# Patient Record
Sex: MALE | Race: OTHER | Employment: OTHER | URBAN - METROPOLITAN AREA
[De-identification: names, ages, dates, MRNs, and addresses within clinical notes are randomized per-mention and may not be internally consistent; named-entity substitution may affect disease eponyms.]

---

## 2017-02-06 RX ORDER — SITAGLIPTIN 100 MG/1
TABLET, FILM COATED ORAL
Qty: 90 TABLET | Refills: 1 | Status: SHIPPED | OUTPATIENT
Start: 2017-02-06 | End: 2017-03-02 | Stop reason: SDUPTHER

## 2017-03-02 ENCOUNTER — OFFICE VISIT (OUTPATIENT)
Dept: INTERNAL MEDICINE | Age: 77
End: 2017-03-02

## 2017-03-02 VITALS
BODY MASS INDEX: 23.3 KG/M2 | HEIGHT: 66 IN | SYSTOLIC BLOOD PRESSURE: 110 MMHG | WEIGHT: 145 LBS | HEART RATE: 109 BPM | DIASTOLIC BLOOD PRESSURE: 72 MMHG | TEMPERATURE: 96.5 F

## 2017-03-02 DIAGNOSIS — Z79.4 UNCONTROLLED TYPE 2 DIABETES MELLITUS WITH MILD NONPROLIFERATIVE RETINOPATHY, WITH LONG-TERM CURRENT USE OF INSULIN, MACULAR EDEMA PRESENCE UNSPECIFIED: Primary | Chronic | ICD-10-CM

## 2017-03-02 DIAGNOSIS — Z23 NEED FOR VACCINATION WITH 13-POLYVALENT PNEUMOCOCCAL CONJUGATE VACCINE: ICD-10-CM

## 2017-03-02 DIAGNOSIS — E11.3299 UNCONTROLLED TYPE 2 DIABETES MELLITUS WITH MILD NONPROLIFERATIVE RETINOPATHY, WITH LONG-TERM CURRENT USE OF INSULIN, MACULAR EDEMA PRESENCE UNSPECIFIED: Primary | Chronic | ICD-10-CM

## 2017-03-02 DIAGNOSIS — E11.65 UNCONTROLLED TYPE 2 DIABETES MELLITUS WITH MILD NONPROLIFERATIVE RETINOPATHY, WITH LONG-TERM CURRENT USE OF INSULIN, MACULAR EDEMA PRESENCE UNSPECIFIED: Primary | Chronic | ICD-10-CM

## 2017-03-02 LAB — HBA1C MFR BLD: 8.9 %

## 2017-03-02 PROCEDURE — 90670 PCV13 VACCINE IM: CPT | Performed by: INTERNAL MEDICINE

## 2017-03-02 PROCEDURE — 99213 OFFICE O/P EST LOW 20 MIN: CPT | Performed by: INTERNAL MEDICINE

## 2017-03-02 PROCEDURE — G0009 ADMIN PNEUMOCOCCAL VACCINE: HCPCS | Performed by: INTERNAL MEDICINE

## 2017-03-02 PROCEDURE — 83036 HEMOGLOBIN GLYCOSYLATED A1C: CPT | Performed by: INTERNAL MEDICINE

## 2017-03-02 ASSESSMENT — PATIENT HEALTH QUESTIONNAIRE - PHQ9
1. LITTLE INTEREST OR PLEASURE IN DOING THINGS: 0
2. FEELING DOWN, DEPRESSED OR HOPELESS: 0
SUM OF ALL RESPONSES TO PHQ QUESTIONS 1-9: 0
SUM OF ALL RESPONSES TO PHQ9 QUESTIONS 1 & 2: 0

## 2017-03-02 ASSESSMENT — ENCOUNTER SYMPTOMS
BLURRED VISION: 1
ABDOMINAL PAIN: 0
CHEST TIGHTNESS: 0
SHORTNESS OF BREATH: 0
TROUBLE SWALLOWING: 0
BLOOD IN STOOL: 0
EYE PAIN: 0

## 2017-05-05 RX ORDER — MEMANTINE HYDROCHLORIDE 10 MG/1
TABLET ORAL
Qty: 180 TABLET | Refills: 1 | Status: SHIPPED | OUTPATIENT
Start: 2017-05-05 | End: 2017-12-02 | Stop reason: SDUPTHER

## 2017-07-06 ENCOUNTER — OFFICE VISIT (OUTPATIENT)
Dept: INTERNAL MEDICINE | Age: 77
End: 2017-07-06

## 2017-07-06 VITALS
TEMPERATURE: 97.5 F | HEART RATE: 100 BPM | OXYGEN SATURATION: 97 % | DIASTOLIC BLOOD PRESSURE: 78 MMHG | HEIGHT: 66 IN | SYSTOLIC BLOOD PRESSURE: 130 MMHG | BODY MASS INDEX: 23.3 KG/M2 | WEIGHT: 145 LBS

## 2017-07-06 DIAGNOSIS — Z91.81 AT HIGH RISK FOR FALLS: ICD-10-CM

## 2017-07-06 DIAGNOSIS — E11.3299 UNCONTROLLED TYPE 2 DIABETES MELLITUS WITH MILD NONPROLIFERATIVE RETINOPATHY, WITH LONG-TERM CURRENT USE OF INSULIN, MACULAR EDEMA PRESENCE UNSPECIFIED: Primary | ICD-10-CM

## 2017-07-06 DIAGNOSIS — E11.42 DIABETIC POLYNEUROPATHY ASSOCIATED WITH TYPE 2 DIABETES MELLITUS (HCC): ICD-10-CM

## 2017-07-06 DIAGNOSIS — E11.65 UNCONTROLLED TYPE 2 DIABETES MELLITUS WITH MILD NONPROLIFERATIVE RETINOPATHY, WITH LONG-TERM CURRENT USE OF INSULIN, MACULAR EDEMA PRESENCE UNSPECIFIED: Primary | ICD-10-CM

## 2017-07-06 DIAGNOSIS — Z79.4 UNCONTROLLED TYPE 2 DIABETES MELLITUS WITH MILD NONPROLIFERATIVE RETINOPATHY, WITH LONG-TERM CURRENT USE OF INSULIN, MACULAR EDEMA PRESENCE UNSPECIFIED: Primary | ICD-10-CM

## 2017-07-06 PROBLEM — H91.90 HEARING LOSS: Status: ACTIVE | Noted: 2017-07-06

## 2017-07-06 PROCEDURE — 99213 OFFICE O/P EST LOW 20 MIN: CPT | Performed by: INTERNAL MEDICINE

## 2017-07-06 RX ORDER — OMEPRAZOLE 20 MG/1
CAPSULE, DELAYED RELEASE ORAL
Qty: 90 CAPSULE | Refills: 0 | Status: SHIPPED | OUTPATIENT
Start: 2017-07-06 | End: 2017-12-02 | Stop reason: SDUPTHER

## 2017-07-06 RX ORDER — UMECLIDINIUM 62.5 UG/1
AEROSOL, POWDER ORAL
Refills: 3 | Status: ON HOLD | COMMUNITY
Start: 2017-06-22 | End: 2017-08-28 | Stop reason: HOSPADM

## 2017-07-06 RX ORDER — FLUTICASONE PROPIONATE 50 MCG
1 SPRAY, SUSPENSION (ML) NASAL DAILY
Qty: 1 BOTTLE | Refills: 3 | Status: SHIPPED | OUTPATIENT
Start: 2017-07-06 | End: 2017-10-12 | Stop reason: SDUPTHER

## 2017-07-06 RX ORDER — BUPROPION HYDROCHLORIDE 75 MG/1
TABLET ORAL
Qty: 180 TABLET | Refills: 1 | Status: SHIPPED | OUTPATIENT
Start: 2017-07-06 | End: 2018-03-01 | Stop reason: SDUPTHER

## 2017-07-06 RX ORDER — ALBUTEROL SULFATE 90 UG/1
2 AEROSOL, METERED RESPIRATORY (INHALATION) EVERY 6 HOURS PRN
Qty: 3 INHALER | Refills: 1 | Status: SHIPPED | OUTPATIENT
Start: 2017-07-06 | End: 2017-09-06 | Stop reason: SDUPTHER

## 2017-07-06 RX ORDER — GLIMEPIRIDE 4 MG/1
TABLET ORAL
Qty: 180 TABLET | Refills: 1 | Status: SHIPPED | OUTPATIENT
Start: 2017-07-06 | End: 2017-12-07

## 2017-07-06 ASSESSMENT — ENCOUNTER SYMPTOMS
RESPIRATORY NEGATIVE: 1
BLURRED VISION: 1
CHOKING: 0
CHEST TIGHTNESS: 0
BLOOD IN STOOL: 0
COUGH: 0
TROUBLE SWALLOWING: 0
GASTROINTESTINAL NEGATIVE: 1
SHORTNESS OF BREATH: 0
ABDOMINAL PAIN: 0
EYE PAIN: 0

## 2017-08-26 ENCOUNTER — HOSPITAL ENCOUNTER (INPATIENT)
Age: 77
LOS: 4 days | Discharge: HOME | DRG: 603 | End: 2017-08-30
Attending: EMERGENCY MEDICINE | Admitting: INTERNAL MEDICINE
Payer: COMMERCIAL

## 2017-08-26 ENCOUNTER — APPOINTMENT (OUTPATIENT)
Dept: ULTRASOUND IMAGING | Age: 77
DRG: 603 | End: 2017-08-26
Payer: COMMERCIAL

## 2017-08-26 DIAGNOSIS — E11.65 TYPE 2 DIABETES MELLITUS WITH HYPERGLYCEMIA, UNSPECIFIED LONG TERM INSULIN USE STATUS: ICD-10-CM

## 2017-08-26 DIAGNOSIS — L03.115 CELLULITIS OF RIGHT LOWER EXTREMITY: Primary | ICD-10-CM

## 2017-08-26 LAB
ALBUMIN SERPL-MCNC: 4.1 G/DL (ref 3.9–4.9)
ALP BLD-CCNC: 54 U/L (ref 35–104)
ALT SERPL-CCNC: 21 U/L (ref 0–41)
ANION GAP SERPL CALCULATED.3IONS-SCNC: 12 MEQ/L (ref 7–13)
AST SERPL-CCNC: 23 U/L (ref 0–40)
BASOPHILS ABSOLUTE: 0.1 K/UL (ref 0–0.2)
BASOPHILS RELATIVE PERCENT: 0.9 %
BILIRUB SERPL-MCNC: 0.2 MG/DL (ref 0–1.2)
BUN BLDV-MCNC: 11 MG/DL (ref 8–23)
CALCIUM SERPL-MCNC: 8.6 MG/DL (ref 8.6–10.2)
CHLORIDE BLD-SCNC: 96 MEQ/L (ref 98–107)
CO2: 28 MEQ/L (ref 22–29)
CREAT SERPL-MCNC: 0.85 MG/DL (ref 0.7–1.2)
EOSINOPHILS ABSOLUTE: 0.1 K/UL (ref 0–0.7)
EOSINOPHILS RELATIVE PERCENT: 1 %
GFR AFRICAN AMERICAN: >60
GFR NON-AFRICAN AMERICAN: >60
GLOBULIN: 2.6 G/DL (ref 2.3–3.5)
GLUCOSE BLD-MCNC: 221 MG/DL (ref 60–115)
GLUCOSE BLD-MCNC: 273 MG/DL (ref 74–109)
HBA1C MFR BLD: 8.8 % (ref 4.8–5.9)
HCT VFR BLD CALC: 36.7 % (ref 42–52)
HEMOGLOBIN: 12.1 G/DL (ref 14–18)
LACTIC ACID: 1.9 MMOL/L (ref 0.5–2.2)
LYMPHOCYTES ABSOLUTE: 1.1 K/UL (ref 1–4.8)
LYMPHOCYTES RELATIVE PERCENT: 15.9 %
MCH RBC QN AUTO: 30 PG (ref 27–31.3)
MCHC RBC AUTO-ENTMCNC: 32.8 % (ref 33–37)
MCV RBC AUTO: 91.4 FL (ref 80–100)
MONOCYTES ABSOLUTE: 0.6 K/UL (ref 0.2–0.8)
MONOCYTES RELATIVE PERCENT: 9.1 %
NEUTROPHILS ABSOLUTE: 5.1 K/UL (ref 1.4–6.5)
NEUTROPHILS RELATIVE PERCENT: 73.1 %
PDW BLD-RTO: 13.6 % (ref 11.5–14.5)
PERFORMED ON: ABNORMAL
PLATELET # BLD: 122 K/UL (ref 130–400)
POTASSIUM SERPL-SCNC: 4.5 MEQ/L (ref 3.5–5.1)
RBC # BLD: 4.02 M/UL (ref 4.7–6.1)
SODIUM BLD-SCNC: 136 MEQ/L (ref 132–144)
TOTAL PROTEIN: 6.7 G/DL (ref 6.4–8.1)
WBC # BLD: 7 K/UL (ref 4.8–10.8)

## 2017-08-26 PROCEDURE — 6370000000 HC RX 637 (ALT 250 FOR IP): Performed by: PHYSICIAN ASSISTANT

## 2017-08-26 PROCEDURE — 36415 COLL VENOUS BLD VENIPUNCTURE: CPT

## 2017-08-26 PROCEDURE — 83036 HEMOGLOBIN GLYCOSYLATED A1C: CPT

## 2017-08-26 PROCEDURE — 87040 BLOOD CULTURE FOR BACTERIA: CPT

## 2017-08-26 PROCEDURE — 96365 THER/PROPH/DIAG IV INF INIT: CPT

## 2017-08-26 PROCEDURE — 99284 EMERGENCY DEPT VISIT MOD MDM: CPT

## 2017-08-26 PROCEDURE — 80053 COMPREHEN METABOLIC PANEL: CPT

## 2017-08-26 PROCEDURE — 1210000000 HC MED SURG R&B

## 2017-08-26 PROCEDURE — 83605 ASSAY OF LACTIC ACID: CPT

## 2017-08-26 PROCEDURE — 6360000002 HC RX W HCPCS: Performed by: PHYSICIAN ASSISTANT

## 2017-08-26 PROCEDURE — 85025 COMPLETE CBC W/AUTO DIFF WBC: CPT

## 2017-08-26 PROCEDURE — 96375 TX/PRO/DX INJ NEW DRUG ADDON: CPT

## 2017-08-26 PROCEDURE — 93971 EXTREMITY STUDY: CPT

## 2017-08-26 PROCEDURE — 2580000003 HC RX 258: Performed by: PHYSICIAN ASSISTANT

## 2017-08-26 PROCEDURE — 96366 THER/PROPH/DIAG IV INF ADDON: CPT

## 2017-08-26 RX ORDER — LINEZOLID 2 MG/ML
600 INJECTION, SOLUTION INTRAVENOUS EVERY 12 HOURS
Status: DISCONTINUED | OUTPATIENT
Start: 2017-08-27 | End: 2017-08-27

## 2017-08-26 RX ORDER — OXYCODONE HYDROCHLORIDE AND ACETAMINOPHEN 5; 325 MG/1; MG/1
1 TABLET ORAL EVERY 4 HOURS PRN
Status: DISCONTINUED | OUTPATIENT
Start: 2017-08-26 | End: 2017-08-30 | Stop reason: HOSPADM

## 2017-08-26 RX ORDER — KETOROLAC TROMETHAMINE 30 MG/ML
15 INJECTION, SOLUTION INTRAMUSCULAR; INTRAVENOUS ONCE
Status: COMPLETED | OUTPATIENT
Start: 2017-08-26 | End: 2017-08-26

## 2017-08-26 RX ORDER — BUPROPION HYDROCHLORIDE 75 MG/1
75 TABLET ORAL 2 TIMES DAILY
Status: DISCONTINUED | OUTPATIENT
Start: 2017-08-26 | End: 2017-08-30 | Stop reason: HOSPADM

## 2017-08-26 RX ORDER — ASPIRIN 81 MG/1
81 TABLET ORAL DAILY
Status: DISCONTINUED | OUTPATIENT
Start: 2017-08-27 | End: 2017-08-27

## 2017-08-26 RX ORDER — CLINDAMYCIN PHOSPHATE 600 MG/50ML
600 INJECTION INTRAVENOUS EVERY 8 HOURS
Status: DISCONTINUED | OUTPATIENT
Start: 2017-08-26 | End: 2017-08-26

## 2017-08-26 RX ORDER — SODIUM CHLORIDE 0.9 % (FLUSH) 0.9 %
10 SYRINGE (ML) INJECTION EVERY 12 HOURS SCHEDULED
Status: DISCONTINUED | OUTPATIENT
Start: 2017-08-26 | End: 2017-08-30 | Stop reason: HOSPADM

## 2017-08-26 RX ORDER — NICOTINE POLACRILEX 4 MG
15 LOZENGE BUCCAL PRN
Status: DISCONTINUED | OUTPATIENT
Start: 2017-08-26 | End: 2017-08-30 | Stop reason: HOSPADM

## 2017-08-26 RX ORDER — KETOROLAC TROMETHAMINE 30 MG/ML
15 INJECTION, SOLUTION INTRAMUSCULAR; INTRAVENOUS EVERY 6 HOURS
Status: DISPENSED | OUTPATIENT
Start: 2017-08-26 | End: 2017-08-28

## 2017-08-26 RX ORDER — ONDANSETRON 2 MG/ML
4 INJECTION INTRAMUSCULAR; INTRAVENOUS EVERY 6 HOURS PRN
Status: DISCONTINUED | OUTPATIENT
Start: 2017-08-26 | End: 2017-08-30 | Stop reason: HOSPADM

## 2017-08-26 RX ORDER — DIPHENHYDRAMINE HCL 25 MG
25 TABLET ORAL ONCE
Status: COMPLETED | OUTPATIENT
Start: 2017-08-26 | End: 2017-08-26

## 2017-08-26 RX ORDER — MORPHINE SULFATE 2 MG/ML
2 INJECTION, SOLUTION INTRAMUSCULAR; INTRAVENOUS EVERY 4 HOURS PRN
Status: DISCONTINUED | OUTPATIENT
Start: 2017-08-26 | End: 2017-08-30 | Stop reason: HOSPADM

## 2017-08-26 RX ORDER — DEXTROSE MONOHYDRATE 50 MG/ML
100 INJECTION, SOLUTION INTRAVENOUS PRN
Status: DISCONTINUED | OUTPATIENT
Start: 2017-08-26 | End: 2017-08-30 | Stop reason: HOSPADM

## 2017-08-26 RX ORDER — CHOLECALCIFEROL (VITAMIN D3) 125 MCG
1000 CAPSULE ORAL DAILY
Status: DISCONTINUED | OUTPATIENT
Start: 2017-08-27 | End: 2017-08-30 | Stop reason: HOSPADM

## 2017-08-26 RX ORDER — ACETAMINOPHEN 325 MG/1
650 TABLET ORAL EVERY 4 HOURS PRN
Status: DISCONTINUED | OUTPATIENT
Start: 2017-08-26 | End: 2017-08-30 | Stop reason: HOSPADM

## 2017-08-26 RX ORDER — SODIUM CHLORIDE 0.9 % (FLUSH) 0.9 %
10 SYRINGE (ML) INJECTION PRN
Status: DISCONTINUED | OUTPATIENT
Start: 2017-08-26 | End: 2017-08-30 | Stop reason: HOSPADM

## 2017-08-26 RX ORDER — FLUTICASONE PROPIONATE 50 MCG
1 SPRAY, SUSPENSION (ML) NASAL DAILY
Status: DISCONTINUED | OUTPATIENT
Start: 2017-08-27 | End: 2017-08-30 | Stop reason: HOSPADM

## 2017-08-26 RX ORDER — INSULIN GLARGINE 100 [IU]/ML
12 INJECTION, SOLUTION SUBCUTANEOUS NIGHTLY
Status: DISCONTINUED | OUTPATIENT
Start: 2017-08-26 | End: 2017-08-30 | Stop reason: HOSPADM

## 2017-08-26 RX ORDER — MEMANTINE HYDROCHLORIDE 10 MG/1
10 TABLET ORAL 2 TIMES DAILY
Status: DISCONTINUED | OUTPATIENT
Start: 2017-08-26 | End: 2017-08-30 | Stop reason: HOSPADM

## 2017-08-26 RX ORDER — DEXTROSE MONOHYDRATE 25 G/50ML
12.5 INJECTION, SOLUTION INTRAVENOUS PRN
Status: DISCONTINUED | OUTPATIENT
Start: 2017-08-26 | End: 2017-08-30 | Stop reason: HOSPADM

## 2017-08-26 RX ADMIN — DIPHENHYDRAMINE HCL 25 MG: 25 TABLET ORAL at 22:19

## 2017-08-26 RX ADMIN — VANCOMYCIN HYDROCHLORIDE 1000 MG: 1 INJECTION, POWDER, LYOPHILIZED, FOR SOLUTION INTRAVENOUS at 20:48

## 2017-08-26 RX ADMIN — KETOROLAC TROMETHAMINE 15 MG: 30 INJECTION, SOLUTION INTRAMUSCULAR at 20:15

## 2017-08-26 ASSESSMENT — ENCOUNTER SYMPTOMS
APNEA: 0
COLOR CHANGE: 1
VOMITING: 0
BACK PAIN: 0
PHOTOPHOBIA: 0
NAUSEA: 0
ABDOMINAL DISTENTION: 0
VOICE CHANGE: 0
EYE DISCHARGE: 0
ANAL BLEEDING: 0

## 2017-08-26 ASSESSMENT — PAIN DESCRIPTION - PAIN TYPE: TYPE: ACUTE PAIN

## 2017-08-26 ASSESSMENT — PAIN SCALES - GENERAL
PAINLEVEL_OUTOF10: 8
PAINLEVEL_OUTOF10: 8
PAINLEVEL_OUTOF10: 4

## 2017-08-26 ASSESSMENT — PAIN DESCRIPTION - DESCRIPTORS: DESCRIPTORS: BURNING

## 2017-08-26 ASSESSMENT — PAIN DESCRIPTION - ORIENTATION: ORIENTATION: RIGHT

## 2017-08-26 ASSESSMENT — PAIN DESCRIPTION - LOCATION: LOCATION: FOOT;TOE (COMMENT WHICH ONE)

## 2017-08-27 LAB
GLUCOSE BLD-MCNC: 103 MG/DL (ref 60–115)
GLUCOSE BLD-MCNC: 213 MG/DL (ref 60–115)
GLUCOSE BLD-MCNC: 268 MG/DL (ref 60–115)
GLUCOSE BLD-MCNC: 96 MG/DL (ref 60–115)
PERFORMED ON: ABNORMAL
PERFORMED ON: ABNORMAL
PERFORMED ON: NORMAL
PERFORMED ON: NORMAL

## 2017-08-27 PROCEDURE — 2580000003 HC RX 258: Performed by: INTERNAL MEDICINE

## 2017-08-27 PROCEDURE — 6370000000 HC RX 637 (ALT 250 FOR IP): Performed by: PHYSICIAN ASSISTANT

## 2017-08-27 PROCEDURE — S0028 INJECTION, FAMOTIDINE, 20 MG: HCPCS | Performed by: PHYSICIAN ASSISTANT

## 2017-08-27 PROCEDURE — 6360000002 HC RX W HCPCS: Performed by: INTERNAL MEDICINE

## 2017-08-27 PROCEDURE — 6370000000 HC RX 637 (ALT 250 FOR IP): Performed by: INTERNAL MEDICINE

## 2017-08-27 PROCEDURE — 2580000003 HC RX 258: Performed by: PHYSICIAN ASSISTANT

## 2017-08-27 PROCEDURE — 94640 AIRWAY INHALATION TREATMENT: CPT

## 2017-08-27 PROCEDURE — 2500000003 HC RX 250 WO HCPCS: Performed by: PHYSICIAN ASSISTANT

## 2017-08-27 PROCEDURE — 1210000000 HC MED SURG R&B

## 2017-08-27 PROCEDURE — 6360000002 HC RX W HCPCS: Performed by: PHYSICIAN ASSISTANT

## 2017-08-27 RX ORDER — GLIMEPIRIDE 4 MG/1
4 TABLET ORAL DAILY
Status: DISCONTINUED | OUTPATIENT
Start: 2017-08-27 | End: 2017-08-30 | Stop reason: HOSPADM

## 2017-08-27 RX ORDER — DIPHENHYDRAMINE HYDROCHLORIDE 50 MG/ML
25 INJECTION INTRAMUSCULAR; INTRAVENOUS EVERY 6 HOURS PRN
Status: DISCONTINUED | OUTPATIENT
Start: 2017-08-27 | End: 2017-08-30 | Stop reason: HOSPADM

## 2017-08-27 RX ADMIN — FAMOTIDINE 20 MG: 10 INJECTION, SOLUTION INTRAVENOUS at 08:50

## 2017-08-27 RX ADMIN — INSULIN GLARGINE 12 UNITS: 100 INJECTION, SOLUTION SUBCUTANEOUS at 21:54

## 2017-08-27 RX ADMIN — INSULIN GLARGINE 12 UNITS: 100 INJECTION, SOLUTION SUBCUTANEOUS at 00:47

## 2017-08-27 RX ADMIN — KETOROLAC TROMETHAMINE 15 MG: 30 INJECTION, SOLUTION INTRAMUSCULAR at 04:35

## 2017-08-27 RX ADMIN — BUPROPION HYDROCHLORIDE 75 MG: 75 TABLET, FILM COATED ORAL at 08:50

## 2017-08-27 RX ADMIN — METFORMIN HYDROCHLORIDE 500 MG: 500 TABLET ORAL at 17:02

## 2017-08-27 RX ADMIN — GLIMEPIRIDE 4 MG: 4 TABLET ORAL at 17:02

## 2017-08-27 RX ADMIN — KETOROLAC TROMETHAMINE 15 MG: 30 INJECTION, SOLUTION INTRAMUSCULAR at 22:04

## 2017-08-27 RX ADMIN — ENOXAPARIN SODIUM 40 MG: 40 INJECTION SUBCUTANEOUS at 08:51

## 2017-08-27 RX ADMIN — MEMANTINE 10 MG: 10 TABLET ORAL at 08:50

## 2017-08-27 RX ADMIN — ASPIRIN 325 MG: 325 TABLET, DELAYED RELEASE ORAL at 13:29

## 2017-08-27 RX ADMIN — PIPERACILLIN SODIUM,TAZOBACTAM SODIUM 3.38 G: 3; .375 INJECTION, POWDER, FOR SOLUTION INTRAVENOUS at 17:02

## 2017-08-27 RX ADMIN — VANCOMYCIN HYDROCHLORIDE 1000 MG: 1 INJECTION, POWDER, LYOPHILIZED, FOR SOLUTION INTRAVENOUS at 21:59

## 2017-08-27 RX ADMIN — TIOTROPIUM BROMIDE 18 MCG: 18 CAPSULE ORAL; RESPIRATORY (INHALATION) at 07:42

## 2017-08-27 RX ADMIN — Medication 10 ML: at 22:08

## 2017-08-27 RX ADMIN — INSULIN LISPRO 6 UNITS: 100 INJECTION, SOLUTION INTRAVENOUS; SUBCUTANEOUS at 11:55

## 2017-08-27 RX ADMIN — CYANOCOBALAMIN TAB 500 MCG 1000 MCG: 500 TAB at 08:50

## 2017-08-27 RX ADMIN — VANCOMYCIN HYDROCHLORIDE 1000 MG: 1 INJECTION, POWDER, LYOPHILIZED, FOR SOLUTION INTRAVENOUS at 13:39

## 2017-08-27 RX ADMIN — PIPERACILLIN SODIUM,TAZOBACTAM SODIUM 3.38 G: 3; .375 INJECTION, POWDER, FOR SOLUTION INTRAVENOUS at 08:50

## 2017-08-27 RX ADMIN — ASPIRIN 325 MG: 325 TABLET, DELAYED RELEASE ORAL at 21:54

## 2017-08-27 RX ADMIN — FLUTICASONE PROPIONATE 1 SPRAY: 50 SPRAY, METERED NASAL at 08:58

## 2017-08-27 RX ADMIN — Medication 10 ML: at 08:50

## 2017-08-27 RX ADMIN — KETOROLAC TROMETHAMINE 15 MG: 30 INJECTION, SOLUTION INTRAMUSCULAR at 17:02

## 2017-08-27 RX ADMIN — PIPERACILLIN SODIUM,TAZOBACTAM SODIUM 3.38 G: 3; .375 INJECTION, POWDER, FOR SOLUTION INTRAVENOUS at 00:48

## 2017-08-27 RX ADMIN — MEMANTINE 10 MG: 10 TABLET ORAL at 00:47

## 2017-08-27 RX ADMIN — LINAGLIPTIN 5 MG: 5 TABLET, FILM COATED ORAL at 08:49

## 2017-08-27 RX ADMIN — BUPROPION HYDROCHLORIDE 75 MG: 75 TABLET, FILM COATED ORAL at 21:54

## 2017-08-27 RX ADMIN — METFORMIN HYDROCHLORIDE 500 MG: 500 TABLET ORAL at 08:50

## 2017-08-27 RX ADMIN — VITAMIN D, TAB 1000IU (100/BT) 1000 UNITS: 25 TAB at 08:50

## 2017-08-27 RX ADMIN — Medication 10 ML: at 00:52

## 2017-08-27 RX ADMIN — MEMANTINE 10 MG: 10 TABLET ORAL at 21:54

## 2017-08-27 RX ADMIN — KETOROLAC TROMETHAMINE 15 MG: 30 INJECTION, SOLUTION INTRAMUSCULAR at 10:18

## 2017-08-27 ASSESSMENT — PAIN SCALES - GENERAL
PAINLEVEL_OUTOF10: 0
PAINLEVEL_OUTOF10: 3
PAINLEVEL_OUTOF10: 0
PAINLEVEL_OUTOF10: 6
PAINLEVEL_OUTOF10: 4

## 2017-08-28 PROBLEM — E11.65 HYPERGLYCEMIA DUE TO TYPE 2 DIABETES MELLITUS (HCC): Chronic | Status: ACTIVE | Noted: 2017-08-28

## 2017-08-28 PROBLEM — L03.115 CELLULITIS OF RIGHT FOOT: Status: ACTIVE | Noted: 2017-08-28

## 2017-08-28 LAB
ALBUMIN SERPL-MCNC: 3.8 G/DL (ref 3.9–4.9)
ALP BLD-CCNC: 45 U/L (ref 35–104)
ALT SERPL-CCNC: 19 U/L (ref 0–41)
ANION GAP SERPL CALCULATED.3IONS-SCNC: 11 MEQ/L (ref 7–13)
AST SERPL-CCNC: 22 U/L (ref 0–40)
BASOPHILS ABSOLUTE: 0 K/UL (ref 0–0.2)
BASOPHILS RELATIVE PERCENT: 0.3 %
BILIRUB SERPL-MCNC: 0.4 MG/DL (ref 0–1.2)
BUN BLDV-MCNC: 8 MG/DL (ref 8–23)
CALCIUM SERPL-MCNC: 8.5 MG/DL (ref 8.6–10.2)
CHLORIDE BLD-SCNC: 102 MEQ/L (ref 98–107)
CO2: 27 MEQ/L (ref 22–29)
CREAT SERPL-MCNC: 0.97 MG/DL (ref 0.7–1.2)
EOSINOPHILS ABSOLUTE: 0.1 K/UL (ref 0–0.7)
EOSINOPHILS RELATIVE PERCENT: 2 %
GFR AFRICAN AMERICAN: >60
GFR NON-AFRICAN AMERICAN: >60
GLOBULIN: 2.4 G/DL (ref 2.3–3.5)
GLUCOSE BLD-MCNC: 194 MG/DL (ref 60–115)
GLUCOSE BLD-MCNC: 224 MG/DL (ref 60–115)
GLUCOSE BLD-MCNC: 233 MG/DL (ref 60–115)
GLUCOSE BLD-MCNC: 86 MG/DL (ref 60–115)
GLUCOSE BLD-MCNC: 87 MG/DL (ref 74–109)
HCT VFR BLD CALC: 37.7 % (ref 42–52)
HEMOGLOBIN: 12.3 G/DL (ref 14–18)
LACTIC ACID: 1.1 MMOL/L (ref 0.5–2.2)
LYMPHOCYTES ABSOLUTE: 0.7 K/UL (ref 1–4.8)
LYMPHOCYTES RELATIVE PERCENT: 11.1 %
MAGNESIUM: 2.1 MG/DL (ref 1.7–2.3)
MCH RBC QN AUTO: 30.3 PG (ref 27–31.3)
MCHC RBC AUTO-ENTMCNC: 32.6 % (ref 33–37)
MCV RBC AUTO: 93 FL (ref 80–100)
MONOCYTES ABSOLUTE: 0.4 K/UL (ref 0.2–0.8)
MONOCYTES RELATIVE PERCENT: 6.8 %
NEUTROPHILS ABSOLUTE: 4.8 K/UL (ref 1.4–6.5)
NEUTROPHILS RELATIVE PERCENT: 79.8 %
PDW BLD-RTO: 13.6 % (ref 11.5–14.5)
PERFORMED ON: ABNORMAL
PERFORMED ON: NORMAL
PLATELET # BLD: 128 K/UL (ref 130–400)
POTASSIUM SERPL-SCNC: 4.9 MEQ/L (ref 3.5–5.1)
RBC # BLD: 4.05 M/UL (ref 4.7–6.1)
SODIUM BLD-SCNC: 140 MEQ/L (ref 132–144)
TOTAL PROTEIN: 6.2 G/DL (ref 6.4–8.1)
WBC # BLD: 6 K/UL (ref 4.8–10.8)

## 2017-08-28 PROCEDURE — 2500000003 HC RX 250 WO HCPCS: Performed by: PHYSICIAN ASSISTANT

## 2017-08-28 PROCEDURE — S0028 INJECTION, FAMOTIDINE, 20 MG: HCPCS | Performed by: PHYSICIAN ASSISTANT

## 2017-08-28 PROCEDURE — 2580000003 HC RX 258: Performed by: PHYSICIAN ASSISTANT

## 2017-08-28 PROCEDURE — 80053 COMPREHEN METABOLIC PANEL: CPT

## 2017-08-28 PROCEDURE — 6360000002 HC RX W HCPCS: Performed by: INTERNAL MEDICINE

## 2017-08-28 PROCEDURE — 2580000003 HC RX 258: Performed by: INTERNAL MEDICINE

## 2017-08-28 PROCEDURE — 6370000000 HC RX 637 (ALT 250 FOR IP): Performed by: INTERNAL MEDICINE

## 2017-08-28 PROCEDURE — 6370000000 HC RX 637 (ALT 250 FOR IP): Performed by: PHYSICIAN ASSISTANT

## 2017-08-28 PROCEDURE — 94640 AIRWAY INHALATION TREATMENT: CPT

## 2017-08-28 PROCEDURE — 83605 ASSAY OF LACTIC ACID: CPT

## 2017-08-28 PROCEDURE — 1210000000 HC MED SURG R&B

## 2017-08-28 PROCEDURE — 99222 1ST HOSP IP/OBS MODERATE 55: CPT | Performed by: INTERNAL MEDICINE

## 2017-08-28 PROCEDURE — 36415 COLL VENOUS BLD VENIPUNCTURE: CPT

## 2017-08-28 PROCEDURE — 83735 ASSAY OF MAGNESIUM: CPT

## 2017-08-28 PROCEDURE — 85025 COMPLETE CBC W/AUTO DIFF WBC: CPT

## 2017-08-28 PROCEDURE — 6360000002 HC RX W HCPCS: Performed by: PHYSICIAN ASSISTANT

## 2017-08-28 RX ORDER — FAMOTIDINE 20 MG/1
20 TABLET, FILM COATED ORAL 2 TIMES DAILY
Status: DISCONTINUED | OUTPATIENT
Start: 2017-08-28 | End: 2017-08-30 | Stop reason: HOSPADM

## 2017-08-28 RX ORDER — ASPIRIN 81 MG/1
81 TABLET ORAL DAILY
Status: DISCONTINUED | OUTPATIENT
Start: 2017-08-29 | End: 2017-08-30 | Stop reason: HOSPADM

## 2017-08-28 RX ORDER — LOSARTAN POTASSIUM 25 MG/1
25 TABLET ORAL DAILY
Status: DISCONTINUED | OUTPATIENT
Start: 2017-08-28 | End: 2017-08-30 | Stop reason: HOSPADM

## 2017-08-28 RX ORDER — PRAVASTATIN SODIUM 10 MG
10 TABLET ORAL NIGHTLY
Qty: 30 TABLET | Refills: 3 | Status: SHIPPED | OUTPATIENT
Start: 2017-08-28 | End: 2018-01-11 | Stop reason: SDUPTHER

## 2017-08-28 RX ORDER — PRAVASTATIN SODIUM 10 MG
10 TABLET ORAL NIGHTLY
Status: DISCONTINUED | OUTPATIENT
Start: 2017-08-28 | End: 2017-08-30 | Stop reason: HOSPADM

## 2017-08-28 RX ADMIN — GLIMEPIRIDE 4 MG: 4 TABLET ORAL at 08:50

## 2017-08-28 RX ADMIN — MEMANTINE 10 MG: 10 TABLET ORAL at 20:55

## 2017-08-28 RX ADMIN — PIPERACILLIN SODIUM,TAZOBACTAM SODIUM 3.38 G: 3; .375 INJECTION, POWDER, FOR SOLUTION INTRAVENOUS at 01:48

## 2017-08-28 RX ADMIN — BUPROPION HYDROCHLORIDE 75 MG: 75 TABLET, FILM COATED ORAL at 20:55

## 2017-08-28 RX ADMIN — PIPERACILLIN SODIUM,TAZOBACTAM SODIUM 3.38 G: 3; .375 INJECTION, POWDER, FOR SOLUTION INTRAVENOUS at 10:07

## 2017-08-28 RX ADMIN — PRAVASTATIN SODIUM 10 MG: 10 TABLET ORAL at 20:55

## 2017-08-28 RX ADMIN — Medication 10 ML: at 20:59

## 2017-08-28 RX ADMIN — FLUTICASONE PROPIONATE 1 SPRAY: 50 SPRAY, METERED NASAL at 08:50

## 2017-08-28 RX ADMIN — FAMOTIDINE 20 MG: 20 TABLET ORAL at 20:55

## 2017-08-28 RX ADMIN — LINAGLIPTIN 5 MG: 5 TABLET, FILM COATED ORAL at 08:49

## 2017-08-28 RX ADMIN — ASPIRIN 325 MG: 325 TABLET, DELAYED RELEASE ORAL at 08:50

## 2017-08-28 RX ADMIN — FAMOTIDINE 20 MG: 20 TABLET ORAL at 15:49

## 2017-08-28 RX ADMIN — Medication 10 ML: at 08:54

## 2017-08-28 RX ADMIN — BUPROPION HYDROCHLORIDE 75 MG: 75 TABLET, FILM COATED ORAL at 08:50

## 2017-08-28 RX ADMIN — TIOTROPIUM BROMIDE 18 MCG: 18 CAPSULE ORAL; RESPIRATORY (INHALATION) at 11:37

## 2017-08-28 RX ADMIN — INSULIN LISPRO 2 UNITS: 100 INJECTION, SOLUTION INTRAVENOUS; SUBCUTANEOUS at 18:08

## 2017-08-28 RX ADMIN — INSULIN GLARGINE 12 UNITS: 100 INJECTION, SOLUTION SUBCUTANEOUS at 20:57

## 2017-08-28 RX ADMIN — KETOROLAC TROMETHAMINE 15 MG: 30 INJECTION, SOLUTION INTRAMUSCULAR at 15:49

## 2017-08-28 RX ADMIN — LOSARTAN POTASSIUM 25 MG: 25 TABLET, FILM COATED ORAL at 15:49

## 2017-08-28 RX ADMIN — VANCOMYCIN HYDROCHLORIDE 1000 MG: 1 INJECTION, POWDER, LYOPHILIZED, FOR SOLUTION INTRAVENOUS at 08:51

## 2017-08-28 RX ADMIN — MEMANTINE 10 MG: 10 TABLET ORAL at 08:50

## 2017-08-28 RX ADMIN — VITAMIN D, TAB 1000IU (100/BT) 1000 UNITS: 25 TAB at 08:50

## 2017-08-28 RX ADMIN — ENOXAPARIN SODIUM 40 MG: 40 INJECTION SUBCUTANEOUS at 08:49

## 2017-08-28 RX ADMIN — FAMOTIDINE 20 MG: 10 INJECTION, SOLUTION INTRAVENOUS at 08:49

## 2017-08-28 RX ADMIN — KETOROLAC TROMETHAMINE 15 MG: 30 INJECTION, SOLUTION INTRAMUSCULAR at 05:33

## 2017-08-28 RX ADMIN — CYANOCOBALAMIN TAB 500 MCG 1000 MCG: 500 TAB at 08:50

## 2017-08-28 RX ADMIN — KETOROLAC TROMETHAMINE 15 MG: 30 INJECTION, SOLUTION INTRAMUSCULAR at 11:53

## 2017-08-28 RX ADMIN — METFORMIN HYDROCHLORIDE 500 MG: 500 TABLET ORAL at 08:50

## 2017-08-28 ASSESSMENT — ENCOUNTER SYMPTOMS
RESPIRATORY NEGATIVE: 1
GASTROINTESTINAL NEGATIVE: 1

## 2017-08-28 ASSESSMENT — PAIN SCALES - GENERAL
PAINLEVEL_OUTOF10: 5
PAINLEVEL_OUTOF10: 4
PAINLEVEL_OUTOF10: 6

## 2017-08-29 LAB
GLUCOSE BLD-MCNC: 160 MG/DL (ref 60–115)
GLUCOSE BLD-MCNC: 244 MG/DL (ref 60–115)
GLUCOSE BLD-MCNC: 308 MG/DL (ref 60–115)
PERFORMED ON: ABNORMAL

## 2017-08-29 PROCEDURE — 1210000000 HC MED SURG R&B

## 2017-08-29 PROCEDURE — 99232 SBSQ HOSP IP/OBS MODERATE 35: CPT | Performed by: INTERNAL MEDICINE

## 2017-08-29 PROCEDURE — 6370000000 HC RX 637 (ALT 250 FOR IP): Performed by: PHYSICIAN ASSISTANT

## 2017-08-29 PROCEDURE — 2580000003 HC RX 258: Performed by: INTERNAL MEDICINE

## 2017-08-29 PROCEDURE — 94640 AIRWAY INHALATION TREATMENT: CPT

## 2017-08-29 PROCEDURE — 6360000002 HC RX W HCPCS: Performed by: INTERNAL MEDICINE

## 2017-08-29 PROCEDURE — 6360000002 HC RX W HCPCS: Performed by: PHYSICIAN ASSISTANT

## 2017-08-29 PROCEDURE — 6370000000 HC RX 637 (ALT 250 FOR IP): Performed by: INTERNAL MEDICINE

## 2017-08-29 PROCEDURE — 2580000003 HC RX 258: Performed by: PHYSICIAN ASSISTANT

## 2017-08-29 RX ORDER — CEFUROXIME AXETIL 500 MG/1
500 TABLET ORAL 2 TIMES DAILY
Qty: 20 TABLET | Refills: 0 | Status: SHIPPED | OUTPATIENT
Start: 2017-08-29 | End: 2017-08-30 | Stop reason: HOSPADM

## 2017-08-29 RX ADMIN — MEMANTINE 10 MG: 10 TABLET ORAL at 09:30

## 2017-08-29 RX ADMIN — GLIMEPIRIDE 4 MG: 4 TABLET ORAL at 09:30

## 2017-08-29 RX ADMIN — FAMOTIDINE 20 MG: 20 TABLET ORAL at 21:08

## 2017-08-29 RX ADMIN — INSULIN LISPRO 2 UNITS: 100 INJECTION, SOLUTION INTRAVENOUS; SUBCUTANEOUS at 08:54

## 2017-08-29 RX ADMIN — DEXTROSE MONOHYDRATE 1 G: 5 INJECTION INTRAVENOUS at 09:31

## 2017-08-29 RX ADMIN — DEXTROSE MONOHYDRATE 1 G: 5 INJECTION INTRAVENOUS at 23:24

## 2017-08-29 RX ADMIN — PRAVASTATIN SODIUM 10 MG: 10 TABLET ORAL at 21:08

## 2017-08-29 RX ADMIN — Medication 10 ML: at 09:38

## 2017-08-29 RX ADMIN — FAMOTIDINE 20 MG: 20 TABLET ORAL at 09:30

## 2017-08-29 RX ADMIN — FLUTICASONE PROPIONATE 1 SPRAY: 50 SPRAY, METERED NASAL at 09:31

## 2017-08-29 RX ADMIN — MEMANTINE 10 MG: 10 TABLET ORAL at 21:08

## 2017-08-29 RX ADMIN — ENOXAPARIN SODIUM 40 MG: 40 INJECTION SUBCUTANEOUS at 09:30

## 2017-08-29 RX ADMIN — LINAGLIPTIN 5 MG: 5 TABLET, FILM COATED ORAL at 09:30

## 2017-08-29 RX ADMIN — CYANOCOBALAMIN TAB 500 MCG 1000 MCG: 500 TAB at 09:30

## 2017-08-29 RX ADMIN — TIOTROPIUM BROMIDE 18 MCG: 18 CAPSULE ORAL; RESPIRATORY (INHALATION) at 08:48

## 2017-08-29 RX ADMIN — INSULIN GLARGINE 12 UNITS: 100 INJECTION, SOLUTION SUBCUTANEOUS at 23:24

## 2017-08-29 RX ADMIN — VITAMIN D, TAB 1000IU (100/BT) 1000 UNITS: 25 TAB at 09:30

## 2017-08-29 RX ADMIN — BUPROPION HYDROCHLORIDE 75 MG: 75 TABLET, FILM COATED ORAL at 09:30

## 2017-08-29 RX ADMIN — INSULIN LISPRO 8 UNITS: 100 INJECTION, SOLUTION INTRAVENOUS; SUBCUTANEOUS at 13:44

## 2017-08-29 RX ADMIN — Medication 10 ML: at 21:09

## 2017-08-29 RX ADMIN — DEXTROSE MONOHYDRATE 1 G: 5 INJECTION INTRAVENOUS at 16:26

## 2017-08-29 RX ADMIN — LOSARTAN POTASSIUM 25 MG: 25 TABLET, FILM COATED ORAL at 09:30

## 2017-08-29 RX ADMIN — BUPROPION HYDROCHLORIDE 75 MG: 75 TABLET, FILM COATED ORAL at 21:08

## 2017-08-29 RX ADMIN — ASPIRIN 81 MG: 81 TABLET, COATED ORAL at 09:30

## 2017-08-30 VITALS
RESPIRATION RATE: 16 BRPM | OXYGEN SATURATION: 100 % | DIASTOLIC BLOOD PRESSURE: 70 MMHG | BODY MASS INDEX: 22.32 KG/M2 | SYSTOLIC BLOOD PRESSURE: 143 MMHG | HEIGHT: 66 IN | TEMPERATURE: 97.9 F | WEIGHT: 138.89 LBS | HEART RATE: 72 BPM

## 2017-08-30 LAB
GLUCOSE BLD-MCNC: 168 MG/DL (ref 60–115)
GLUCOSE BLD-MCNC: 282 MG/DL (ref 60–115)
PERFORMED ON: ABNORMAL
PERFORMED ON: ABNORMAL

## 2017-08-30 PROCEDURE — 6360000002 HC RX W HCPCS: Performed by: INTERNAL MEDICINE

## 2017-08-30 PROCEDURE — 2580000003 HC RX 258: Performed by: INTERNAL MEDICINE

## 2017-08-30 PROCEDURE — 6360000002 HC RX W HCPCS: Performed by: PHYSICIAN ASSISTANT

## 2017-08-30 PROCEDURE — 6370000000 HC RX 637 (ALT 250 FOR IP): Performed by: PHYSICIAN ASSISTANT

## 2017-08-30 PROCEDURE — 94640 AIRWAY INHALATION TREATMENT: CPT

## 2017-08-30 PROCEDURE — 99232 SBSQ HOSP IP/OBS MODERATE 35: CPT | Performed by: INTERNAL MEDICINE

## 2017-08-30 PROCEDURE — 6370000000 HC RX 637 (ALT 250 FOR IP): Performed by: INTERNAL MEDICINE

## 2017-08-30 PROCEDURE — 2580000003 HC RX 258: Performed by: PHYSICIAN ASSISTANT

## 2017-08-30 RX ORDER — CEPHALEXIN 500 MG/1
500 CAPSULE ORAL 3 TIMES DAILY
Qty: 42 CAPSULE | Refills: 0 | Status: SHIPPED | OUTPATIENT
Start: 2017-08-30 | End: 2017-09-13

## 2017-08-30 RX ADMIN — LOSARTAN POTASSIUM 25 MG: 25 TABLET, FILM COATED ORAL at 10:19

## 2017-08-30 RX ADMIN — LINAGLIPTIN 5 MG: 5 TABLET, FILM COATED ORAL at 10:19

## 2017-08-30 RX ADMIN — Medication 10 ML: at 13:16

## 2017-08-30 RX ADMIN — GLIMEPIRIDE 4 MG: 4 TABLET ORAL at 10:19

## 2017-08-30 RX ADMIN — FAMOTIDINE 20 MG: 20 TABLET ORAL at 10:20

## 2017-08-30 RX ADMIN — INSULIN LISPRO 6 UNITS: 100 INJECTION, SOLUTION INTRAVENOUS; SUBCUTANEOUS at 13:13

## 2017-08-30 RX ADMIN — BUPROPION HYDROCHLORIDE 75 MG: 75 TABLET, FILM COATED ORAL at 10:19

## 2017-08-30 RX ADMIN — MEMANTINE 10 MG: 10 TABLET ORAL at 10:20

## 2017-08-30 RX ADMIN — ENOXAPARIN SODIUM 40 MG: 40 INJECTION SUBCUTANEOUS at 10:18

## 2017-08-30 RX ADMIN — INSULIN LISPRO 2 UNITS: 100 INJECTION, SOLUTION INTRAVENOUS; SUBCUTANEOUS at 10:17

## 2017-08-30 RX ADMIN — TIOTROPIUM BROMIDE 18 MCG: 18 CAPSULE ORAL; RESPIRATORY (INHALATION) at 07:35

## 2017-08-30 RX ADMIN — ASPIRIN 81 MG: 81 TABLET, COATED ORAL at 10:19

## 2017-08-30 RX ADMIN — FLUTICASONE PROPIONATE 1 SPRAY: 50 SPRAY, METERED NASAL at 10:22

## 2017-08-30 RX ADMIN — DEXTROSE MONOHYDRATE 1 G: 5 INJECTION INTRAVENOUS at 07:41

## 2017-08-30 RX ADMIN — CYANOCOBALAMIN TAB 500 MCG 1000 MCG: 500 TAB at 10:19

## 2017-08-30 RX ADMIN — VITAMIN D, TAB 1000IU (100/BT) 1000 UNITS: 25 TAB at 10:20

## 2017-08-31 LAB
BLOOD CULTURE, ROUTINE: NORMAL
CULTURE, BLOOD 2: NORMAL

## 2017-09-01 ENCOUNTER — HOSPITAL ENCOUNTER (OUTPATIENT)
Dept: PULMONOLOGY | Age: 77
Discharge: HOME | End: 2017-09-01
Payer: COMMERCIAL

## 2017-09-01 PROCEDURE — 94729 DIFFUSING CAPACITY: CPT

## 2017-09-01 PROCEDURE — 94060 EVALUATION OF WHEEZING: CPT

## 2017-09-01 PROCEDURE — 94726 PLETHYSMOGRAPHY LUNG VOLUMES: CPT

## 2017-09-01 PROCEDURE — 94729 DIFFUSING CAPACITY: CPT | Performed by: INTERNAL MEDICINE

## 2017-09-01 PROCEDURE — 94060 EVALUATION OF WHEEZING: CPT | Performed by: INTERNAL MEDICINE

## 2017-09-01 PROCEDURE — 6360000002 HC RX W HCPCS: Performed by: INTERNAL MEDICINE

## 2017-09-01 RX ORDER — ALBUTEROL SULFATE 2.5 MG/3ML
2.5 SOLUTION RESPIRATORY (INHALATION) ONCE
Status: COMPLETED | OUTPATIENT
Start: 2017-09-01 | End: 2017-09-01

## 2017-09-01 RX ADMIN — ALBUTEROL SULFATE 2.5 MG: 2.5 SOLUTION RESPIRATORY (INHALATION) at 14:56

## 2017-09-06 ENCOUNTER — OFFICE VISIT (OUTPATIENT)
Dept: PULMONOLOGY | Age: 77
End: 2017-09-06

## 2017-09-06 VITALS
SYSTOLIC BLOOD PRESSURE: 110 MMHG | DIASTOLIC BLOOD PRESSURE: 60 MMHG | HEART RATE: 86 BPM | OXYGEN SATURATION: 97 % | WEIGHT: 149 LBS | HEIGHT: 66 IN | TEMPERATURE: 96.9 F | BODY MASS INDEX: 23.95 KG/M2

## 2017-09-06 DIAGNOSIS — Z87.891 SMOKING HISTORY: ICD-10-CM

## 2017-09-06 DIAGNOSIS — J44.9 CHRONIC OBSTRUCTIVE PULMONARY DISEASE, UNSPECIFIED COPD TYPE (HCC): ICD-10-CM

## 2017-09-06 PROCEDURE — 99214 OFFICE O/P EST MOD 30 MIN: CPT | Performed by: INTERNAL MEDICINE

## 2017-09-06 RX ORDER — ALBUTEROL SULFATE 90 UG/1
2 AEROSOL, METERED RESPIRATORY (INHALATION) EVERY 6 HOURS PRN
Qty: 3 INHALER | Refills: 1 | Status: SHIPPED | OUTPATIENT
Start: 2017-09-06 | End: 2018-03-01 | Stop reason: SDUPTHER

## 2017-09-06 ASSESSMENT — ENCOUNTER SYMPTOMS
VOICE CHANGE: 0
VOMITING: 0
EYE ITCHING: 0
SORE THROAT: 0
WHEEZING: 0
CHEST TIGHTNESS: 0
COUGH: 0
ABDOMINAL PAIN: 0
SHORTNESS OF BREATH: 0
NAUSEA: 0
RHINORRHEA: 1
DIARRHEA: 0

## 2017-09-09 LAB
ALBUMIN SERPL-MCNC: 4.2 G/DL (ref 3.9–4.9)
ALP BLD-CCNC: 60 U/L (ref 35–104)
ALT SERPL-CCNC: 19 U/L (ref 0–41)
ANION GAP SERPL CALCULATED.3IONS-SCNC: 16 MEQ/L (ref 7–13)
AST SERPL-CCNC: 23 U/L (ref 0–40)
BILIRUB SERPL-MCNC: 0.3 MG/DL (ref 0–1.2)
BILIRUBIN URINE: NEGATIVE
BLOOD, URINE: NEGATIVE
BUN BLDV-MCNC: 13 MG/DL (ref 8–23)
CALCIUM SERPL-MCNC: 9.6 MG/DL (ref 8.6–10.2)
CHLORIDE BLD-SCNC: 95 MEQ/L (ref 98–107)
CHOLESTEROL, TOTAL: 138 MG/DL (ref 0–199)
CLARITY: CLEAR
CO2: 27 MEQ/L (ref 22–29)
COLOR: YELLOW
CREAT SERPL-MCNC: 0.85 MG/DL (ref 0.7–1.2)
FOLATE: 12.5 NG/ML (ref 7.3–26.1)
GFR AFRICAN AMERICAN: >60
GFR NON-AFRICAN AMERICAN: >60
GLOBULIN: 3.1 G/DL (ref 2.3–3.5)
GLUCOSE BLD-MCNC: 178 MG/DL (ref 74–109)
GLUCOSE URINE: 250 MG/DL
HBA1C MFR BLD: 8.9 % (ref 4.8–5.9)
HDLC SERPL-MCNC: 47 MG/DL (ref 40–59)
KETONES, URINE: NEGATIVE MG/DL
LDL CHOLESTEROL CALCULATED: 75 MG/DL (ref 0–129)
LEUKOCYTE ESTERASE, URINE: NEGATIVE
NITRITE, URINE: NEGATIVE
PH UA: 6.5 (ref 5–9)
POTASSIUM SERPL-SCNC: 5.7 MEQ/L (ref 3.5–5.1)
PROTEIN UA: NEGATIVE MG/DL
SODIUM BLD-SCNC: 138 MEQ/L (ref 132–144)
SPECIFIC GRAVITY UA: 1.02 (ref 1–1.03)
TOTAL PROTEIN: 7.3 G/DL (ref 6.4–8.1)
TRIGL SERPL-MCNC: 80 MG/DL (ref 0–200)
UROBILINOGEN, URINE: 0.2 E.U./DL
VITAMIN B-12: 1299 PG/ML (ref 211–946)

## 2017-10-12 ENCOUNTER — OFFICE VISIT (OUTPATIENT)
Dept: INTERNAL MEDICINE | Age: 77
End: 2017-10-12

## 2017-10-12 VITALS
OXYGEN SATURATION: 99 % | HEIGHT: 66 IN | TEMPERATURE: 97 F | WEIGHT: 145 LBS | SYSTOLIC BLOOD PRESSURE: 130 MMHG | HEART RATE: 97 BPM | BODY MASS INDEX: 23.3 KG/M2 | DIASTOLIC BLOOD PRESSURE: 70 MMHG

## 2017-10-12 DIAGNOSIS — E11.3299 UNCONTROLLED TYPE 2 DIABETES MELLITUS WITH MILD NONPROLIFERATIVE RETINOPATHY, WITH LONG-TERM CURRENT USE OF INSULIN, MACULAR EDEMA PRESENCE UNSPECIFIED, UNSPECIFIED LATERALITY: Primary | Chronic | ICD-10-CM

## 2017-10-12 DIAGNOSIS — E11.65 UNCONTROLLED TYPE 2 DIABETES MELLITUS WITH MILD NONPROLIFERATIVE RETINOPATHY, WITH LONG-TERM CURRENT USE OF INSULIN, MACULAR EDEMA PRESENCE UNSPECIFIED, UNSPECIFIED LATERALITY: Primary | Chronic | ICD-10-CM

## 2017-10-12 DIAGNOSIS — Z79.4 UNCONTROLLED TYPE 2 DIABETES MELLITUS WITH MILD NONPROLIFERATIVE RETINOPATHY, WITH LONG-TERM CURRENT USE OF INSULIN, MACULAR EDEMA PRESENCE UNSPECIFIED, UNSPECIFIED LATERALITY: Primary | Chronic | ICD-10-CM

## 2017-10-12 PROCEDURE — 99213 OFFICE O/P EST LOW 20 MIN: CPT | Performed by: INTERNAL MEDICINE

## 2017-10-12 RX ORDER — FLUTICASONE PROPIONATE 50 MCG
1 SPRAY, SUSPENSION (ML) NASAL DAILY
Qty: 1 BOTTLE | Refills: 3 | Status: SHIPPED | OUTPATIENT
Start: 2017-10-12 | End: 2018-03-01 | Stop reason: SDUPTHER

## 2017-10-12 ASSESSMENT — ENCOUNTER SYMPTOMS
ABDOMINAL PAIN: 0
EYE PAIN: 0
SHORTNESS OF BREATH: 0
CHEST TIGHTNESS: 0
RESPIRATORY NEGATIVE: 1
BLOOD IN STOOL: 0
GASTROINTESTINAL NEGATIVE: 1
BLURRED VISION: 1
TROUBLE SWALLOWING: 0
CHOKING: 0
COUGH: 0

## 2017-10-12 NOTE — PROGRESS NOTES
Protein, UA 09/09/2017 Negative  Negative mg/dL Final    Urobilinogen, Urine 09/09/2017 0.2  <2.0 E.U./dL Final    Nitrite, Urine 09/09/2017 Negative  Negative Final    Leukocyte Esterase, Urine 09/09/2017 Negative  Negative Final    Sodium 09/09/2017 138  132 - 144 mEq/L Final    Potassium 09/09/2017 5.7* 3.5 - 5.1 mEq/L Final    Chloride 09/09/2017 95* 98 - 107 mEq/L Final    CO2 09/09/2017 27  22 - 29 mEq/L Final    Anion Gap 09/09/2017 16* 7 - 13 mEq/L Final    Glucose 09/09/2017 178* 74 - 109 mg/dL Final    BUN 09/09/2017 13  8 - 23 mg/dL Final    CREATININE 09/09/2017 0.85  0.70 - 1.20 mg/dL Final    GFR Non- 09/09/2017 >60.0  >60 Final    Comment: >60 mL/min/1.73m2 EGFR, calc. for ages 25 and older using the  MDRD formula (not corrected for weight), is valid for stable  renal function.  GFR  09/09/2017 >60.0  >60 Final    Comment: >60 mL/min/1.73m2 EGFR, calc. for ages 25 and older using the  MDRD formula (not corrected for weight), is valid for stable  renal function.  Calcium 09/09/2017 9.6  8.6 - 10.2 mg/dL Final    Total Protein 09/09/2017 7.3  6.4 - 8.1 g/dL Final    Alb 09/09/2017 4.2  3.9 - 4.9 g/dL Final    Total Bilirubin 09/09/2017 0.3  0.0 - 1.2 mg/dL Final    Alkaline Phosphatase 09/09/2017 60  35 - 104 U/L Final    ALT 09/09/2017 19  0 - 41 U/L Final    AST 09/09/2017 23  0 - 40 U/L Final    Globulin 09/09/2017 3.1  2.3 - 3.5 g/dL Final    Vitamin B-12 09/09/2017 1299* 211 - 946 pg/mL Final    Folate 09/09/2017 12.5  7.3 - 26.1 ng/mL Final    Comment: As of 8/10/16, the methodology has changed. Results from  this methodology should not be compared with results from  previous methodology.      Admission on 08/26/2017, Discharged on 08/30/2017   Component Date Value Ref Range Status    Sodium 08/26/2017 136  132 - 144 mEq/L Final    Potassium 08/26/2017 4.5  3.5 - 5.1 mEq/L Final    Chloride 08/26/2017 96* 98 - 107 mEq/L Final    1.0 - 4.8 K/uL Final    Monocytes # 08/26/2017 0.6  0.2 - 0.8 K/uL Final    Eosinophils # 08/26/2017 0.1  0.0 - 0.7 K/uL Final    Basophils # 08/26/2017 0.1  0.0 - 0.2 K/uL Final    Lactic Acid 08/26/2017 1.9  0.5 - 2.2 mmol/L Final    Sodium 08/28/2017 140  132 - 144 mEq/L Final    Potassium 08/28/2017 4.9  3.5 - 5.1 mEq/L Final    Chloride 08/28/2017 102  98 - 107 mEq/L Final    CO2 08/28/2017 27  22 - 29 mEq/L Final    Anion Gap 08/28/2017 11  7 - 13 mEq/L Final    Glucose 08/28/2017 87  74 - 109 mg/dL Final    BUN 08/28/2017 8  8 - 23 mg/dL Final    CREATININE 08/28/2017 0.97  0.70 - 1.20 mg/dL Final    GFR Non- 08/28/2017 >60.0  >60 Final    Comment: >60 mL/min/1.73m2 EGFR, calc. for ages 25 and older using the  MDRD formula (not corrected for weight), is valid for stable  renal function.  GFR  08/28/2017 >60.0  >60 Final    Comment: >60 mL/min/1.73m2 EGFR, calc. for ages 25 and older using the  MDRD formula (not corrected for weight), is valid for stable  renal function.       Calcium 08/28/2017 8.5* 8.6 - 10.2 mg/dL Final    Total Protein 08/28/2017 6.2* 6.4 - 8.1 g/dL Final    Alb 08/28/2017 3.8* 3.9 - 4.9 g/dL Final    Total Bilirubin 08/28/2017 0.4  0.0 - 1.2 mg/dL Final    Alkaline Phosphatase 08/28/2017 45  35 - 104 U/L Final    ALT 08/28/2017 19  0 - 41 U/L Final    AST 08/28/2017 22  0 - 40 U/L Final    Globulin 08/28/2017 2.4  2.3 - 3.5 g/dL Final    Lactic Acid 08/28/2017 1.1  0.5 - 2.2 mmol/L Final    Magnesium 08/28/2017 2.1  1.7 - 2.3 mg/dL Final    WBC 08/28/2017 6.0  4.8 - 10.8 K/uL Final    RBC 08/28/2017 4.05* 4.70 - 6.10 M/uL Final    Hemoglobin 08/28/2017 12.3* 14.0 - 18.0 g/dL Final    Hematocrit 08/28/2017 37.7* 42.0 - 52.0 % Final    MCV 08/28/2017 93.0  80.0 - 100.0 fL Final    MCH 08/28/2017 30.3  27.0 - 31.3 pg Final    MCHC 08/28/2017 32.6* 33.0 - 37.0 % Final    RDW 08/28/2017 13.6  11.5 - 14.5 % Final    Platelets Final       HPI:    Diabetes   He presents for his follow-up diabetic visit. He has type 2 diabetes mellitus. His disease course has been fluctuating. Hypoglycemia symptoms include sweats. Pertinent negatives for hypoglycemia include no confusion, dizziness, headaches, nervousness/anxiousness or speech difficulty. Associated symptoms include blurred vision. Pertinent negatives for diabetes include no chest pain, no fatigue, no foot paresthesias, no foot ulcerations, no polydipsia, no polyphagia, no polyuria, no weakness and no weight loss. There are no hypoglycemic complications. Pertinent negatives for hypoglycemia complications include no hospitalization, no nocturnal hypoglycemia and no required assistance. Symptoms are stable. Diabetic complications include retinopathy. Pertinent negatives for diabetic complications include no CVA, nephropathy, peripheral neuropathy or PVD. Risk factors for coronary artery disease include male sex, sedentary lifestyle, diabetes mellitus and tobacco exposure. Current diabetic treatment includes oral agent (triple therapy) and insulin injections. He is compliant with treatment most of the time. His weight is stable. He is following a generally healthy diet. When asked about meal planning, he reported none. He has not had a previous visit with a dietitian. He rarely participates in exercise. His home blood glucose trend is fluctuating minimally. His breakfast blood glucose range is generally 110-130 mg/dl. His overall blood glucose range is >200 mg/dl. An ACE inhibitor/angiotensin II receptor blocker is being taken. He does not see a podiatrist.Eye exam is current. More detail above in the chief complaint(s) and below in the review of systems.      Past Medical History:   Diagnosis Date    Adult celiac disease 2013    COPD (chronic obstructive pulmonary disease) (HCC)     Deformity of toe of left foot     Diverticulosis of colon     Hernia, abdominal     Memory loss or (PRILOSEC) 20 MG delayed release capsule Take 1 tab po daily if needed for heartburn 90 capsule 0    glimepiride (AMARYL) 4 MG tablet TAKE 1 TABLET BY MOUTH EVERY MORNING AND 1 TABLET AT NOON 180 tablet 1    buPROPion (WELLBUTRIN) 75 MG tablet TAKE 1 TABLET BY MOUTH TWICE DAILY 180 tablet 1    memantine (NAMENDA) 10 MG tablet TAKE 1 TABLET BY MOUTH TWICE DAILY 180 tablet 1    Cholecalciferol (VITAMIN D3) 2000 UNITS TABS Take one tablet po 3 times weekly, take with a meal with fat content 45 tablet 1    vitamin B-12 (CYANOCOBALAMIN) 1000 MCG tablet Take 1,000 mcg by mouth daily.  aspirin EC 81 MG EC tablet Take 81 mg by mouth daily. No current facility-administered medications on file prior to visit. Review of Systems   Constitutional: Negative for activity change, appetite change, fatigue, unexpected weight change and weight loss. HENT: Positive for hearing loss (both ears). Negative for ear pain, nosebleeds and trouble swallowing. Eyes: Positive for blurred vision. Negative for pain and visual disturbance. Respiratory: Negative. Negative for cough, choking, chest tightness and shortness of breath. Cardiovascular: Negative. Negative for chest pain, palpitations and leg swelling. Gastrointestinal: Negative. Negative for abdominal pain and blood in stool. Endocrine: Negative for cold intolerance, heat intolerance, polydipsia, polyphagia and polyuria. Genitourinary: Negative for difficulty urinating, flank pain and hematuria. Musculoskeletal: Negative for myalgias, neck pain and neck stiffness. Skin: Negative for rash. Neurological: Negative for dizziness, speech difficulty, weakness, light-headedness and headaches. No unilateral weakness and no falls with injuries. Psychiatric/Behavioral: Positive for decreased concentration (forgetfulness, occasional) and sleep disturbance (sleep loss). Negative for confusion and dysphoric mood.  The patient is not summary\". Orders Placed This Encounter   Medications    insulin glargine (LANTUS SOLOSTAR) 100 UNIT/ML injection pen     Sig: INJECT 12 UNITS INTO THE SKIN NIGHTLY     Dispense:  45 mL     Refill:  3    Insulin Pen Needle (B-D UF III MINI PEN NEEDLES) 31G X 5 MM MISC     Si each by Does not apply route 2 times daily DX : E11.9, IDDM     Dispense:  200 each     Refill:  3    fluticasone (FLONASE) 50 MCG/ACT nasal spray     Si spray by Nasal route daily     Dispense:  1 Bottle     Refill:  3    glucose blood VI test strips (ONE TOUCH ULTRA TEST) strip     Si each by In Vitro route 3 times daily Dx: 250.02,  insulin dependent     Dispense:  300 each     Refill:  3    SITagliptin (JANUVIA) 100 MG tablet     Sig: Take 1 tablet by mouth daily     Dispense:  90 tablet     Refill:  1     Further workup and plan will be determined based on clinical progression and follow up test/ treatment results. Close follow up needed to evaluate treatment results and for care coordination. Return in about 3 months (around 2018). I have reviewed the patient's medical and surgical, family and social history, health maintenance schedule, and updated the computerized patient record.        Electronically signed by Alfredo Perez MD

## 2018-01-11 ENCOUNTER — OFFICE VISIT (OUTPATIENT)
Dept: INTERNAL MEDICINE | Age: 78
End: 2018-01-11

## 2018-01-11 VITALS
BODY MASS INDEX: 22.34 KG/M2 | HEIGHT: 66 IN | HEART RATE: 87 BPM | SYSTOLIC BLOOD PRESSURE: 120 MMHG | DIASTOLIC BLOOD PRESSURE: 74 MMHG | WEIGHT: 139 LBS | OXYGEN SATURATION: 96 %

## 2018-01-11 DIAGNOSIS — E11.65 UNCONTROLLED TYPE 2 DIABETES MELLITUS WITH MILD NONPROLIFERATIVE RETINOPATHY, WITH LONG-TERM CURRENT USE OF INSULIN, MACULAR EDEMA PRESENCE UNSPECIFIED, UNSPECIFIED LATERALITY: Primary | ICD-10-CM

## 2018-01-11 DIAGNOSIS — Z79.4 UNCONTROLLED TYPE 2 DIABETES MELLITUS WITH MILD NONPROLIFERATIVE RETINOPATHY, WITH LONG-TERM CURRENT USE OF INSULIN, MACULAR EDEMA PRESENCE UNSPECIFIED, UNSPECIFIED LATERALITY: Primary | ICD-10-CM

## 2018-01-11 DIAGNOSIS — E11.3299 UNCONTROLLED TYPE 2 DIABETES MELLITUS WITH MILD NONPROLIFERATIVE RETINOPATHY, WITH LONG-TERM CURRENT USE OF INSULIN, MACULAR EDEMA PRESENCE UNSPECIFIED, UNSPECIFIED LATERALITY: Primary | ICD-10-CM

## 2018-01-11 LAB
GLUCOSE BLD-MCNC: 197 MG/DL
HBA1C MFR BLD: 8.9 %

## 2018-01-11 PROCEDURE — G8484 FLU IMMUNIZE NO ADMIN: HCPCS | Performed by: INTERNAL MEDICINE

## 2018-01-11 PROCEDURE — 82962 GLUCOSE BLOOD TEST: CPT | Performed by: INTERNAL MEDICINE

## 2018-01-11 PROCEDURE — G8420 CALC BMI NORM PARAMETERS: HCPCS | Performed by: INTERNAL MEDICINE

## 2018-01-11 PROCEDURE — 1036F TOBACCO NON-USER: CPT | Performed by: INTERNAL MEDICINE

## 2018-01-11 PROCEDURE — 99213 OFFICE O/P EST LOW 20 MIN: CPT | Performed by: INTERNAL MEDICINE

## 2018-01-11 PROCEDURE — 4040F PNEUMOC VAC/ADMIN/RCVD: CPT | Performed by: INTERNAL MEDICINE

## 2018-01-11 PROCEDURE — 1123F ACP DISCUSS/DSCN MKR DOCD: CPT | Performed by: INTERNAL MEDICINE

## 2018-01-11 PROCEDURE — 83036 HEMOGLOBIN GLYCOSYLATED A1C: CPT | Performed by: INTERNAL MEDICINE

## 2018-01-11 PROCEDURE — G8427 DOCREV CUR MEDS BY ELIG CLIN: HCPCS | Performed by: INTERNAL MEDICINE

## 2018-01-11 RX ORDER — PRAVASTATIN SODIUM 10 MG
10 TABLET ORAL NIGHTLY
Qty: 30 TABLET | Refills: 3 | Status: SHIPPED | OUTPATIENT
Start: 2018-01-11 | End: 2018-01-11 | Stop reason: SDUPTHER

## 2018-01-12 RX ORDER — PRAVASTATIN SODIUM 10 MG
TABLET ORAL
Qty: 90 TABLET | Refills: 3 | Status: SHIPPED | OUTPATIENT
Start: 2018-01-12 | End: 2018-06-07 | Stop reason: SDUPTHER

## 2018-02-18 ASSESSMENT — ENCOUNTER SYMPTOMS
CHEST TIGHTNESS: 0
SHORTNESS OF BREATH: 0
ABDOMINAL PAIN: 0
BLURRED VISION: 1
BLOOD IN STOOL: 0
CHOKING: 0
GASTROINTESTINAL NEGATIVE: 1
EYE PAIN: 0
TROUBLE SWALLOWING: 0
RESPIRATORY NEGATIVE: 1
COUGH: 0

## 2018-02-18 NOTE — PROGRESS NOTES
Kelsi Cr is a 68 y.o. male with COPD, remote testicular cancer, memory impairment, who presents with     Chief Complaint   Patient presents with    Diabetes     random office , A1C 8.9, the patient feels well overall but injects insulin eratically due to fear of hypoglycemia, frequently skipping doses    Medication Refill     Since the past office visit, the patient continued to live independently, no ER or hospital admissions, no further decline, keeps in touch with friends, comes to the visit today with friend who interprets from [facility] [place] The following laboratory reports since the past visit were reviewed (the ones pertinent to today's visit were discussed with the patient):    Office Visit on 01/11/2018   Component Date Value Ref Range Status    Glucose 01/11/2018 197  mg/dL Final    Hemoglobin A1C 01/11/2018 8.9  % Final       HPI:    Diabetes   He presents for his follow-up diabetic visit. He has type 2 diabetes mellitus. His disease course has been fluctuating. Hypoglycemia symptoms include sweats. Pertinent negatives for hypoglycemia include no confusion, dizziness, headaches, nervousness/anxiousness or speech difficulty. Associated symptoms include blurred vision. Pertinent negatives for diabetes include no chest pain, no fatigue, no foot paresthesias, no foot ulcerations, no polydipsia, no polyphagia, no polyuria, no weakness and no weight loss. There are no hypoglycemic complications. Pertinent negatives for hypoglycemia complications include no hospitalization, no nocturnal hypoglycemia and no required assistance. Symptoms are stable. Diabetic complications include retinopathy. Pertinent negatives for diabetic complications include no CVA, nephropathy, peripheral neuropathy or PVD. Risk factors for coronary artery disease include male sex, sedentary lifestyle, diabetes mellitus and tobacco exposure. Current diabetic treatment includes oral agent (triple therapy) and insulin injections. He is compliant with treatment most of the time. His weight is stable. He is following a generally healthy diet. When asked about meal planning, he reported none. He has not had a previous visit with a dietitian. He rarely participates in exercise. His home blood glucose trend is fluctuating minimally. His breakfast blood glucose range is generally 110-130 mg/dl. His overall blood glucose range is >200 mg/dl. An ACE inhibitor/angiotensin II receptor blocker is being taken. He does not see a podiatrist.Eye exam is current. More detail above in the chief complaint(s) and below in the review of systems.      Past Medical History:   Diagnosis Date    Adult celiac disease 2013    COPD (chronic obstructive pulmonary disease) (HCC)     Deformity of toe of left foot     Diverticulosis of colon     Hernia, abdominal     Memory loss or impairment     Personal history of testicular cancer     Tubular adenoma of colon 2013    Dr Jose Lazo    Uncontrolled type 2 diabetes mellitus with mild nonproliferative retinopathy ([facility] [address]     Vitamin D deficiency 2014       Past Surgical History:   Procedure Laterality Date    CARPAL TUNNEL RELEASE Right 2013    Dr Emiliano Bowers Left     Dr Sunita Rodríguez Marital status:      Spouse name: N/A    Number of children: 9    Years of education: N/A     Occupational History    construction, retired      Social History Main Topics    Smoking status: Former Smoker     Packs/day: 1.00     Types: Cigarettes     Start date: 9/6/1994     Quit date: 8/12/2017    Smokeless tobacco: Never Used      Comment: tried patches but got rash    Alcohol use Not on file    Drug use: Unknown    Sexual activity: Not on file     Other Topics Concern    Not on file     Social History Narrative    Born in [place], one of 8    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 No pertinent family history. Family and social history were reviewed and pertinent changes documented. ALLERGIES    Aricept [donepezil hydrochloride]; Exelon [rivastigmine tartrate]; and Vancomycin    Current Outpatient Prescriptions on File Prior to Visit   Medication Sig Dispense Refill    metFORMIN (GLUCOPHAGE) 500 MG tablet TAKE 2 TABLETS BY MOUTH TWICE DAILY 360 tablet 1    omeprazole (PRILOSEC) 20 MG delayed release capsule TAKE 1 CAPSULE BY MOUTH DAILY AS NEEDED FOR HEARTBURN 90 capsule 0    glimepiride (AMARYL) 4 MG tablet TAKE 1 TABLET BY MOUTH EVERY MORNING AND 1 TABLET AT NOON 180 tablet 1    memantine (NAMENDA) 10 MG tablet TAKE 1 TABLET BY MOUTH TWICE DAILY 180 tablet 1    FLUZONE HIGH-DOSE 0.5 ML EDUARDO injection ADM 0.5ML IM UTD  0    Insulin Pen Needle (B-D UF III MINI PEN NEEDLES) 31G X 5 MM MISC 2 each by Does not apply route 2 times daily DX : E11.9, IDDM 200 each 3    fluticasone (FLONASE) 50 MCG/ACT nasal spray 1 spray by Nasal route daily 1 Bottle 3    glucose blood VI test strips (ONE TOUCH ULTRA TEST) strip 1 each by In Vitro route 3 times daily Dx: 250.02,  insulin dependent 300 each 3    SITagliptin (JANUVIA) 100 MG tablet Take 1 tablet by mouth daily 90 tablet 1    albuterol sulfate  (90 Base) MCG/ACT inhaler Inhale 2 puffs into the lungs every 6 hours as needed for Wheezing 3 Inhaler 1    tiotropium (SPIRIVA) 18 MCG inhalation capsule Inhale 1 capsule into the lungs daily Dr Jaspal Myers 90 capsule 1    buPROPion (WELLBUTRIN) 75 MG tablet TAKE 1 TABLET BY MOUTH TWICE DAILY 180 tablet 1    Cholecalciferol (VITAMIN D3) 2000 UNITS TABS Take one tablet po 3 times weekly, take with a meal with fat content 45 tablet 1    vitamin B-12 (CYANOCOBALAMIN) 1000 MCG tablet Take 1,000 mcg by mouth daily.  aspirin EC 81 MG EC tablet Take 81 mg by mouth daily. No current facility-administered medications on file prior to visit. Review of Systems   Constitutional: Negative for activity change, appetite change, fatigue, unexpected weight change and weight loss. HENT: Positive for hearing loss (both ears). Negative for ear pain, nosebleeds and trouble swallowing. Eyes: Positive for blurred vision. Negative for pain and visual disturbance. Respiratory: Negative. Negative for cough, choking, chest tightness and shortness of breath. Cardiovascular: Negative. Negative for chest pain, palpitations and leg swelling. Gastrointestinal: Negative. Negative for abdominal pain and blood in stool. Endocrine: Negative for cold intolerance, heat intolerance, polydipsia, polyphagia and polyuria. Genitourinary: Negative for difficulty urinating, flank pain and hematuria. Musculoskeletal: Negative for myalgias, neck pain and neck stiffness. Skin: Negative for rash. Allergic/Immunologic: Positive for food allergies. Neurological: Negative for dizziness, speech difficulty, weakness, light-headedness and headaches. No unilateral weakness and no falls with injuries. Hematological: Does not bruise/bleed easily. Psychiatric/Behavioral: Positive for decreased concentration (forgetfulness, occasional) and sleep disturbance (sleep loss). Negative for confusion and dysphoric mood. The patient is not nervous/anxious. Vitals:    01/11/18 1734   BP: 120/74   Site: Right Arm   Position: Sitting   Cuff Size: Medium Adult   Pulse: 87   SpO2: 96%   Weight: 139 lb (63 kg)   Height: 5' 6\" (1.676 m)       Physical Exam   Constitutional: He is oriented to person, place, and time. He appears well-nourished. No distress. HENT:   Head: Atraumatic. Eyes: EOM are normal. No scleral icterus. Neck: Neck supple. No JVD present. No thyromegaly present. Cardiovascular: Regular rhythm, normal heart sounds and intact distal pulses.    No extrasystoles are

## 2018-03-01 ENCOUNTER — OFFICE VISIT (OUTPATIENT)
Dept: INTERNAL MEDICINE CLINIC | Age: 78
End: 2018-03-01
Payer: COMMERCIAL

## 2018-03-01 VITALS
DIASTOLIC BLOOD PRESSURE: 79 MMHG | HEIGHT: 66 IN | OXYGEN SATURATION: 97 % | WEIGHT: 142 LBS | SYSTOLIC BLOOD PRESSURE: 164 MMHG | BODY MASS INDEX: 22.82 KG/M2 | HEART RATE: 86 BPM | TEMPERATURE: 97.6 F

## 2018-03-01 DIAGNOSIS — E11.3299 UNCONTROLLED TYPE 2 DIABETES MELLITUS WITH MILD NONPROLIFERATIVE RETINOPATHY, WITH LONG-TERM CURRENT USE OF INSULIN, MACULAR EDEMA PRESENCE UNSPECIFIED, UNSPECIFIED LATERALITY: Primary | Chronic | ICD-10-CM

## 2018-03-01 DIAGNOSIS — E11.65 UNCONTROLLED TYPE 2 DIABETES MELLITUS WITH MILD NONPROLIFERATIVE RETINOPATHY, WITH LONG-TERM CURRENT USE OF INSULIN, MACULAR EDEMA PRESENCE UNSPECIFIED, UNSPECIFIED LATERALITY: Primary | Chronic | ICD-10-CM

## 2018-03-01 DIAGNOSIS — I10 ESSENTIAL HYPERTENSION: Chronic | ICD-10-CM

## 2018-03-01 DIAGNOSIS — Z79.4 UNCONTROLLED TYPE 2 DIABETES MELLITUS WITH MILD NONPROLIFERATIVE RETINOPATHY, WITH LONG-TERM CURRENT USE OF INSULIN, MACULAR EDEMA PRESENCE UNSPECIFIED, UNSPECIFIED LATERALITY: Primary | Chronic | ICD-10-CM

## 2018-03-01 PROCEDURE — G8482 FLU IMMUNIZE ORDER/ADMIN: HCPCS | Performed by: INTERNAL MEDICINE

## 2018-03-01 PROCEDURE — 1123F ACP DISCUSS/DSCN MKR DOCD: CPT | Performed by: INTERNAL MEDICINE

## 2018-03-01 PROCEDURE — 99213 OFFICE O/P EST LOW 20 MIN: CPT | Performed by: INTERNAL MEDICINE

## 2018-03-01 PROCEDURE — G8420 CALC BMI NORM PARAMETERS: HCPCS | Performed by: INTERNAL MEDICINE

## 2018-03-01 PROCEDURE — 4040F PNEUMOC VAC/ADMIN/RCVD: CPT | Performed by: INTERNAL MEDICINE

## 2018-03-01 PROCEDURE — 1036F TOBACCO NON-USER: CPT | Performed by: INTERNAL MEDICINE

## 2018-03-01 PROCEDURE — G8427 DOCREV CUR MEDS BY ELIG CLIN: HCPCS | Performed by: INTERNAL MEDICINE

## 2018-03-01 RX ORDER — BUPROPION HYDROCHLORIDE 75 MG/1
TABLET ORAL
Qty: 180 TABLET | Refills: 1 | Status: SHIPPED | OUTPATIENT
Start: 2018-03-01 | End: 2018-06-07 | Stop reason: SDUPTHER

## 2018-03-01 RX ORDER — ALBUTEROL SULFATE 90 UG/1
2 AEROSOL, METERED RESPIRATORY (INHALATION) EVERY 6 HOURS PRN
Qty: 3 INHALER | Refills: 1 | Status: SHIPPED | OUTPATIENT
Start: 2018-03-01 | End: 2018-06-07 | Stop reason: SDUPTHER

## 2018-03-01 RX ORDER — FLUTICASONE PROPIONATE 50 MCG
1 SPRAY, SUSPENSION (ML) NASAL DAILY
Qty: 1 BOTTLE | Refills: 3 | Status: SHIPPED | OUTPATIENT
Start: 2018-03-01 | End: 2018-06-07 | Stop reason: SDUPTHER

## 2018-03-01 RX ORDER — LISINOPRIL 2.5 MG/1
2.5 TABLET ORAL DAILY
Qty: 90 TABLET | Refills: 1 | Status: SHIPPED | OUTPATIENT
Start: 2018-03-01 | End: 2018-06-07 | Stop reason: SDUPTHER

## 2018-03-12 ENCOUNTER — TELEPHONE (OUTPATIENT)
Dept: ENDOCRINOLOGY | Age: 78
End: 2018-03-12

## 2018-03-18 PROBLEM — I10 ESSENTIAL HYPERTENSION: Chronic | Status: ACTIVE | Noted: 2018-03-18

## 2018-03-18 ASSESSMENT — ENCOUNTER SYMPTOMS
CHEST TIGHTNESS: 0
TROUBLE SWALLOWING: 0
CHOKING: 0
GASTROINTESTINAL NEGATIVE: 1
BLURRED VISION: 1
SHORTNESS OF BREATH: 0
RESPIRATORY NEGATIVE: 1

## 2018-03-18 NOTE — PROGRESS NOTES
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 No pertinent family history. Family and social history were reviewed and pertinent changes documented. ALLERGIES    Aricept [donepezil hydrochloride]; Exelon [rivastigmine tartrate]; and Vancomycin    Current Outpatient Prescriptions on File Prior to Visit   Medication Sig Dispense Refill    pravastatin (PRAVACHOL) 10 MG tablet TAKE 1 TABLET BY MOUTH EVERY NIGHT 90 tablet 3    insulin glargine (LANTUS SOLOSTAR) 100 UNIT/ML injection pen INJECT 10 UNITS subcut daily at 7 PM 45 mL 3    metFORMIN (GLUCOPHAGE) 500 MG tablet TAKE 2 TABLETS BY MOUTH TWICE DAILY 360 tablet 1    omeprazole (PRILOSEC) 20 MG delayed release capsule TAKE 1 CAPSULE BY MOUTH DAILY AS NEEDED FOR HEARTBURN 90 capsule 0    glimepiride (AMARYL) 4 MG tablet TAKE 1 TABLET BY MOUTH EVERY MORNING AND 1 TABLET AT NOON 180 tablet 1    memantine (NAMENDA) 10 MG tablet TAKE 1 TABLET BY MOUTH TWICE DAILY 180 tablet 1    Insulin Pen Needle (B-D UF III MINI PEN NEEDLES) 31G X 5 MM MISC 2 each by Does not apply route 2 times daily DX : E11.9, IDDM 200 each 3    Cholecalciferol (VITAMIN D3) 2000 UNITS TABS Take one tablet po 3 times weekly, take with a meal with fat content 45 tablet 1    vitamin B-12 (CYANOCOBALAMIN) 1000 MCG tablet Take 1,000 mcg by mouth daily.  aspirin EC 81 MG EC tablet Take 81 mg by mouth daily. No current facility-administered medications on file prior to visit. Review of Systems   Constitutional: Negative for fatigue, unexpected weight change and weight loss. HENT: Negative for nosebleeds and trouble swallowing. Eyes: Positive for blurred vision. Respiratory: Negative.   Negative for choking, chest tightness and shortness of breath. Cardiovascular: Negative. Negative for chest pain and palpitations. Gastrointestinal: Negative. Endocrine: Negative. Negative for cold intolerance, heat intolerance, polydipsia and polyuria. Genitourinary: Negative for difficulty urinating and hematuria. Musculoskeletal: Negative for arthralgias and myalgias. Neurological: Negative for dizziness, speech difficulty, weakness and headaches. Psychiatric/Behavioral: Positive for decreased concentration. Negative for confusion, dysphoric mood and sleep disturbance. The patient is nervous/anxious. Vitals:    03/01/18 1754 03/01/18 1801   BP: (!) 149/77 (!) 164/79   Site: Left Arm Left Arm   Position: Sitting Sitting   Cuff Size: Medium Adult Medium Adult   Pulse: 86    Temp: 97.6 °F (36.4 °C)    TempSrc: Tympanic    SpO2: 97%    Weight: 142 lb (64.4 kg)    Height: 5' 6\" (1.676 m)        Physical Exam   Constitutional: He appears well-nourished. No distress. HENT:   Head: Atraumatic. Eyes: EOM are normal. No scleral icterus. Neck: Neck supple. No JVD present. No thyromegaly present. Cardiovascular: Regular rhythm, normal heart sounds and intact distal pulses. No extrasystoles are present. Pulses:       Carotid pulses are 2+ on the right side, and 2+ on the left side. Radial pulses are 2+ on the right side, and 2+ on the left side. Pulmonary/Chest: Effort normal and breath sounds normal. He has no rales. Abdominal: Soft. He exhibits no distension. There is no tenderness. Musculoskeletal: Normal range of motion. Lymphadenopathy:     He has no cervical adenopathy. Neurological: He is alert. Coordination normal.   Stance, gait well balanced and stable, Romberg negative. No focal motor neurological deficits. Skin: Skin is warm and dry. Psychiatric: His speech is normal. Judgment normal. His mood appears anxious. His affect is labile. He is not slowed and not withdrawn.  Thought content is not results and for care coordination. Return in about 3 months (around 6/1/2018). I have reviewed the patient's medical and surgical, family and social history, health maintenance schedule, and updated the computerized patient record. Please note this report has been partially produced by using speech recognition hardware. It may contain errors related to the system, including grammar, punctuation and spelling as well as words and phrases that may seem inaccurate. For any questions or concerns, please feel free to contact me for clarification.         Electronically signed by Yulia oPtts MD

## 2018-03-25 ENCOUNTER — HOSPITAL ENCOUNTER (EMERGENCY)
Age: 78
Discharge: HOME | End: 2018-03-25
Attending: EMERGENCY MEDICINE
Payer: COMMERCIAL

## 2018-03-25 VITALS
SYSTOLIC BLOOD PRESSURE: 134 MMHG | RESPIRATION RATE: 16 BRPM | TEMPERATURE: 97.4 F | WEIGHT: 145 LBS | HEIGHT: 66 IN | OXYGEN SATURATION: 97 % | BODY MASS INDEX: 23.3 KG/M2 | DIASTOLIC BLOOD PRESSURE: 63 MMHG | HEART RATE: 79 BPM

## 2018-03-25 DIAGNOSIS — R11.2 NON-INTRACTABLE VOMITING WITH NAUSEA, UNSPECIFIED VOMITING TYPE: Primary | ICD-10-CM

## 2018-03-25 LAB
ALBUMIN SERPL-MCNC: 4.9 G/DL (ref 3.9–4.9)
ALP BLD-CCNC: 62 U/L (ref 35–104)
ALT SERPL-CCNC: 18 U/L (ref 0–41)
ANION GAP SERPL CALCULATED.3IONS-SCNC: 13 MEQ/L (ref 7–13)
AST SERPL-CCNC: 23 U/L (ref 0–40)
BASOPHILS ABSOLUTE: 0 K/UL (ref 0–0.2)
BASOPHILS RELATIVE PERCENT: 0.3 %
BILIRUB SERPL-MCNC: 0.4 MG/DL (ref 0–1.2)
BILIRUBIN URINE: NEGATIVE
BLOOD, URINE: NEGATIVE
BUN BLDV-MCNC: 15 MG/DL (ref 8–23)
CALCIUM SERPL-MCNC: 9.6 MG/DL (ref 8.6–10.2)
CHLORIDE BLD-SCNC: 97 MEQ/L (ref 98–107)
CLARITY: CLEAR
CO2: 29 MEQ/L (ref 22–29)
COLOR: YELLOW
CREAT SERPL-MCNC: 0.89 MG/DL (ref 0.7–1.2)
EKG ATRIAL RATE: 86 BPM
EKG P AXIS: 74 DEGREES
EKG P-R INTERVAL: 172 MS
EKG Q-T INTERVAL: 376 MS
EKG QRS DURATION: 84 MS
EKG QTC CALCULATION (BAZETT): 449 MS
EKG R AXIS: 64 DEGREES
EKG T AXIS: 63 DEGREES
EKG VENTRICULAR RATE: 86 BPM
EOSINOPHILS ABSOLUTE: 0.1 K/UL (ref 0–0.7)
EOSINOPHILS RELATIVE PERCENT: 0.6 %
GFR AFRICAN AMERICAN: >60
GFR NON-AFRICAN AMERICAN: >60
GLOBULIN: 2.6 G/DL (ref 2.3–3.5)
GLUCOSE BLD-MCNC: 215 MG/DL (ref 74–109)
GLUCOSE URINE: 250 MG/DL
HCT VFR BLD CALC: 40.1 % (ref 42–52)
HEMOGLOBIN: 13.6 G/DL (ref 14–18)
KETONES, URINE: 15 MG/DL
LEUKOCYTE ESTERASE, URINE: NEGATIVE
LIPASE: 22 U/L (ref 13–60)
LYMPHOCYTES ABSOLUTE: 0.9 K/UL (ref 1–4.8)
LYMPHOCYTES RELATIVE PERCENT: 10.7 %
MCH RBC QN AUTO: 31.3 PG (ref 27–31.3)
MCHC RBC AUTO-ENTMCNC: 34 % (ref 33–37)
MCV RBC AUTO: 92 FL (ref 80–100)
MONOCYTES ABSOLUTE: 0.4 K/UL (ref 0.2–0.8)
MONOCYTES RELATIVE PERCENT: 4.8 %
NEUTROPHILS ABSOLUTE: 6.7 K/UL (ref 1.4–6.5)
NEUTROPHILS RELATIVE PERCENT: 83.6 %
NITRITE, URINE: NEGATIVE
PDW BLD-RTO: 13.2 % (ref 11.5–14.5)
PH UA: 6 (ref 5–9)
PLATELET # BLD: 140 K/UL (ref 130–400)
POTASSIUM SERPL-SCNC: 4.5 MEQ/L (ref 3.5–5.1)
PROTEIN UA: NEGATIVE MG/DL
RBC # BLD: 4.35 M/UL (ref 4.7–6.1)
SODIUM BLD-SCNC: 139 MEQ/L (ref 132–144)
SPECIFIC GRAVITY UA: 1.02 (ref 1–1.03)
TOTAL PROTEIN: 7.5 G/DL (ref 6.4–8.1)
TROPONIN: <0.01 NG/ML (ref 0–0.01)
URINE REFLEX TO CULTURE: ABNORMAL
UROBILINOGEN, URINE: 0.2 E.U./DL
WBC # BLD: 8 K/UL (ref 4.8–10.8)

## 2018-03-25 PROCEDURE — 81003 URINALYSIS AUTO W/O SCOPE: CPT

## 2018-03-25 PROCEDURE — 96374 THER/PROPH/DIAG INJ IV PUSH: CPT

## 2018-03-25 PROCEDURE — 99284 EMERGENCY DEPT VISIT MOD MDM: CPT

## 2018-03-25 PROCEDURE — 93005 ELECTROCARDIOGRAM TRACING: CPT

## 2018-03-25 PROCEDURE — 80053 COMPREHEN METABOLIC PANEL: CPT

## 2018-03-25 PROCEDURE — 85025 COMPLETE CBC W/AUTO DIFF WBC: CPT

## 2018-03-25 PROCEDURE — 6360000002 HC RX W HCPCS: Performed by: EMERGENCY MEDICINE

## 2018-03-25 PROCEDURE — 84484 ASSAY OF TROPONIN QUANT: CPT

## 2018-03-25 PROCEDURE — 2580000003 HC RX 258: Performed by: EMERGENCY MEDICINE

## 2018-03-25 PROCEDURE — 36415 COLL VENOUS BLD VENIPUNCTURE: CPT

## 2018-03-25 PROCEDURE — 83690 ASSAY OF LIPASE: CPT

## 2018-03-25 RX ORDER — SODIUM CHLORIDE 9 MG/ML
INJECTION, SOLUTION INTRAVENOUS CONTINUOUS
Status: DISCONTINUED | OUTPATIENT
Start: 2018-03-25 | End: 2018-03-25 | Stop reason: HOSPADM

## 2018-03-25 RX ORDER — ONDANSETRON 2 MG/ML
4 INJECTION INTRAMUSCULAR; INTRAVENOUS ONCE
Status: COMPLETED | OUTPATIENT
Start: 2018-03-25 | End: 2018-03-25

## 2018-03-25 RX ORDER — ONDANSETRON 4 MG/1
4 TABLET, ORALLY DISINTEGRATING ORAL EVERY 8 HOURS PRN
Qty: 14 TABLET | Refills: 0 | Status: SHIPPED | OUTPATIENT
Start: 2018-03-25 | End: 2019-07-18

## 2018-03-25 RX ORDER — ASCORBIC ACID 500 MG
1000 TABLET ORAL DAILY
COMMUNITY

## 2018-03-25 RX ADMIN — ONDANSETRON 4 MG: 2 INJECTION INTRAMUSCULAR; INTRAVENOUS at 15:54

## 2018-03-25 RX ADMIN — SODIUM CHLORIDE: 9 INJECTION, SOLUTION INTRAVENOUS at 15:54

## 2018-03-25 ASSESSMENT — ENCOUNTER SYMPTOMS
NAUSEA: 1
SHORTNESS OF BREATH: 0
SORE THROAT: 0
VOMITING: 1
CHEST TIGHTNESS: 0
ABDOMINAL PAIN: 0
DIARRHEA: 0
EYE PAIN: 0

## 2018-03-25 NOTE — ED NOTES
Blood work sent to lab. Pt given urinal for urine sample needed.       Sharla Reina, RN  03/25/18 5713

## 2018-03-25 NOTE — ED NOTES
Bed: 05  Expected date: 3/25/18  Expected time:   Means of arrival:   Comments:  68 male vomiting.  [id number] [id number]  03/25/18 3498

## 2018-03-25 NOTE — ED PROVIDER NOTES
[address] [facility] ED  eMERGENCY dEPARTMENT eNCOUnter      Pt Name: Lazarus Bo  MRN: [id number]  Anigfmarbella [birth year]  Date of evaluation: 3/25/2018  Provider: Taylor Cabral DO    CHIEF COMPLAINT     No chief complaint on file. HISTORY OF PRESENT ILLNESS   (Location/Symptom, Timing/Onset, Context/Setting, Quality, Duration, Modifying Factors, Severity)  Note limiting factors. Lazarus Bo is a 68 y.o. male who presents to the emergency department . Patient presents with nausea and vomiting today. He vomited 4 times. He is denying any abdominal pain and does not have diarrhea. Generally he does not feel poorly. Last vomited approximately 15 minutes ago. The only thing he has eaten today is caking coffee. HPI    Nursing Notes were reviewed. REVIEW OF SYSTEMS    (2-9 systems for level 4, 10 or more for level 5)     Review of Systems   Constitutional: Negative for activity change, appetite change, fatigue and fever. HENT: Negative for congestion and sore throat. Eyes: Negative for pain and visual disturbance. Respiratory: Negative for chest tightness and shortness of breath. Cardiovascular: Negative for chest pain. Gastrointestinal: Positive for nausea and vomiting. Negative for abdominal pain and diarrhea. Endocrine: Negative for polydipsia. Genitourinary: Negative for flank pain and urgency. Musculoskeletal: Negative for gait problem and neck stiffness. Skin: Negative for rash. Neurological: Negative for weakness, light-headedness and headaches. Psychiatric/Behavioral: Negative for confusion and sleep disturbance. Except as noted above the remainder of the review of systems was reviewed and negative.        PAST MEDICAL HISTORY     Past Medical History:   Diagnosis Date    Adult celiac disease 2013    COPD (chronic obstructive pulmonary disease) (HCC)     Deformity of toe of left foot     Diverticulosis of colon     Hernia, abdominal     Memory loss or threatening deterioration in the patient's condition which required my urgent intervention. CONSULTS:  None    PROCEDURES:  Unless otherwise noted below, none     Procedures    FINAL IMPRESSION      1.  Non-intractable vomiting with nausea, unspecified vomiting type          DISPOSITION/PLAN   DISPOSITION  home      PATIENT REFERRED TO:  Noah Rizo MD  [address] [address] [address]  [phone]            DISCHARGE MEDICATIONS:  New Prescriptions    ONDANSETRON (ZOFRAN ODT) 4 MG DISINTEGRATING TABLET    Take 1 tablet by mouth every 8 hours as needed for Nausea          (Please note that portions of this note were completed with a voice recognition program.  Efforts were made to edit the dictations but occasionally words are mis-transcribed.)    Gabriela Liam, DO (electronically signed)  Attending Emergency Physician          Gabriela Liam, DO  03/25/18 [address] DO  04/26/18 1045

## 2018-03-26 PROCEDURE — 93010 ELECTROCARDIOGRAM REPORT: CPT | Performed by: INTERNAL MEDICINE

## 2018-03-26 RX ORDER — GLIMEPIRIDE 4 MG/1
TABLET ORAL
Qty: 180 TABLET | Refills: 0 | Status: SHIPPED | OUTPATIENT
Start: 2018-03-26 | End: 2018-06-07 | Stop reason: SDUPTHER

## 2018-04-20 RX ORDER — OMEPRAZOLE 20 MG/1
CAPSULE, DELAYED RELEASE ORAL
Qty: 90 CAPSULE | Refills: 0 | Status: SHIPPED | OUTPATIENT
Start: 2018-04-20 | End: 2018-06-02 | Stop reason: SDUPTHER

## 2018-06-04 RX ORDER — OMEPRAZOLE 20 MG/1
CAPSULE, DELAYED RELEASE ORAL
Qty: 90 CAPSULE | Refills: 1 | Status: SHIPPED | OUTPATIENT
Start: 2018-06-04 | End: 2019-07-18 | Stop reason: ALTCHOICE

## 2018-06-07 ENCOUNTER — OFFICE VISIT (OUTPATIENT)
Dept: INTERNAL MEDICINE CLINIC | Age: 78
End: 2018-06-07
Payer: COMMERCIAL

## 2018-06-07 VITALS
OXYGEN SATURATION: 98 % | DIASTOLIC BLOOD PRESSURE: 70 MMHG | HEIGHT: 66 IN | TEMPERATURE: 97.6 F | HEART RATE: 107 BPM | WEIGHT: 139 LBS | SYSTOLIC BLOOD PRESSURE: 110 MMHG | BODY MASS INDEX: 22.34 KG/M2

## 2018-06-07 DIAGNOSIS — E11.65 UNCONTROLLED TYPE 2 DIABETES MELLITUS WITH MILD NONPROLIFERATIVE RETINOPATHY, WITH LONG-TERM CURRENT USE OF INSULIN, MACULAR EDEMA PRESENCE UNSPECIFIED, UNSPECIFIED LATERALITY: Primary | Chronic | ICD-10-CM

## 2018-06-07 DIAGNOSIS — Z79.4 UNCONTROLLED TYPE 2 DIABETES MELLITUS WITH MILD NONPROLIFERATIVE RETINOPATHY, WITH LONG-TERM CURRENT USE OF INSULIN, MACULAR EDEMA PRESENCE UNSPECIFIED, UNSPECIFIED LATERALITY: Primary | Chronic | ICD-10-CM

## 2018-06-07 DIAGNOSIS — J44.9 CHRONIC OBSTRUCTIVE PULMONARY DISEASE, UNSPECIFIED COPD TYPE (HCC): ICD-10-CM

## 2018-06-07 DIAGNOSIS — E11.3299 UNCONTROLLED TYPE 2 DIABETES MELLITUS WITH MILD NONPROLIFERATIVE RETINOPATHY, WITH LONG-TERM CURRENT USE OF INSULIN, MACULAR EDEMA PRESENCE UNSPECIFIED, UNSPECIFIED LATERALITY: Primary | Chronic | ICD-10-CM

## 2018-06-07 LAB — HBA1C MFR BLD: 8.6 %

## 2018-06-07 PROCEDURE — 99213 OFFICE O/P EST LOW 20 MIN: CPT | Performed by: INTERNAL MEDICINE

## 2018-06-07 PROCEDURE — G8420 CALC BMI NORM PARAMETERS: HCPCS | Performed by: INTERNAL MEDICINE

## 2018-06-07 PROCEDURE — 83036 HEMOGLOBIN GLYCOSYLATED A1C: CPT | Performed by: INTERNAL MEDICINE

## 2018-06-07 PROCEDURE — G8926 SPIRO NO PERF OR DOC: HCPCS | Performed by: INTERNAL MEDICINE

## 2018-06-07 PROCEDURE — 3023F SPIROM DOC REV: CPT | Performed by: INTERNAL MEDICINE

## 2018-06-07 PROCEDURE — G8427 DOCREV CUR MEDS BY ELIG CLIN: HCPCS | Performed by: INTERNAL MEDICINE

## 2018-06-07 PROCEDURE — 1123F ACP DISCUSS/DSCN MKR DOCD: CPT | Performed by: INTERNAL MEDICINE

## 2018-06-07 PROCEDURE — 4040F PNEUMOC VAC/ADMIN/RCVD: CPT | Performed by: INTERNAL MEDICINE

## 2018-06-07 PROCEDURE — 4004F PT TOBACCO SCREEN RCVD TLK: CPT | Performed by: INTERNAL MEDICINE

## 2018-06-07 RX ORDER — ALBUTEROL SULFATE 90 UG/1
2 AEROSOL, METERED RESPIRATORY (INHALATION) EVERY 6 HOURS PRN
Qty: 3 INHALER | Refills: 1 | Status: SHIPPED | OUTPATIENT
Start: 2018-06-07 | End: 2019-07-18 | Stop reason: SDUPTHER

## 2018-06-07 RX ORDER — BUPROPION HYDROCHLORIDE 75 MG/1
TABLET ORAL
Qty: 180 TABLET | Refills: 1 | Status: SHIPPED | OUTPATIENT
Start: 2018-06-07 | End: 2019-07-18

## 2018-06-07 RX ORDER — MEMANTINE HYDROCHLORIDE 10 MG/1
TABLET ORAL
Qty: 180 TABLET | Refills: 1 | Status: SHIPPED | OUTPATIENT
Start: 2018-06-07 | End: 2019-07-18

## 2018-06-07 RX ORDER — FLUTICASONE PROPIONATE 50 MCG
1 SPRAY, SUSPENSION (ML) NASAL DAILY
Qty: 1 BOTTLE | Refills: 3 | Status: SHIPPED | OUTPATIENT
Start: 2018-06-07 | End: 2019-07-18 | Stop reason: SDUPTHER

## 2018-06-07 RX ORDER — PRAVASTATIN SODIUM 10 MG
TABLET ORAL
Qty: 90 TABLET | Refills: 3 | Status: SHIPPED | OUTPATIENT
Start: 2018-06-07 | End: 2019-07-18

## 2018-06-07 RX ORDER — LISINOPRIL 2.5 MG/1
2.5 TABLET ORAL DAILY
Qty: 90 TABLET | Refills: 1 | Status: SHIPPED | OUTPATIENT
Start: 2018-06-07 | End: 2019-07-18

## 2018-06-07 RX ORDER — GLIMEPIRIDE 4 MG/1
TABLET ORAL
Qty: 180 TABLET | Refills: 1 | Status: SHIPPED | OUTPATIENT
Start: 2018-06-07 | End: 2019-07-18

## 2018-07-01 ASSESSMENT — ENCOUNTER SYMPTOMS
HEARTBURN: 0
BLOOD IN STOOL: 0
SHORTNESS OF BREATH: 0
CHOKING: 0
BLURRED VISION: 1
ABDOMINAL PAIN: 0
TROUBLE SWALLOWING: 0
CHEST TIGHTNESS: 0

## 2018-07-01 ASSESSMENT — COPD QUESTIONNAIRES: COPD: 1

## 2018-07-01 NOTE — PROGRESS NOTES
Adrián Martin is a 66 y.o. male smoker, with celiac disease, memory loss, who presents with     Chief Complaint   Patient presents with    Diabetes     fasting home , A1C 8.6, slight decrease    COPD       Interim history: since the past visit, one ER visit 3 months ago for acute gastritis, fully resolved. No hospital admissions, feels well, remains independent in ADLs, could not stop smoking. Comes to the office with friend who interprets from [facility] [place]      The following laboratory reports since the past visit were reviewed (the ones pertinent to today's visit were discussed with the patient):    Office Visit on 06/07/2018   Component Date Value Ref Range Status    Hemoglobin A1C 06/07/2018 8.6  % Final   Admission on 03/25/2018, Discharged on 03/25/2018   Component Date Value Ref Range Status    WBC 03/25/2018 8.0  4.8 - 10.8 K/uL Final    RBC 03/25/2018 4.35* 4.70 - 6.10 M/uL Final    Hemoglobin 03/25/2018 13.6* 14.0 - 18.0 g/dL Final    Hematocrit 03/25/2018 40.1* 42.0 - 52.0 % Final    MCV 03/25/2018 92.0  80.0 - 100.0 fL Final    MCH 03/25/2018 31.3  27.0 - 31.3 pg Final    MCHC 03/25/2018 34.0  33.0 - 37.0 % Final    RDW 03/25/2018 13.2  11.5 - 14.5 % Final    Platelets 10/12/3835 140  130 - 400 K/uL Final    Neutrophils % 03/25/2018 83.6  % Final    Lymphocytes % 03/25/2018 10.7  % Final    Monocytes % 03/25/2018 4.8  % Final    Eosinophils % 03/25/2018 0.6  % Final    Basophils % 03/25/2018 0.3  % Final    Neutrophils # 03/25/2018 6.7* 1.4 - 6.5 K/uL Final    Lymphocytes # 03/25/2018 0.9* 1.0 - 4.8 K/uL Final    Monocytes # 03/25/2018 0.4  0.2 - 0.8 K/uL Final    Eosinophils # 03/25/2018 0.1  0.0 - 0.7 K/uL Final    Basophils # 03/25/2018 0.0  0.0 - 0.2 K/uL Final    Sodium 03/25/2018 139  132 - 144 mEq/L Final    Potassium 03/25/2018 4.5  3.5 - 5.1 mEq/L Final    Chloride 03/25/2018 97* 98 - 107 mEq/L Final    CO2 03/25/2018 29  22 - 29 mEq/L Final    Anion Gap 03/25/2018 chronic problem. The current episode started more than 1 year ago. Associated symptoms include sweats. Pertinent negatives include no appetite change, chest pain, dyspnea on exertion, headaches, heartburn, malaise/fatigue, myalgias, trouble swallowing or weight loss. His symptoms are aggravated by change in weather, exposure to smoke and URI. His symptoms are not alleviated by beta-agonist. Risk factors for lung disease include smoking/tobacco exposure. His past medical history is significant for COPD. There is no history of asthma or bronchiectasis. More detail above in the chief complaint(s), interim history and below in the review of systems.      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 ([facility] [address]     Vitamin D deficiency 2014       Past Surgical History:   Procedure Laterality Date    CARPAL TUNNEL RELEASE Right 2013    Dr Aisha Lainez Left     Dr Jemima Collins    cancer       Social History     Social History    Marital status:      Spouse name: N/A    Number of children: 7    Years of education: N/A     Occupational History    construction, retired      Social History Main Topics    Smoking status: Current Every Day Smoker     Packs/day: 1.00     Types: Cigarettes     Start date: 9/6/1994    Smokeless tobacco: Never Used      Comment: tried patches but got rash    Alcohol use Not on file    Drug use: No    Sexual activity: Not on file     Other Topics Concern    Not on file     Social History Narrative    Born in [place], one of 8    Came to the [address] [place] [address],[address], worked in construction the context of chronic problems) was discussed with patient (/and caregiver if present), questions answered. Diabetes Counseling   Patient was again counseled regarding diabetes as a chronic illness with long term risk for complications affecting the kidneys, eyes, nerves, blood vessels. The importance of maintaining a healthy lifestyle, checking blood sugar 3-4 times daily at home and keeping log, watching blood pressure, caloric intake, weight and physical activity were also addressed during today's office visit. Smoking cessation discussed, risks explained, motivational counseling done, smoking cessation aids offered, patient prefers to focus on a personal approach to this problem and ask for our help as needed. Side effects, adverse effects of the medication prescribed (or refilled) today, treatment plan/ rationale and result expectations have (again) been discussed with the patient who expresses understanding and consents to proceed as outlined above. Additional advise included in the \"after visit summary\".        Orders Placed This Encounter   Medications    insulin detemir (LEVEMIR FLEXTOUCH) 100 UNIT/ML injection pen     Sig: Inject 12 Units into the skin nightly     Dispense:  5 pen     Refill:  1    metFORMIN (GLUCOPHAGE) 500 MG tablet     Sig: TAKE 2 TABLETS BY MOUTH TWICE DAILY     Dispense:  360 tablet     Refill:  1    memantine (NAMENDA) 10 MG tablet     Sig: TAKE 1 TABLET BY MOUTH TWICE DAILY     Dispense:  180 tablet     Refill:  1    pravastatin (PRAVACHOL) 10 MG tablet     Sig: TAKE 1 TABLET BY MOUTH EVERY NIGHT     Dispense:  90 tablet     Refill:  3     **Patient requests 90 days supply**    lisinopril (ZESTRIL) 2.5 MG tablet     Sig: Take 1 tablet by mouth daily     Dispense:  90 tablet     Refill:  1    fluticasone (FLONASE) 50 MCG/ACT nasal spray     Si spray by Nasal route daily     Dispense:  1 Bottle     Refill:  3    SITagliptin (JANUVIA) 100 MG tablet     Sig: Take

## 2018-09-10 ENCOUNTER — OFFICE VISIT (OUTPATIENT)
Dept: INTERNAL MEDICINE CLINIC | Age: 78
End: 2018-09-10
Payer: COMMERCIAL

## 2018-09-10 VITALS
OXYGEN SATURATION: 98 % | HEART RATE: 88 BPM | SYSTOLIC BLOOD PRESSURE: 138 MMHG | BODY MASS INDEX: 21.69 KG/M2 | WEIGHT: 135 LBS | TEMPERATURE: 96 F | HEIGHT: 66 IN | DIASTOLIC BLOOD PRESSURE: 80 MMHG

## 2018-09-10 DIAGNOSIS — J44.9 CHRONIC OBSTRUCTIVE PULMONARY DISEASE, UNSPECIFIED COPD TYPE (HCC): ICD-10-CM

## 2018-09-10 DIAGNOSIS — Z23 NEED FOR 23-POLYVALENT PNEUMOCOCCAL POLYSACCHARIDE VACCINE: ICD-10-CM

## 2018-09-10 DIAGNOSIS — R63.4 WEIGHT LOSS, NON-INTENTIONAL: ICD-10-CM

## 2018-09-10 DIAGNOSIS — E11.3299 CONTROLLED TYPE 2 DIABETES MELLITUS WITH MILD NONPROLIFERATIVE RETINOPATHY, WITH LONG-TERM CURRENT USE OF INSULIN, MACULAR EDEMA PRESENCE UNSPECIFIED, UNSPECIFIED LATERALITY (HCC): Primary | ICD-10-CM

## 2018-09-10 DIAGNOSIS — Z79.4 CONTROLLED TYPE 2 DIABETES MELLITUS WITH MILD NONPROLIFERATIVE RETINOPATHY, WITH LONG-TERM CURRENT USE OF INSULIN, MACULAR EDEMA PRESENCE UNSPECIFIED, UNSPECIFIED LATERALITY (HCC): Primary | ICD-10-CM

## 2018-09-10 LAB — HBA1C MFR BLD: 7.6 %

## 2018-09-10 PROCEDURE — G8926 SPIRO NO PERF OR DOC: HCPCS | Performed by: INTERNAL MEDICINE

## 2018-09-10 PROCEDURE — G0009 ADMIN PNEUMOCOCCAL VACCINE: HCPCS | Performed by: INTERNAL MEDICINE

## 2018-09-10 PROCEDURE — 4040F PNEUMOC VAC/ADMIN/RCVD: CPT | Performed by: INTERNAL MEDICINE

## 2018-09-10 PROCEDURE — 1101F PT FALLS ASSESS-DOCD LE1/YR: CPT | Performed by: INTERNAL MEDICINE

## 2018-09-10 PROCEDURE — 1123F ACP DISCUSS/DSCN MKR DOCD: CPT | Performed by: INTERNAL MEDICINE

## 2018-09-10 PROCEDURE — 83036 HEMOGLOBIN GLYCOSYLATED A1C: CPT | Performed by: INTERNAL MEDICINE

## 2018-09-10 PROCEDURE — G8420 CALC BMI NORM PARAMETERS: HCPCS | Performed by: INTERNAL MEDICINE

## 2018-09-10 PROCEDURE — 4004F PT TOBACCO SCREEN RCVD TLK: CPT | Performed by: INTERNAL MEDICINE

## 2018-09-10 PROCEDURE — G8427 DOCREV CUR MEDS BY ELIG CLIN: HCPCS | Performed by: INTERNAL MEDICINE

## 2018-09-10 PROCEDURE — 99214 OFFICE O/P EST MOD 30 MIN: CPT | Performed by: INTERNAL MEDICINE

## 2018-09-10 PROCEDURE — 90732 PPSV23 VACC 2 YRS+ SUBQ/IM: CPT | Performed by: INTERNAL MEDICINE

## 2018-09-10 PROCEDURE — 3023F SPIROM DOC REV: CPT | Performed by: INTERNAL MEDICINE

## 2018-09-10 ASSESSMENT — COPD QUESTIONNAIRES: COPD: 1

## 2018-09-10 ASSESSMENT — ENCOUNTER SYMPTOMS
TROUBLE SWALLOWING: 0
BLURRED VISION: 1
HEARTBURN: 0
SHORTNESS OF BREATH: 0

## 2018-09-10 NOTE — PROGRESS NOTES
Nida Koyanagi is a 66 y.o. male smoker with COPD, memory impairment, who presents with     Chief Complaint   Patient presents with    Diabetes     fasting home , A1C 7.6    COPD    Other     food lost taste x past 3 months, has lost more weight         Interim history: The patient comes to the office visit today accompanied by a friend who interprets from [facility] [place] Since the past office visit, 6 months ago, he has been in the emergency room once with acute gastroenteritis which has resolved. Continues to live independently but his family decided it's time to drinking closer to them to take care of him since his memory has been getting worse. The patient is planning to relocate to [place] by the end of this month. He looks forward to the change although he lives behind several good friends. The following laboratory reports since the past visit were reviewed (the ones pertinent to today's visit were discussed with the patient):    Office Visit on 09/10/2018   Component Date Value Ref Range Status    Hemoglobin A1C 09/10/2018 7.6  % Final       HPI:    Diabetes   He presents for his follow-up diabetic visit. He has type 2 diabetes mellitus. His disease course has been fluctuating. Hypoglycemia symptoms include sweats. Pertinent negatives for hypoglycemia include no confusion, dizziness, headaches, nervousness/anxiousness or speech difficulty. Associated symptoms include blurred vision. Pertinent negatives for diabetes include no chest pain, no fatigue, no foot paresthesias, no foot ulcerations, no polydipsia, no polyuria, no weakness and no weight loss. There are no hypoglycemic complications. Pertinent negatives for hypoglycemia complications include no hospitalization, no nocturnal hypoglycemia and no required assistance. Symptoms are stable. Diabetic complications include retinopathy. Pertinent negatives for diabetic complications include no CVA, nephropathy, peripheral neuropathy or PVD.  Risk factors for coronary artery disease include male sex, sedentary lifestyle, diabetes mellitus and tobacco exposure. Current diabetic treatment includes oral agent (triple therapy) and insulin injections. He is compliant with treatment most of the time. His weight is stable. He is following a generally healthy diet. When asked about meal planning, he reported none. He has not had a previous visit with a dietitian. He participates in exercise intermittently. His home blood glucose trend is fluctuating minimally. His breakfast blood glucose range is generally 110-130 mg/dl. His overall blood glucose range is >200 mg/dl. An ACE inhibitor/angiotensin II receptor blocker is being taken. He does not see a podiatrist.Eye exam is current. Hypertension   Associated symptoms include blurred vision and sweats. Pertinent negatives include no chest pain, headaches, malaise/fatigue or shortness of breath. Hypertensive end-organ damage includes retinopathy. There is no history of CVA or PVD. Other   Pertinent negatives include no chest pain, fatigue, headaches, myalgias or weakness. COPD   There is no shortness of breath. This is a chronic problem. The current episode started more than 1 year ago. Associated symptoms include sweats. Pertinent negatives include no appetite change, chest pain, dyspnea on exertion, headaches, heartburn, malaise/fatigue, myalgias, trouble swallowing or weight loss. His symptoms are aggravated by change in weather, exposure to smoke and URI. His symptoms are not alleviated by beta-agonist. Risk factors for lung disease include smoking/tobacco exposure. His past medical history is significant for COPD. There is no history of asthma or bronchiectasis. More detail above in the chief complaint(s), interim history and below in the review of systems.      Past Medical History:   Diagnosis Date    Adult celiac disease 2013    COPD (chronic obstructive pulmonary disease) (HCC)     Deformity of toe tablet 3    lisinopril (ZESTRIL) 2.5 MG tablet Take 1 tablet by mouth daily 90 tablet 1    fluticasone (FLONASE) 50 MCG/ACT nasal spray 1 spray by Nasal route daily 1 Bottle 3    SITagliptin (JANUVIA) 100 MG tablet Take 1 tablet by mouth daily 90 tablet 1    albuterol sulfate  (90 Base) MCG/ACT inhaler Inhale 2 puffs into the lungs every 6 hours as needed for Wheezing 3 Inhaler 1    buPROPion (WELLBUTRIN) 75 MG tablet TAKE 1 TABLET BY MOUTH TWICE DAILY 180 tablet 1    glucose blood VI test strips (ONE TOUCH ULTRA TEST) strip TEST THREE TIMES DAILY 300 strip 3    glimepiride (AMARYL) 4 MG tablet TAKE 1 TABLET BY MOUTH EVERY MORNING AND 1 TABLET AT NOON 180 tablet 1    omeprazole (PRILOSEC) 20 MG delayed release capsule TAKE 1 CAPSULE BY MOUTH DAILY AS NEEDED FOR HEARTBURN 90 capsule 1    vitamin C (ASCORBIC ACID) 500 MG tablet Take 1,000 mg by mouth daily      ondansetron (ZOFRAN ODT) 4 MG disintegrating tablet Take 1 tablet by mouth every 8 hours as needed for Nausea 14 tablet 0    Insulin Pen Needle (B-D UF III MINI PEN NEEDLES) 31G X 5 MM MISC 2 each by Does not apply route 2 times daily DX : E11.9, IDDM 200 each 3    Cholecalciferol (VITAMIN D3) 2000 UNITS TABS Take one tablet po 3 times weekly, take with a meal with fat content 45 tablet 1    vitamin B-12 (CYANOCOBALAMIN) 1000 MCG tablet Take 1,000 mcg by mouth daily.  aspirin EC 81 MG EC tablet Take 81 mg by mouth daily. No current facility-administered medications on file prior to visit. Review of Systems   Constitutional: Negative for appetite change, fatigue, malaise/fatigue and weight loss. HENT: Negative for trouble swallowing. Eyes: Positive for blurred vision. Respiratory: Negative for shortness of breath. Cardiovascular: Negative for chest pain and dyspnea on exertion. Gastrointestinal: Negative for heartburn. Endocrine: Negative for polydipsia and polyuria.    Musculoskeletal: Negative for

## 2019-07-16 ENCOUNTER — TELEPHONE (OUTPATIENT)
Dept: FAMILY MEDICINE CLINIC | Age: 79
End: 2019-07-16

## 2019-07-18 ENCOUNTER — OFFICE VISIT (OUTPATIENT)
Dept: FAMILY MEDICINE CLINIC | Age: 79
End: 2019-07-18
Payer: COMMERCIAL

## 2019-07-18 VITALS
HEART RATE: 73 BPM | DIASTOLIC BLOOD PRESSURE: 50 MMHG | SYSTOLIC BLOOD PRESSURE: 108 MMHG | HEIGHT: 66 IN | OXYGEN SATURATION: 97 % | TEMPERATURE: 98 F | BODY MASS INDEX: 19.61 KG/M2 | WEIGHT: 122 LBS | RESPIRATION RATE: 14 BRPM

## 2019-07-18 DIAGNOSIS — Z13.29 THYROID DISORDER SCREEN: ICD-10-CM

## 2019-07-18 DIAGNOSIS — J30.2 SEASONAL ALLERGIC RHINITIS, UNSPECIFIED TRIGGER: Chronic | ICD-10-CM

## 2019-07-18 DIAGNOSIS — D50.8 ANEMIA, IRON DEFICIENCY, INADEQUATE DIETARY INTAKE: ICD-10-CM

## 2019-07-18 DIAGNOSIS — R41.3 MEMORY LOSS OR IMPAIRMENT: ICD-10-CM

## 2019-07-18 DIAGNOSIS — Z79.4 CONTROLLED TYPE 2 DIABETES MELLITUS WITH MILD NONPROLIFERATIVE RETINOPATHY, WITH LONG-TERM CURRENT USE OF INSULIN, MACULAR EDEMA PRESENCE UNSPECIFIED, UNSPECIFIED LATERALITY (HCC): ICD-10-CM

## 2019-07-18 DIAGNOSIS — Z23 NEED FOR SHINGLES VACCINE: ICD-10-CM

## 2019-07-18 DIAGNOSIS — J44.9 CHRONIC OBSTRUCTIVE PULMONARY DISEASE, UNSPECIFIED COPD TYPE (HCC): ICD-10-CM

## 2019-07-18 DIAGNOSIS — R43.2 DYSGEUSIA: Chronic | ICD-10-CM

## 2019-07-18 DIAGNOSIS — E11.3299 CONTROLLED TYPE 2 DIABETES MELLITUS WITH MILD NONPROLIFERATIVE RETINOPATHY, WITH LONG-TERM CURRENT USE OF INSULIN, MACULAR EDEMA PRESENCE UNSPECIFIED, UNSPECIFIED LATERALITY (HCC): ICD-10-CM

## 2019-07-18 DIAGNOSIS — R63.4 WEIGHT LOSS: Chronic | ICD-10-CM

## 2019-07-18 DIAGNOSIS — Z72.0 TOBACCO ABUSE: Chronic | ICD-10-CM

## 2019-07-18 DIAGNOSIS — I10 ESSENTIAL HYPERTENSION: Primary | Chronic | ICD-10-CM

## 2019-07-18 DIAGNOSIS — R31.9 HEMATURIA, UNSPECIFIED TYPE: ICD-10-CM

## 2019-07-18 PROBLEM — L03.115 CELLULITIS OF RIGHT FOOT: Status: RESOLVED | Noted: 2017-08-28 | Resolved: 2019-07-18

## 2019-07-18 PROCEDURE — 1123F ACP DISCUSS/DSCN MKR DOCD: CPT | Performed by: FAMILY MEDICINE

## 2019-07-18 PROCEDURE — G8926 SPIRO NO PERF OR DOC: HCPCS | Performed by: FAMILY MEDICINE

## 2019-07-18 PROCEDURE — G8420 CALC BMI NORM PARAMETERS: HCPCS | Performed by: FAMILY MEDICINE

## 2019-07-18 PROCEDURE — 4040F PNEUMOC VAC/ADMIN/RCVD: CPT | Performed by: FAMILY MEDICINE

## 2019-07-18 PROCEDURE — G8427 DOCREV CUR MEDS BY ELIG CLIN: HCPCS | Performed by: FAMILY MEDICINE

## 2019-07-18 PROCEDURE — 3023F SPIROM DOC REV: CPT | Performed by: FAMILY MEDICINE

## 2019-07-18 PROCEDURE — 4004F PT TOBACCO SCREEN RCVD TLK: CPT | Performed by: FAMILY MEDICINE

## 2019-07-18 PROCEDURE — 99215 OFFICE O/P EST HI 40 MIN: CPT | Performed by: FAMILY MEDICINE

## 2019-07-18 RX ORDER — LANOLIN ALCOHOL/MO/W.PET/CERES
1000 CREAM (GRAM) TOPICAL DAILY
Qty: 30 TABLET | Refills: 3 | Status: CANCELLED | OUTPATIENT
Start: 2019-07-18

## 2019-07-18 RX ORDER — FLUTICASONE PROPIONATE 50 MCG
1 SPRAY, SUSPENSION (ML) NASAL DAILY
Qty: 1 BOTTLE | Refills: 3 | Status: SHIPPED | OUTPATIENT
Start: 2019-07-18 | End: 2020-09-23 | Stop reason: SDUPTHER

## 2019-07-18 RX ORDER — INSULIN GLARGINE 100 [IU]/ML
INJECTION, SOLUTION SUBCUTANEOUS
Qty: 1 VIAL | Status: CANCELLED | OUTPATIENT
Start: 2019-07-18

## 2019-07-18 RX ORDER — DONEPEZIL HYDROCHLORIDE 10 MG/1
10 TABLET, FILM COATED ORAL NIGHTLY
Refills: 1 | Status: CANCELLED | OUTPATIENT
Start: 2019-07-18

## 2019-07-18 RX ORDER — DONEPEZIL HYDROCHLORIDE 10 MG/1
10 TABLET, FILM COATED ORAL NIGHTLY
COMMUNITY
End: 2019-07-18 | Stop reason: ALTCHOICE

## 2019-07-18 RX ORDER — INSULIN GLARGINE 100 [IU]/ML
INJECTION, SOLUTION SUBCUTANEOUS
COMMUNITY
Start: 2010-12-03 | End: 2019-07-18 | Stop reason: ALTCHOICE

## 2019-07-18 RX ORDER — OMEPRAZOLE 20 MG/1
CAPSULE, DELAYED RELEASE ORAL
Qty: 90 CAPSULE | Refills: 1 | Status: CANCELLED | OUTPATIENT
Start: 2019-07-18

## 2019-07-18 RX ORDER — ASCORBIC ACID 500 MG
1000 TABLET ORAL DAILY
Status: CANCELLED | OUTPATIENT
Start: 2019-07-18

## 2019-07-18 RX ORDER — ALBUTEROL SULFATE 90 UG/1
2 AEROSOL, METERED RESPIRATORY (INHALATION) EVERY 6 HOURS PRN
Qty: 3 INHALER | Refills: 1 | Status: SHIPPED | OUTPATIENT
Start: 2019-07-18 | End: 2020-06-09 | Stop reason: SDUPTHER

## 2019-07-18 ASSESSMENT — PATIENT HEALTH QUESTIONNAIRE - PHQ9
SUM OF ALL RESPONSES TO PHQ QUESTIONS 1-9: 0
2. FEELING DOWN, DEPRESSED OR HOPELESS: 0
SUM OF ALL RESPONSES TO PHQ9 QUESTIONS 1 & 2: 0
1. LITTLE INTEREST OR PLEASURE IN DOING THINGS: 0
SUM OF ALL RESPONSES TO PHQ QUESTIONS 1-9: 0

## 2019-07-18 NOTE — PROGRESS NOTES
 Exelon [Rivastigmine Tartrate] Rash and Other (See Comments)     burn    Vancomycin Rash     Current Outpatient Medications   Medication Sig Dispense Refill    fluticasone (FLONASE) 50 MCG/ACT nasal spray 1 spray by Nasal route daily 1 Bottle 3    SITagliptin (JANUVIA) 100 MG tablet Take 1 tablet by mouth daily 90 tablet 1    albuterol sulfate  (90 Base) MCG/ACT inhaler Inhale 2 puffs into the lungs every 6 hours as needed for Wheezing 3 Inhaler 1    metFORMIN (GLUCOPHAGE) 1000 MG tablet Take 1,000 mg by mouth 2 times daily (with meals)      Dulaglutide 0.75 MG/0.5ML SOPN Inject 0.75 mg into the skin every 7 days 5 pen 5    insulin glargine (LANTUS) 100 UNIT/ML injection pen Inject 10 Units into the skin nightly 5 pen 5    ONE TOUCH ULTRA TEST strip TEST THREE TIMES DAILY 300 strip 3    vitamin C (ASCORBIC ACID) 500 MG tablet Take 1,000 mg by mouth daily      vitamin B-12 (CYANOCOBALAMIN) 1000 MCG tablet Take 1,000 mcg by mouth daily. No current facility-administered medications for this visit. PMH, Surgical Hx, Family Hx, and Social Hxreviewed and updated. Health Maintenance reviewed. Objective    Vitals:    07/18/19 1142   BP: (!) 108/50   Site: Left Upper Arm   Position: Sitting   Cuff Size: Medium Adult   Pulse: 73   Resp: 14   Temp: 98 °F (36.7 °C)   TempSrc: Temporal   SpO2: 97%   Weight: 122 lb (55.3 kg)   Height: 5' 6\" (1.676 m)        Physical Exam   Constitutional: He is oriented to person, place, and time. He appears well-developed and well-nourished. HENT:   Head: Normocephalic and atraumatic. Mouth/Throat: No oropharyngeal exudate. Eyes: Conjunctivae are normal. No scleral icterus. Neck: Normal range of motion. Neck supple. Carotid bruit is not present. No thyromegaly present. Cardiovascular: Normal rate, regular rhythm, S1 normal, S2 normal, normal heart sounds and intact distal pulses.    Pulmonary/Chest: Effort normal and breath sounds normal. He has no wheezes. He has no rales. Abdominal: Soft. Bowel sounds are normal. He exhibits no distension and no mass. There is no tenderness. There is no rebound and no guarding. Musculoskeletal: He exhibits no edema. Lymphadenopathy:     He has no cervical adenopathy. Neurological: He is alert and oriented to person, place, and time. Diabetic foot exam: no deforms; no callouses; normal skin integrity; normal monofilament exam     Skin: Skin is warm and dry. Psychiatric: He has a normal mood and affect. His behavior is normal. Judgment and thought content normal.   No apparent memory loss. Patient recalls recent and remote events with apparent accuracy         Lab Results   Component Value Date    LABA1C 7.6 09/10/2018    LABA1C 8.6 06/07/2018    LABA1C 8.9 01/11/2018     Lab Results   Component Value Date    LABMICR 1.30 11/01/2014    CREATININE 0.89 03/25/2018     Lab Results   Component Value Date    ALT 18 03/25/2018    AST 23 03/25/2018     Lab Results   Component Value Date    CHOL 138 09/09/2017    TRIG 80 09/09/2017    HDL 47 09/09/2017    LDLCALC 75 09/09/2017        Assessment & Plan   Visit Diagnoses and Associated Orders     Essential hypertension    -  Primary    Stable and well-controlled with no meds. CBC With Auto Differential [id number] Custom]   - Future Order    Comprehensive Metabolic Panel [[id number] Custom]   - Future Order         Controlled type 2 diabetes mellitus with mild nonproliferative retinopathy, with long-term current use of insulin, macular edema presence unspecified, unspecified laterality (HCC)        Worse, good control. Given inconsistent use of Lantus and associated hypoglycemia, will change to Trulicity. Follow labs. Reviewed diet.       Amb External Referral To Ophthalmology [[id number] Custom]       DIABETES FOOT EXAM [[id number] Custom]      Hemoglobin A1C [[id number] Custom]   - Future Order    SITagliptin (JANUVIA) 100 MG tablet [[id number]]      Dulaglutide 0.75 MG/0.5ML SOPN [[id number]]

## 2019-07-26 DIAGNOSIS — Z79.4 CONTROLLED TYPE 2 DIABETES MELLITUS WITH MILD NONPROLIFERATIVE RETINOPATHY, WITH LONG-TERM CURRENT USE OF INSULIN, MACULAR EDEMA PRESENCE UNSPECIFIED, UNSPECIFIED LATERALITY (HCC): ICD-10-CM

## 2019-07-26 DIAGNOSIS — R63.4 WEIGHT LOSS: Chronic | ICD-10-CM

## 2019-07-26 DIAGNOSIS — E11.3299 CONTROLLED TYPE 2 DIABETES MELLITUS WITH MILD NONPROLIFERATIVE RETINOPATHY, WITH LONG-TERM CURRENT USE OF INSULIN, MACULAR EDEMA PRESENCE UNSPECIFIED, UNSPECIFIED LATERALITY (HCC): ICD-10-CM

## 2019-07-26 DIAGNOSIS — Z13.29 THYROID DISORDER SCREEN: ICD-10-CM

## 2019-07-26 DIAGNOSIS — R31.9 HEMATURIA, UNSPECIFIED TYPE: ICD-10-CM

## 2019-07-26 DIAGNOSIS — I10 ESSENTIAL HYPERTENSION: Chronic | ICD-10-CM

## 2019-07-26 LAB
ALBUMIN SERPL-MCNC: 4.3 G/DL (ref 3.5–4.6)
ALP BLD-CCNC: 54 U/L (ref 35–104)
ALT SERPL-CCNC: 11 U/L (ref 0–41)
ANION GAP SERPL CALCULATED.3IONS-SCNC: 10 MEQ/L (ref 9–15)
AST SERPL-CCNC: 18 U/L (ref 0–40)
BACTERIA: NEGATIVE /HPF
BASOPHILS ABSOLUTE: 0 K/UL (ref 0–0.2)
BASOPHILS RELATIVE PERCENT: 0.4 %
BILIRUB SERPL-MCNC: 0.4 MG/DL (ref 0.2–0.7)
BILIRUBIN URINE: NEGATIVE
BLOOD, URINE: NEGATIVE
BUN BLDV-MCNC: 11 MG/DL (ref 8–23)
CALCIUM SERPL-MCNC: 9.3 MG/DL (ref 8.5–9.9)
CHLORIDE BLD-SCNC: 97 MEQ/L (ref 95–107)
CLARITY: CLEAR
CO2: 29 MEQ/L (ref 20–31)
COLOR: ABNORMAL
CREAT SERPL-MCNC: 0.79 MG/DL (ref 0.7–1.2)
EOSINOPHILS ABSOLUTE: 0.1 K/UL (ref 0–0.7)
EOSINOPHILS RELATIVE PERCENT: 2 %
EPITHELIAL CELLS, UA: ABNORMAL /HPF (ref 0–5)
GFR AFRICAN AMERICAN: >60
GFR NON-AFRICAN AMERICAN: >60
GLOBULIN: 2.9 G/DL (ref 2.3–3.5)
GLUCOSE BLD-MCNC: 134 MG/DL (ref 70–99)
GLUCOSE URINE: NEGATIVE MG/DL
HBA1C MFR BLD: 8.3 % (ref 4.8–5.9)
HCT VFR BLD CALC: 35.4 % (ref 42–52)
HEMOGLOBIN: 12.2 G/DL (ref 14–18)
HYALINE CASTS: ABNORMAL /HPF (ref 0–5)
KETONES, URINE: ABNORMAL MG/DL
LEUKOCYTE ESTERASE, URINE: ABNORMAL
LYMPHOCYTES ABSOLUTE: 1.5 K/UL (ref 1–4.8)
LYMPHOCYTES RELATIVE PERCENT: 24.6 %
MCH RBC QN AUTO: 31.9 PG (ref 27–31.3)
MCHC RBC AUTO-ENTMCNC: 34.5 % (ref 33–37)
MCV RBC AUTO: 92.4 FL (ref 80–100)
MONOCYTES ABSOLUTE: 0.5 K/UL (ref 0.2–0.8)
MONOCYTES RELATIVE PERCENT: 7.6 %
NEUTROPHILS ABSOLUTE: 4 K/UL (ref 1.4–6.5)
NEUTROPHILS RELATIVE PERCENT: 65.4 %
NITRITE, URINE: NEGATIVE
PDW BLD-RTO: 15.4 % (ref 11.5–14.5)
PH UA: 5.5 (ref 5–9)
PLATELET # BLD: 151 K/UL (ref 130–400)
POTASSIUM SERPL-SCNC: 4.9 MEQ/L (ref 3.4–4.9)
PREALBUMIN: 26.3 MG/DL (ref 20–40)
PROTEIN UA: ABNORMAL MG/DL
RBC # BLD: 3.83 M/UL (ref 4.7–6.1)
RBC UA: ABNORMAL /HPF (ref 0–5)
SODIUM BLD-SCNC: 136 MEQ/L (ref 135–144)
SPECIFIC GRAVITY UA: 1.02 (ref 1–1.03)
TOTAL PROTEIN: 7.2 G/DL (ref 6.3–8)
TSH SERPL DL<=0.05 MIU/L-ACNC: 2.54 UIU/ML (ref 0.44–3.86)
UROBILINOGEN, URINE: 0.2 E.U./DL
WBC # BLD: 6.2 K/UL (ref 4.8–10.8)
WBC UA: ABNORMAL /HPF (ref 0–5)

## 2019-08-05 ENCOUNTER — OFFICE VISIT (OUTPATIENT)
Dept: PULMONOLOGY | Age: 79
End: 2019-08-05
Payer: MEDICARE

## 2019-08-05 VITALS
WEIGHT: 120 LBS | DIASTOLIC BLOOD PRESSURE: 70 MMHG | HEIGHT: 66 IN | BODY MASS INDEX: 19.29 KG/M2 | SYSTOLIC BLOOD PRESSURE: 110 MMHG | HEART RATE: 82 BPM | RESPIRATION RATE: 16 BRPM | OXYGEN SATURATION: 97 % | TEMPERATURE: 97.1 F

## 2019-08-05 DIAGNOSIS — J30.1 NON-SEASONAL ALLERGIC RHINITIS DUE TO POLLEN: ICD-10-CM

## 2019-08-05 DIAGNOSIS — Z72.0 TOBACCO ABUSE: ICD-10-CM

## 2019-08-05 DIAGNOSIS — J44.9 CHRONIC OBSTRUCTIVE PULMONARY DISEASE, UNSPECIFIED COPD TYPE (HCC): Primary | ICD-10-CM

## 2019-08-05 PROCEDURE — 4040F PNEUMOC VAC/ADMIN/RCVD: CPT | Performed by: INTERNAL MEDICINE

## 2019-08-05 PROCEDURE — 99214 OFFICE O/P EST MOD 30 MIN: CPT | Performed by: INTERNAL MEDICINE

## 2019-08-05 PROCEDURE — G8427 DOCREV CUR MEDS BY ELIG CLIN: HCPCS | Performed by: INTERNAL MEDICINE

## 2019-08-05 PROCEDURE — 4004F PT TOBACCO SCREEN RCVD TLK: CPT | Performed by: INTERNAL MEDICINE

## 2019-08-05 PROCEDURE — 3023F SPIROM DOC REV: CPT | Performed by: INTERNAL MEDICINE

## 2019-08-05 PROCEDURE — G8420 CALC BMI NORM PARAMETERS: HCPCS | Performed by: INTERNAL MEDICINE

## 2019-08-05 PROCEDURE — G8926 SPIRO NO PERF OR DOC: HCPCS | Performed by: INTERNAL MEDICINE

## 2019-08-05 PROCEDURE — 1123F ACP DISCUSS/DSCN MKR DOCD: CPT | Performed by: INTERNAL MEDICINE

## 2019-08-05 ASSESSMENT — ENCOUNTER SYMPTOMS
NAUSEA: 0
ABDOMINAL PAIN: 0
SORE THROAT: 0
EYE ITCHING: 0
RHINORRHEA: 0
VOMITING: 0
DIARRHEA: 0
VOICE CHANGE: 0
WHEEZING: 1
CHEST TIGHTNESS: 0
SHORTNESS OF BREATH: 0
COUGH: 1

## 2019-09-19 ENCOUNTER — HOSPITAL ENCOUNTER (EMERGENCY)
Age: 79
Discharge: HOME | End: 2019-09-19
Payer: MEDICARE

## 2019-09-19 VITALS
HEIGHT: 63 IN | RESPIRATION RATE: 18 BRPM | TEMPERATURE: 97.9 F | OXYGEN SATURATION: 97 % | SYSTOLIC BLOOD PRESSURE: 127 MMHG | WEIGHT: 120 LBS | DIASTOLIC BLOOD PRESSURE: 60 MMHG | BODY MASS INDEX: 21.26 KG/M2 | HEART RATE: 81 BPM

## 2019-09-19 DIAGNOSIS — H93.12 CLICKING TINNITUS OF LEFT EAR: Primary | ICD-10-CM

## 2019-09-19 PROCEDURE — 99282 EMERGENCY DEPT VISIT SF MDM: CPT

## 2019-09-19 ASSESSMENT — ENCOUNTER SYMPTOMS
SORE THROAT: 0
DIARRHEA: 0
RHINORRHEA: 0
BACK PAIN: 0
NAUSEA: 0
PHOTOPHOBIA: 0
SHORTNESS OF BREATH: 0
COUGH: 0
EYE PAIN: 0
VOMITING: 0
ABDOMINAL PAIN: 0

## 2019-09-19 NOTE — ED PROVIDER NOTES
[address] [facility] ED  EMERGENCY DEPARTMENT ENCOUNTER      Pt Name: Duglas Womack  MRN: [id number]  Anigfmarbella [birth year]  Date of evaluation: 9/19/2019  Provider: Noe Sarmiento PA-C      HISTORY OF PRESENT ILLNESS    Duglas Womack is a 78 y.o. male who presents to the Emergency Department with clicking noise in his left ear for the last 2 or 3 weeks. That will occasionally go away but then it comes back. He denies any fevers headache dizziness vision changes ear pain hearing loss. nothing makes it better or worse. REVIEW OF SYSTEMS       Review of Systems   Constitutional: Negative for chills, diaphoresis, fatigue and fever. HENT: Negative for congestion, rhinorrhea and sore throat. Clicking in left ear   Eyes: Negative for photophobia and pain. Respiratory: Negative for cough and shortness of breath. Cardiovascular: Negative for chest pain and palpitations. Gastrointestinal: Negative for abdominal pain, diarrhea, nausea and vomiting. Genitourinary: Negative for dysuria and flank pain. Musculoskeletal: Negative for back pain. Skin: Negative for rash. Neurological: Negative for dizziness, light-headedness and headaches. Psychiatric/Behavioral: Negative. All other systems reviewed and are negative.         PAST MEDICAL HISTORY     Past Medical History:   Diagnosis Date    Adult celiac disease 2013    Cellulitis of right foot 8/28/2017    COPD (chronic obstructive pulmonary disease) (HCC)     Deformity of toe of left foot     Diverticulosis of colon     Hernia, abdominal     Lung disease     Memory loss or impairment 2016    Personal history of testicular cancer     S/P colonoscopy with polypectomy 2013    DUE 2018    Tubular adenoma of colon 2013    Dr [facility] Vitamin D deficiency 2014         SURGICAL HISTORY       Past Surgical History:   Procedure Laterality Date    CARPAL TUNNEL RELEASE Right 2013    Dr Gustave Kanner Dr adenopathy. Neurological: He is alert and oriented to person, place, and time. He has normal strength. normal neurological exam.   Skin: Skin is warm, dry and intact. Capillary refill takes less than 2 seconds. No rash noted. He is not diaphoretic. Psychiatric: Judgment and thought content normal.   Nursing note and vitals reviewed. All other labs were within normal range or not returned as of this dictation. EMERGENCY DEPARTMENT COURSE and DIFFERENTIALDIAGNOSIS/MDM:   Vitals:    Vitals:    09/19/19 1153   BP: 127/60   Pulse: 81   Resp: 18   Temp: 97.9 °F (36.6 °C)   TempSrc: Oral   SpO2: 97%   Weight: 120 lb (54.4 kg)   Height: 5' 3\" (1.6 m)            Patient is nontoxic no acute distress. Denies hearing loss headaches vision troubles or any other constitutional symptoms. He is a normal motor or neurological exam.  Attempted ear irrigation for piece of wax in there but this was unsuccessful. Discussed case with Dr. Aster Kuo. We will have him follow-up with your nose and throat physician. No steroids at this time doing to being a diabetic and he does not take his sugars daily. Advised return to ED for new worsening or concerning symptoms. Patient and family verbalized understanding. Patient stable ready for discharge. PROCEDURES:  Unless otherwise noted below, none     Procedures      FINAL IMPRESSION      1.  Clicking tinnitus of left ear          DISPOSITION/PLAN   DISPOSITION Decision To Discharge 09/19/2019 12:45:26 PM          59 Nati Stone PA-C (electronically signed)  Attending Emergency Physician       Kate Stone PA-C  09/19/19 [id number]

## 2019-10-16 ENCOUNTER — OFFICE VISIT (OUTPATIENT)
Dept: FAMILY MEDICINE CLINIC | Age: 79
End: 2019-10-16
Payer: MEDICARE

## 2019-10-16 ENCOUNTER — HOSPITAL ENCOUNTER (OUTPATIENT)
Dept: GENERAL RADIOLOGY | Age: 79
Discharge: HOME | End: 2019-10-18
Payer: MEDICARE

## 2019-10-16 ENCOUNTER — OFFICE VISIT (OUTPATIENT)
Dept: PULMONOLOGY | Age: 79
End: 2019-10-16
Payer: MEDICARE

## 2019-10-16 VITALS
HEIGHT: 66 IN | TEMPERATURE: 97.7 F | SYSTOLIC BLOOD PRESSURE: 122 MMHG | WEIGHT: 123 LBS | RESPIRATION RATE: 16 BRPM | BODY MASS INDEX: 19.77 KG/M2 | HEART RATE: 79 BPM | OXYGEN SATURATION: 98 % | DIASTOLIC BLOOD PRESSURE: 60 MMHG

## 2019-10-16 VITALS
HEART RATE: 69 BPM | SYSTOLIC BLOOD PRESSURE: 122 MMHG | WEIGHT: 123 LBS | RESPIRATION RATE: 16 BRPM | OXYGEN SATURATION: 98 % | TEMPERATURE: 96.5 F | HEIGHT: 66 IN | BODY MASS INDEX: 19.77 KG/M2 | DIASTOLIC BLOOD PRESSURE: 60 MMHG

## 2019-10-16 DIAGNOSIS — E11.3299 CONTROLLED TYPE 2 DIABETES MELLITUS WITH MILD NONPROLIFERATIVE RETINOPATHY, WITH LONG-TERM CURRENT USE OF INSULIN, MACULAR EDEMA PRESENCE UNSPECIFIED, UNSPECIFIED LATERALITY (HCC): ICD-10-CM

## 2019-10-16 DIAGNOSIS — Z79.4 CONTROLLED TYPE 2 DIABETES MELLITUS WITH MILD NONPROLIFERATIVE RETINOPATHY, WITH LONG-TERM CURRENT USE OF INSULIN, MACULAR EDEMA PRESENCE UNSPECIFIED, UNSPECIFIED LATERALITY (HCC): ICD-10-CM

## 2019-10-16 DIAGNOSIS — Z72.0 TOBACCO ABUSE: ICD-10-CM

## 2019-10-16 DIAGNOSIS — J44.9 CHRONIC OBSTRUCTIVE PULMONARY DISEASE, UNSPECIFIED COPD TYPE (HCC): Primary | ICD-10-CM

## 2019-10-16 DIAGNOSIS — J44.9 CHRONIC OBSTRUCTIVE PULMONARY DISEASE, UNSPECIFIED COPD TYPE (HCC): ICD-10-CM

## 2019-10-16 DIAGNOSIS — E11.3299 NONPROLIFERATIVE DIABETIC RETINOPATHY (HCC): ICD-10-CM

## 2019-10-16 DIAGNOSIS — F02.80 LATE ONSET ALZHEIMER'S DISEASE WITHOUT BEHAVIORAL DISTURBANCE (HCC): Chronic | ICD-10-CM

## 2019-10-16 DIAGNOSIS — I10 ESSENTIAL HYPERTENSION: Primary | Chronic | ICD-10-CM

## 2019-10-16 DIAGNOSIS — G30.1 LATE ONSET ALZHEIMER'S DISEASE WITHOUT BEHAVIORAL DISTURBANCE (HCC): Chronic | ICD-10-CM

## 2019-10-16 DIAGNOSIS — J30.1 NON-SEASONAL ALLERGIC RHINITIS DUE TO POLLEN: ICD-10-CM

## 2019-10-16 LAB — HBA1C MFR BLD: 8.9 % (ref 4.8–5.9)

## 2019-10-16 PROCEDURE — 4040F PNEUMOC VAC/ADMIN/RCVD: CPT | Performed by: INTERNAL MEDICINE

## 2019-10-16 PROCEDURE — 3023F SPIROM DOC REV: CPT | Performed by: INTERNAL MEDICINE

## 2019-10-16 PROCEDURE — G8420 CALC BMI NORM PARAMETERS: HCPCS | Performed by: INTERNAL MEDICINE

## 2019-10-16 PROCEDURE — 4004F PT TOBACCO SCREEN RCVD TLK: CPT | Performed by: FAMILY MEDICINE

## 2019-10-16 PROCEDURE — 99214 OFFICE O/P EST MOD 30 MIN: CPT | Performed by: INTERNAL MEDICINE

## 2019-10-16 PROCEDURE — G8482 FLU IMMUNIZE ORDER/ADMIN: HCPCS | Performed by: INTERNAL MEDICINE

## 2019-10-16 PROCEDURE — 4004F PT TOBACCO SCREEN RCVD TLK: CPT | Performed by: INTERNAL MEDICINE

## 2019-10-16 PROCEDURE — G8420 CALC BMI NORM PARAMETERS: HCPCS | Performed by: FAMILY MEDICINE

## 2019-10-16 PROCEDURE — 1123F ACP DISCUSS/DSCN MKR DOCD: CPT | Performed by: FAMILY MEDICINE

## 2019-10-16 PROCEDURE — 71046 X-RAY EXAM CHEST 2 VIEWS: CPT

## 2019-10-16 PROCEDURE — G8427 DOCREV CUR MEDS BY ELIG CLIN: HCPCS | Performed by: INTERNAL MEDICINE

## 2019-10-16 PROCEDURE — 1123F ACP DISCUSS/DSCN MKR DOCD: CPT | Performed by: INTERNAL MEDICINE

## 2019-10-16 PROCEDURE — G8427 DOCREV CUR MEDS BY ELIG CLIN: HCPCS | Performed by: FAMILY MEDICINE

## 2019-10-16 PROCEDURE — 3023F SPIROM DOC REV: CPT | Performed by: FAMILY MEDICINE

## 2019-10-16 PROCEDURE — 99215 OFFICE O/P EST HI 40 MIN: CPT | Performed by: FAMILY MEDICINE

## 2019-10-16 PROCEDURE — 4040F PNEUMOC VAC/ADMIN/RCVD: CPT | Performed by: FAMILY MEDICINE

## 2019-10-16 PROCEDURE — G8926 SPIRO NO PERF OR DOC: HCPCS | Performed by: INTERNAL MEDICINE

## 2019-10-16 PROCEDURE — G8926 SPIRO NO PERF OR DOC: HCPCS | Performed by: FAMILY MEDICINE

## 2019-10-16 PROCEDURE — G8482 FLU IMMUNIZE ORDER/ADMIN: HCPCS | Performed by: FAMILY MEDICINE

## 2019-10-16 RX ORDER — MEMANTINE HYDROCHLORIDE 5 MG/1
5 TABLET ORAL 2 TIMES DAILY
Qty: 180 TABLET | Refills: 1 | Status: SHIPPED | OUTPATIENT
Start: 2019-10-16 | End: 2020-03-06

## 2019-10-16 ASSESSMENT — ENCOUNTER SYMPTOMS
SORE THROAT: 0
WHEEZING: 1
CHEST TIGHTNESS: 0
COUGH: 1
NAUSEA: 0
ABDOMINAL PAIN: 0
VOICE CHANGE: 0
DIARRHEA: 0
RHINORRHEA: 0
VOMITING: 0
SHORTNESS OF BREATH: 1
EYE ITCHING: 0

## 2019-11-01 ENCOUNTER — OFFICE VISIT (OUTPATIENT)
Dept: FAMILY MEDICINE CLINIC | Age: 79
End: 2019-11-01
Payer: MEDICARE

## 2019-11-01 VITALS
DIASTOLIC BLOOD PRESSURE: 60 MMHG | BODY MASS INDEX: 19.61 KG/M2 | WEIGHT: 122 LBS | RESPIRATION RATE: 16 BRPM | OXYGEN SATURATION: 97 % | HEIGHT: 66 IN | TEMPERATURE: 96.7 F | HEART RATE: 70 BPM | SYSTOLIC BLOOD PRESSURE: 118 MMHG

## 2019-11-01 DIAGNOSIS — E11.3299 CONTROLLED TYPE 2 DIABETES MELLITUS WITH MILD NONPROLIFERATIVE RETINOPATHY, WITH LONG-TERM CURRENT USE OF INSULIN, MACULAR EDEMA PRESENCE UNSPECIFIED, UNSPECIFIED LATERALITY (HCC): Primary | ICD-10-CM

## 2019-11-01 DIAGNOSIS — Z79.4 CONTROLLED TYPE 2 DIABETES MELLITUS WITH MILD NONPROLIFERATIVE RETINOPATHY, WITH LONG-TERM CURRENT USE OF INSULIN, MACULAR EDEMA PRESENCE UNSPECIFIED, UNSPECIFIED LATERALITY (HCC): Primary | ICD-10-CM

## 2019-11-01 PROCEDURE — G8482 FLU IMMUNIZE ORDER/ADMIN: HCPCS | Performed by: FAMILY MEDICINE

## 2019-11-01 PROCEDURE — G8427 DOCREV CUR MEDS BY ELIG CLIN: HCPCS | Performed by: FAMILY MEDICINE

## 2019-11-01 PROCEDURE — 4040F PNEUMOC VAC/ADMIN/RCVD: CPT | Performed by: FAMILY MEDICINE

## 2019-11-01 PROCEDURE — 99214 OFFICE O/P EST MOD 30 MIN: CPT | Performed by: FAMILY MEDICINE

## 2019-11-01 PROCEDURE — 1123F ACP DISCUSS/DSCN MKR DOCD: CPT | Performed by: FAMILY MEDICINE

## 2019-11-01 PROCEDURE — 4004F PT TOBACCO SCREEN RCVD TLK: CPT | Performed by: FAMILY MEDICINE

## 2019-11-01 PROCEDURE — G8420 CALC BMI NORM PARAMETERS: HCPCS | Performed by: FAMILY MEDICINE

## 2019-12-09 ENCOUNTER — HOSPITAL ENCOUNTER (OUTPATIENT)
Dept: PULMONOLOGY | Age: 79
Discharge: HOME | End: 2019-12-09
Payer: MEDICARE

## 2019-12-09 DIAGNOSIS — J44.9 CHRONIC OBSTRUCTIVE PULMONARY DISEASE, UNSPECIFIED COPD TYPE (HCC): ICD-10-CM

## 2019-12-09 PROCEDURE — 94060 EVALUATION OF WHEEZING: CPT

## 2019-12-09 PROCEDURE — 94729 DIFFUSING CAPACITY: CPT

## 2019-12-09 PROCEDURE — 94060 EVALUATION OF WHEEZING: CPT | Performed by: INTERNAL MEDICINE

## 2019-12-09 PROCEDURE — 94729 DIFFUSING CAPACITY: CPT | Performed by: INTERNAL MEDICINE

## 2019-12-09 PROCEDURE — 94726 PLETHYSMOGRAPHY LUNG VOLUMES: CPT

## 2019-12-09 PROCEDURE — 94726 PLETHYSMOGRAPHY LUNG VOLUMES: CPT | Performed by: INTERNAL MEDICINE

## 2019-12-09 PROCEDURE — 6360000002 HC RX W HCPCS: Performed by: INTERNAL MEDICINE

## 2019-12-09 RX ORDER — ALBUTEROL SULFATE 2.5 MG/3ML
2.5 SOLUTION RESPIRATORY (INHALATION) ONCE
Status: COMPLETED | OUTPATIENT
Start: 2019-12-09 | End: 2019-12-09

## 2019-12-09 RX ADMIN — ALBUTEROL SULFATE 2.5 MG: 2.5 SOLUTION RESPIRATORY (INHALATION) at 09:01

## 2019-12-13 ENCOUNTER — OFFICE VISIT (OUTPATIENT)
Dept: FAMILY MEDICINE CLINIC | Age: 79
End: 2019-12-13
Payer: MEDICARE

## 2019-12-13 VITALS
SYSTOLIC BLOOD PRESSURE: 122 MMHG | OXYGEN SATURATION: 98 % | HEIGHT: 66 IN | DIASTOLIC BLOOD PRESSURE: 62 MMHG | WEIGHT: 125 LBS | TEMPERATURE: 97 F | BODY MASS INDEX: 20.09 KG/M2 | RESPIRATION RATE: 16 BRPM | HEART RATE: 91 BPM

## 2019-12-13 DIAGNOSIS — Z00.00 ROUTINE GENERAL MEDICAL EXAMINATION AT A HEALTH CARE FACILITY: Primary | ICD-10-CM

## 2019-12-13 DIAGNOSIS — Z79.4 CONTROLLED TYPE 2 DIABETES MELLITUS WITH RIGHT EYE AFFECTED BY MILD NONPROLIFERATIVE RETINOPATHY AND MACULAR EDEMA, WITH LONG-TERM CURRENT USE OF INSULIN (HCC): ICD-10-CM

## 2019-12-13 DIAGNOSIS — E11.3211 CONTROLLED TYPE 2 DIABETES MELLITUS WITH RIGHT EYE AFFECTED BY MILD NONPROLIFERATIVE RETINOPATHY AND MACULAR EDEMA, WITH LONG-TERM CURRENT USE OF INSULIN (HCC): ICD-10-CM

## 2019-12-13 DIAGNOSIS — J44.9 CHRONIC OBSTRUCTIVE PULMONARY DISEASE, UNSPECIFIED COPD TYPE (HCC): ICD-10-CM

## 2019-12-13 PROCEDURE — 4040F PNEUMOC VAC/ADMIN/RCVD: CPT | Performed by: FAMILY MEDICINE

## 2019-12-13 PROCEDURE — G0438 PPPS, INITIAL VISIT: HCPCS | Performed by: FAMILY MEDICINE

## 2019-12-13 PROCEDURE — G8482 FLU IMMUNIZE ORDER/ADMIN: HCPCS | Performed by: FAMILY MEDICINE

## 2019-12-13 PROCEDURE — 1123F ACP DISCUSS/DSCN MKR DOCD: CPT | Performed by: FAMILY MEDICINE

## 2019-12-13 ASSESSMENT — PATIENT HEALTH QUESTIONNAIRE - PHQ9
SUM OF ALL RESPONSES TO PHQ QUESTIONS 1-9: 0
SUM OF ALL RESPONSES TO PHQ QUESTIONS 1-9: 0

## 2019-12-13 ASSESSMENT — LIFESTYLE VARIABLES
HOW OFTEN DO YOU HAVE SIX OR MORE DRINKS ON ONE OCCASION: 0
HOW OFTEN DO YOU HAVE A DRINK CONTAINING ALCOHOL: 4
HOW MANY STANDARD DRINKS CONTAINING ALCOHOL DO YOU HAVE ON A TYPICAL DAY: 0
AUDIT-C TOTAL SCORE: 4

## 2020-01-31 ENCOUNTER — OFFICE VISIT (OUTPATIENT)
Dept: FAMILY MEDICINE CLINIC | Age: 80
End: 2020-01-31
Payer: MEDICARE

## 2020-01-31 VITALS
OXYGEN SATURATION: 98 % | BODY MASS INDEX: 20.38 KG/M2 | TEMPERATURE: 96.5 F | HEIGHT: 66 IN | RESPIRATION RATE: 16 BRPM | WEIGHT: 126.8 LBS | HEART RATE: 80 BPM | SYSTOLIC BLOOD PRESSURE: 112 MMHG | DIASTOLIC BLOOD PRESSURE: 64 MMHG

## 2020-01-31 PROCEDURE — G8482 FLU IMMUNIZE ORDER/ADMIN: HCPCS | Performed by: FAMILY MEDICINE

## 2020-01-31 PROCEDURE — 99214 OFFICE O/P EST MOD 30 MIN: CPT | Performed by: FAMILY MEDICINE

## 2020-01-31 PROCEDURE — G8926 SPIRO NO PERF OR DOC: HCPCS | Performed by: FAMILY MEDICINE

## 2020-01-31 PROCEDURE — 3023F SPIROM DOC REV: CPT | Performed by: FAMILY MEDICINE

## 2020-01-31 PROCEDURE — 1123F ACP DISCUSS/DSCN MKR DOCD: CPT | Performed by: FAMILY MEDICINE

## 2020-01-31 PROCEDURE — G8420 CALC BMI NORM PARAMETERS: HCPCS | Performed by: FAMILY MEDICINE

## 2020-01-31 PROCEDURE — G8427 DOCREV CUR MEDS BY ELIG CLIN: HCPCS | Performed by: FAMILY MEDICINE

## 2020-01-31 PROCEDURE — 4040F PNEUMOC VAC/ADMIN/RCVD: CPT | Performed by: FAMILY MEDICINE

## 2020-01-31 PROCEDURE — 4004F PT TOBACCO SCREEN RCVD TLK: CPT | Performed by: FAMILY MEDICINE

## 2020-01-31 RX ORDER — INSULIN GLARGINE 100 [IU]/ML
INJECTION, SOLUTION SUBCUTANEOUS
COMMUNITY
Start: 2019-12-15 | End: 2020-01-31 | Stop reason: ALTCHOICE

## 2020-01-31 RX ORDER — COVID-19 ANTIGEN TEST
KIT MISCELLANEOUS
COMMUNITY
End: 2020-12-21

## 2020-01-31 ASSESSMENT — PATIENT HEALTH QUESTIONNAIRE - PHQ9
1. LITTLE INTEREST OR PLEASURE IN DOING THINGS: 0
DEPRESSION UNABLE TO ASSESS: PT REFUSES
SUM OF ALL RESPONSES TO PHQ QUESTIONS 1-9: 0
2. FEELING DOWN, DEPRESSED OR HOPELESS: 0
SUM OF ALL RESPONSES TO PHQ QUESTIONS 1-9: 0
SUM OF ALL RESPONSES TO PHQ9 QUESTIONS 1 & 2: 0

## 2020-01-31 NOTE — PROGRESS NOTES
Subjective  Holgersukhdev Verdugo, 78 y.o. male presents today with:  Chief Complaint   Patient presents with    Diabetes     Patient present for his Chronic Disease Visit. Patient states feeling good. HPI    We have been trying to stop insulin d/t dementia. Mr. Oliva Verdugo continues to use insulin sometimes. Says he uses it 2-3 times a week at night if he hasn't a lot of food and his sugars are high in the evening because otherwise his sugars in the morning would be over 200. He is unable to recall his CBG levels of today and yesterday. He is only taking two medicines right now. He is taking metformin and Januvia but not jardiance. He does not acknowledge hypo-or hyperglycemic symptoms. Patient is here for f/u HTN. Is on no medications. Avoids added salt. Tries to eat healthy. Exercises occasionally. Has no chest pain, shortness of breath, palpitations or edema. COPD. On no medications no significant cough, no shortness of breath, no wheezing, no chest tightness. Accompanied by neighbor. No other questions and or concerns for today's visit      Review of Systems  No fevers, chills, sweats. No unintended weight loss. No abdominal pain, nausea, vomiting, diarrhea, constipation, bloody stools, black tarry stools. No rashes. No swollen glands.       Past Medical History:   Diagnosis Date    Adult celiac disease 2013    Cellulitis of right foot 8/28/2017    COPD (chronic obstructive pulmonary disease) (HCC)     Deformity of toe of left foot     Diverticulosis of colon     Hernia, abdominal     Lung disease     Memory loss or impairment 2016    Personal history of testicular cancer     S/P colonoscopy with polypectomy 2013    DUE 2018    Tubular adenoma of colon 2013    Dr Tone Mena    Vitamin D deficiency 2014     Past Surgical History:   Procedure Laterality Date    CARPAL TUNNEL RELEASE Right 2013    Dr Laura Jacobo Left     Dr Jodi Mosquera 1990    cancer     Social History     Socioeconomic History    Marital status:      Spouse name: Not on file    Number of children: 9    Years of education: Not on file    Highest education level: Not on file   Occupational History    Occupation: construction, retired   Social Needs    Financial resource strain: Not on file    Food insecurity:     Worry: Not on file     Inability: Not on file   [employer] needs:     Medical: Not on file     Non-medical: Not on file   Tobacco Use    Smoking status: Current Every Day Smoker     Packs/day: 1.00     Years: 40.00     Pack years: 40.00     Types: Cigarettes     Start date: 8/5/1957    Smokeless tobacco: Never Used    Tobacco comment: tried patches but got rash   Substance and Sexual Activity    Alcohol use: Not on file    Drug use: No    Sexual activity: Not on file   Lifestyle    Physical activity:     Days per week: Not on file     Minutes per session: Not on file    Stress: Not on file   Relationships    Social connections:     Talks on phone: Not on file     Gets together: Not on file     Attends Sabianism service: Not on file     Active member of club or organization: Not on file     Attends meetings of clubs or organizations: Not on file     Relationship status: Not on file    Intimate partner violence:     Fear of current or ex partner: Not on file     Emotionally abused: Not on file     Physically abused: Not on file     Forced sexual activity: Not on file   Other Topics Concern    Not on file   Social History Narrative    Born in [place], one of 8    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

## 2020-03-05 NOTE — TELEPHONE ENCOUNTER
Pharmacy is requesting medication refill.  Please approve or deny this request.    Rx requested:  Requested Prescriptions     Pending Prescriptions Disp Refills    memantine (NAMENDA) 5 MG tablet [Pharmacy Med Name: MEMANTINE 5MG TABLETS] 180 tablet 1     Sig: TAKE 1 TABLET BY MOUTH TWICE DAILY         Last Office Visit:   1/31/2020      Next Visit Date:  Future Appointments   Date Time Provider Tommy Cordon   3/31/2020  2:30 PM Oseas Arnold MD [address] [place] [address]

## 2020-03-06 RX ORDER — MEMANTINE HYDROCHLORIDE 5 MG/1
TABLET ORAL
Qty: 180 TABLET | Refills: 1 | Status: SHIPPED | OUTPATIENT
Start: 2020-03-06 | End: 2020-03-31 | Stop reason: SDUPTHER

## 2020-03-31 ENCOUNTER — VIRTUAL VISIT (OUTPATIENT)
Dept: FAMILY MEDICINE CLINIC | Age: 80
End: 2020-03-31
Payer: MEDICARE

## 2020-03-31 PROBLEM — E53.8 VITAMIN B12 DEFICIENCY: Status: ACTIVE | Noted: 2020-03-31

## 2020-03-31 PROBLEM — E55.9 VITAMIN D DEFICIENCY: Status: ACTIVE | Noted: 2020-03-31

## 2020-03-31 PROCEDURE — 99442 PR PHYS/QHP TELEPHONE EVALUATION 11-20 MIN: CPT | Performed by: FAMILY MEDICINE

## 2020-03-31 RX ORDER — LANOLIN ALCOHOL/MO/W.PET/CERES
1000 CREAM (GRAM) TOPICAL DAILY
Qty: 90 TABLET | Refills: 4 | Status: SHIPPED | OUTPATIENT
Start: 2020-03-31 | End: 2020-12-21 | Stop reason: SDUPTHER

## 2020-03-31 RX ORDER — MEMANTINE HYDROCHLORIDE 5 MG/1
TABLET ORAL
Qty: 180 TABLET | Refills: 1 | Status: SHIPPED
Start: 2020-03-31 | End: 2020-07-01

## 2020-03-31 RX ORDER — ACETAMINOPHEN 160 MG
TABLET,DISINTEGRATING ORAL
Qty: 90 CAPSULE | Refills: 4 | Status: SHIPPED | OUTPATIENT
Start: 2020-03-31 | End: 2020-12-21 | Stop reason: SDUPTHER

## 2020-03-31 NOTE — PROGRESS NOTES
Pursuant to the emergency declaration under the [facility], [facility] waiver authority and the [employer] and [employer] Act, this Virtual  Visit was conducted, with patient's consent, to reduce the patient's risk of exposure to COVID-19 and provide continuity of care for an established patient. Services were provided through a video synchronous discussion virtually to substitute for in-person clinic visit. Subjective  Adore Cota, 78 y.o. male presents today with:  No chief complaint on file. Adore Cota is a 78 y.o. male evaluated via telephone on 3/31/2020. Consent:  He and/or health care decision maker is aware that that he may receive a bill for this telephone service, depending on his insurance coverage, and has provided verbal consent to proceed: Yes      Documentation:  I communicated with the patient and/or health care decision maker about diabetes copd, and dementia. Details of this discussion including any medical advice provided: continue current meds as reviewed during today's phone call. CHeck 1C in 3-4 months. Call with questions or concerns. I affirm this is a Patient Initiated Episode with an Established Patient who has not had a related appointment within my department in the past 7 days or scheduled within the next 24 hours. Total Time: 11-20 minutes    Note: not billable if this call serves to triage the patient into an appointment for the relevant concern        HPI        This telephone encounter was conducted between this writer and the above named patient who is at home in lieu of a face-to-face visit in setting of [address] emergency. The patient understands that this encounter will be billed. Patientwishes to proceed. No other questions and or concerns for today's visit    Patient is here for DM f/u.  Sugars have been moderately well-controlled and patient has not been experiencing hyper- or hypoglycemic symptoms. Compliance with meds is fair to good and there are no side effects. Still uses 5-10 units o regular insulin occasionally if he eats more than usual in the evening. Patient exercises occasionally and tries to eat healthy. Has decreased smoking to 2 cigarettes a day. COPD. On LABA/ICS and prn albuterol - less than 3 times a week. No significant cough, mild baseline shortness of breath, occasional wheezing, mild, occasional chest tightness. IReview of Systems  No fevers, chills, sweats. No unintended weight loss. No abdominal pain, nausea, vomiting, diarrhea, constipation, bloody stools, black tarry stools. No rashes. No swollen glands.       Past Medical History:   Diagnosis Date    Adult celiac disease 2013    Cellulitis of right foot 8/28/2017    COPD (chronic obstructive pulmonary disease) (HCC)     Deformity of toe of left foot     Diverticulosis of colon     Hernia, abdominal     Lung disease     Memory loss or impairment 2016    Personal history of testicular cancer     S/P colonoscopy with polypectomy 2013    DUE 2018    Tubular adenoma of colon 2013    Dr Anne Ortiz    Vitamin D deficiency 2014     Past Surgical History:   Procedure Laterality Date    CARPAL TUNNEL RELEASE Right 2013    Dr Tyree Carmichael Left     Dr Stefani Diehl    cancer     Social History     Socioeconomic History    Marital status:      Spouse name: Not on file    Number of children: 7    Years of education: Not on file    Highest education level: Not on file   Occupational History    Occupation: construction, retired   Social Needs    Financial resource strain: Not on file    Food insecurity     Worry: Not on file     Inability: Not on file   [employer] needs     Medical: Not on file     Non-medical: Not on file   Tobacco Use    Smoking status: Current Every Day Smoker     Packs/day: 1.00     Years: 40.00 Pack years: 40.00     Types: Cigarettes     Start date: 8/5/1957    Smokeless tobacco: Never Used    Tobacco comment: tried patches but got rash   Substance and Sexual Activity    Alcohol use: Not on file    Drug use: No    Sexual activity: Not on file   Lifestyle    Physical activity     Days per week: Not on file     Minutes per session: Not on file    Stress: Not on file   Relationships    Social connections     Talks on phone: Not on file     Gets together: Not on file     Attends Restoration service: Not on file     Active member of club or organization: Not on file     Attends meetings of clubs or organizations: Not on file     Relationship status: Not on file    Intimate partner violence     Fear of current or ex partner: Not on file     Emotionally abused: Not on file     Physically abused: Not on file     Forced sexual activity: Not on file   Other Topics Concern    Not on file   Social History Narrative    Born in [place], one of 8    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[address],[address], worked in construction    , 7 children, all live in [address] [place] [address] Road with girlfriend in an aprtment in [address] [place] [address] lad neighbor checks on him     Family History   Family history unknown:  Yes     Allergies   Allergen Reactions    Aricept [Donepezil Hydrochloride] Diarrhea    Exelon [Rivastigmine Tartrate] Rash and Other (See Comments)     burn    Vancomycin Rash     Current Outpatient Medications   Medication Sig Dispense Refill    memantine (NAMENDA) 5 MG tablet TAKE 1 TABLET BY MOUTH TWICE DAILY 180 tablet 1    vitamin B-12 (CYANOCOBALAMIN) 1000 MCG tablet Take 1 tablet by mouth daily 90 tablet 4    Cholecalciferol (VITAMIN D3) 50 MCG (2000 UT) CAPS 1 po daily with meal 90 capsule 4    Multiple Vitamins-Minerals (CENTRUM SILVER 50+MEN) TABS Take by mouth      Naproxen Sodium (ALEVE) 220 MG CAPS Take by mouth      empagliflozin (JARDIANCE) 10 MG tablet Take 1 tablet by mouth daily 90 tablet 4    blood glucose test strips (ONE TOUCH ULTRA TEST) strip TEST THREE TIMES DAILY 300 strip 3    fluticasone-salmeterol (ADVAIR) 250-50 MCG/DOSE AEPB Inhale 1 puff into the lungs every 12 hours 60 each 3    metFORMIN (GLUCOPHAGE) 1000 MG tablet Take 1 tablet by mouth 2 times daily (with meals) 90 tablet 4    SITagliptin (JANUVIA) 100 MG tablet Take 1 tablet by mouth daily 90 tablet 4    fluticasone (FLONASE) 50 MCG/ACT nasal spray 1 spray by Nasal route daily 1 Bottle 3    albuterol sulfate  (90 Base) MCG/ACT inhaler Inhale 2 puffs into the lungs every 6 hours as needed for Wheezing 3 Inhaler 1    vitamin C (ASCORBIC ACID) 500 MG tablet Take 1,000 mg by mouth daily       No current facility-administered medications for this visit. PMH, Surgical Hx, Family Hx, and Social Hxreviewed and updated. Health Maintenance reviewed. Objective    There were no vitals filed for this visit. Physical Exam    Patient appears to be alert and oriented to person, place, and situation and is in no acute distress. Mood appears stable and speech and thought are grossly normal.    Lab Results   Component Value Date    LABA1C 8.9 (H) 10/16/2019    LABA1C 8.3 (H) 07/26/2019    LABA1C 7.6 09/10/2018     Lab Results   Component Value Date    LABMICR 1.30 11/01/2014    CREATININE 0.79 07/26/2019     Lab Results   Component Value Date    ALT 11 07/26/2019    AST 18 07/26/2019     Lab Results   Component Value Date    CHOL 138 09/09/2017    TRIG 80 09/09/2017    HDL 47 09/09/2017    LDLCALC 75 09/09/2017        Assessment & Plan   Visit Diagnoses and Associated Orders     Late onset Alzheimer's disease without behavioral disturbance (HCC)    -  Primary    Stable, fair. Continue Namenda and follow closely. Will need advance planning. memantine (NAMENDA) 5 MG tablet [[id number]]           Chronic obstructive pulmonary disease, unspecified COPD type ([facility] [address]        Stable and well-controlled on current meds         Tobacco abuse        ImProving.   Encouraged continued cessation. 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      Side effects, adverse effects of the medication prescribed today, as well as treatment plan/ rationale and result expectations have been discussed with the patient who expresses understanding and desires to proceed.     Close follow up to evaluate treatment results and for coordination of care. I have reviewed the patient's medical history in detail and updated the computerized patient record. More than 50% of the appointment was spent in counseling, education and care coordination. Please note this report has been partially produced using speech recognition software and may contain mistakes related to that system including errors in grammar, punctuation and spelling as well as words and phrases that may seem inappropriate. If there are questions or concerns, please feel free to contact me to clarify. No orders of the defined types were placed in this encounter.     Orders Placed This Encounter   Medications    memantine (NAMENDA) 5 MG tablet     Sig: TAKE 1 TABLET BY MOUTH TWICE DAILY     Dispense:  180 tablet     Refill:  1    vitamin B-12 (CYANOCOBALAMIN) 1000 MCG tablet     Sig: Take 1 tablet by mouth daily     Dispense:  90 tablet     Refill:  4    Cholecalciferol (VITAMIN D3) 50 MCG ( UT) CAPS     Si po daily with meal     Dispense:  90 capsule     Refill:  4     Medications Discontinued During This Encounter   Medication Reason    memantine (NAMENDA) 5 MG tablet REORDER    vitamin

## 2020-06-09 RX ORDER — ALBUTEROL SULFATE 90 UG/1
2 AEROSOL, METERED RESPIRATORY (INHALATION) EVERY 6 HOURS PRN
Qty: 1 INHALER | Refills: 1 | Status: SHIPPED | OUTPATIENT
Start: 2020-06-09 | End: 2020-09-23 | Stop reason: SDUPTHER

## 2020-06-24 NOTE — TELEPHONE ENCOUNTER
Patient friend Lydia Farrell call today in regarding of Mr Jennifer Vargas medication states that the patient is using the Lantus insulin and need refills looking on patient chart that medication was discontinue by you on 01/31/2020.

## 2020-06-24 NOTE — TELEPHONE ENCOUNTER
Please clarify with patient what medications he is taking. He is not supposed to be taking insulin. He is supposed to be on Januvia, Jardiance and metformin.   If he is checking his sugars, how often and what levels have they been at?

## 2020-07-01 ENCOUNTER — VIRTUAL VISIT (OUTPATIENT)
Dept: FAMILY MEDICINE CLINIC | Age: 80
End: 2020-07-01
Payer: MEDICARE

## 2020-07-01 PROCEDURE — 99442 PR PHYS/QHP TELEPHONE EVALUATION 11-20 MIN: CPT | Performed by: FAMILY MEDICINE

## 2020-07-01 ASSESSMENT — PATIENT HEALTH QUESTIONNAIRE - PHQ9
SUM OF ALL RESPONSES TO PHQ QUESTIONS 1-9: 0
SUM OF ALL RESPONSES TO PHQ9 QUESTIONS 1 & 2: 0
2. FEELING DOWN, DEPRESSED OR HOPELESS: 0
SUM OF ALL RESPONSES TO PHQ QUESTIONS 1-9: 0
1. LITTLE INTEREST OR PLEASURE IN DOING THINGS: 0

## 2020-07-01 NOTE — PROGRESS NOTES
Dread Mistry is a [de-identified] y.o. male evaluated via telephone on 2020. Consent:  He and/or health care decision maker is aware that that he may receive a bill for this telephone service, depending on his insurance coverage, and has provided verbal consent to proceed: Yes      Documentation:  I communicated with the patient and/or health care decision maker about see below. Details of this discussion including any medical advice provided: see below    I affirm this is a Patient Initiated Episode with an Established Patient who has not had a related appointment within my department in the past 7 days or scheduled within the next 24 hours. Total Time: minutes: 11-20 minutes    Note: not billable if this call serves to triage the patient into an appointment for the relevant concern      Tommy MEREDITH     2020    TELEHEALTH EVALUATION -- Audio ([facility])    HPI:    Dread Mistry (:  1940) has requested an audio evaluation for the following concern(s):    Patient is here for DM f/u. Sugars have been moderately well-controlled and patient has not been experiencing hyper- or hypoglycemic symptoms. Compliance with meds is good and there are no side effects. Patient exercises occasionally and tries to eat healthier. Has almost eliminated ripe bananas. COPD. On LABA/ICS irregularly and prn albuterol - less than 3 times a week. No significant cough, mild baseline shortness of breath, occasional wheezing, mild, occasional chest tightness. Allergies. Uses flonase prn and allergies. Dementia. Insurance does not cover Namenda and he has reactions t Aricept and Exelon. Review of Systems No fevers, chills, sweats. No unintended weight loss. No abdominal pain, nausea, vomiting, diarrhea, constipation, bloody stools, black tarry stools. No rashes. No swollen glands. Prior to Visit Medications    Medication Sig Taking?  Authorizing Provider History:   Diagnosis Date    Adult celiac disease 2013    Cellulitis of right foot 8/28/2017    COPD (chronic obstructive pulmonary disease) (HCC)     Deformity of toe of left foot     Diverticulosis of colon     Hernia, abdominal     Lung disease     Memory loss or impairment 2016    Personal history of testicular cancer     S/P colonoscopy with polypectomy 2013    DUE 2018    Tubular adenoma of colon 2013    Dr Evan Clark    Vitamin D deficiency 2014   ,   Past Surgical History:   Procedure Laterality Date    Britney Running Right 2013    Dr Hans Mustafa Left     Dr Marvin Florentino    cancer   ,   Social History     Tobacco Use    Smoking status: Current Every Day Smoker     Packs/day: 1.00     Years: 40.00     Pack years: 40.00     Types: Cigarettes     Start date: 8/5/1957    Smokeless tobacco: Never Used    Tobacco comment: tried patches but got rash   Substance Use Topics    Alcohol use: Not on file    Drug use: No       PHYSICAL EXAMINATION:  [ INSTRUCTIONS:  \"[x]\" Indicates a positive item  \"[]\" Indicates a negative item  -- DELETE ALL ITEMS NOT EXAMINED]  Vital Signs: unavailable    Patient appears to be alert to person, place, situation and is in no acute distress. Respiratory effort appears normal. Mood appears stable and speech and thought are grossly normal.    ASSESSMENT/PLAN:  Holger was seen today for diabetes and dementia. Diagnoses and all orders for this visit:    Essential hypertension  Comments:  Apparently stable and well-controlled on current meds  Orders:  -     Comprehensive Metabolic Panel; Future    Chronic obstructive pulmonary disease, unspecified COPD type ([facility] [address]  Comments:  Stable and well-controlled on current meds which are intermittently used. 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 Aaron Lozano is a [de-identified] y.o. male being evaluated by a Virtual Visit (telephonic visit) encounter to address concerns as mentioned above. A caregiver was present when appropriate. Due to this being a TeleHealth encounter (During QQXOF-48 public health emergency), evaluation of the following organ systems was limited: Vitals/Constitutional/EENT/Resp/CV/GI//MS/Neuro/Skin/Heme-Lymph-Imm. Pursuant to the emergency declaration under the [address] [place] [address] authority and the [employer] and [employer] Act, this Virtual Visit was conducted with patient's (and/or legal guardian's) consent, to reduce the patient's risk of exposure to COVID-19 and provide necessary medical care. The patient (and/or legal guardian) has also been advised to contact this office for worsening conditions or problems, and seek emergency medical treatment and/or call 911 if deemed necessary. Services were provided through a telephonic synchronous discussion virtually to substitute for in-person clinic visit. Patient and provider were located at their individual homes. --Baljit Herndon MD on 7/1/2020 at 3:33 PM    An electronic signature was used to authenticate this note.

## 2020-08-19 ENCOUNTER — VIRTUAL VISIT (OUTPATIENT)
Dept: FAMILY MEDICINE CLINIC | Age: 80
End: 2020-08-19
Payer: COMMERCIAL

## 2020-08-19 PROCEDURE — 4040F PNEUMOC VAC/ADMIN/RCVD: CPT | Performed by: FAMILY MEDICINE

## 2020-08-19 PROCEDURE — G8427 DOCREV CUR MEDS BY ELIG CLIN: HCPCS | Performed by: FAMILY MEDICINE

## 2020-08-19 PROCEDURE — 1123F ACP DISCUSS/DSCN MKR DOCD: CPT | Performed by: FAMILY MEDICINE

## 2020-08-19 PROCEDURE — 99214 OFFICE O/P EST MOD 30 MIN: CPT | Performed by: FAMILY MEDICINE

## 2020-08-19 NOTE — PROGRESS NOTES
appears to be alert and oriented to person, place, time, situation and is in no acute distress. Respiratory effort appears normal. Mood appears stable and speech and thought are grossly normal.    ASSESSMENT/PLAN:  Diagnoses and all orders for this visit:    Moderate protein-calorie malnutrition ([address]  Comments:  New onset. Poor control though stable. Glucerna twice daily. Check labs. Follow-up closely. 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    Tanner Santoro is a [de-identified] y.o. male being evaluated by a Virtual Visit (telephonic visit) encounter to address concerns as mentioned above. A caregiver was present when appropriate. Due to this being a TeleHealth encounter (During GQXZU-79 public health emergency), evaluation of the following organ systems was limited: Vitals/Constitutional/EENT/Resp/CV/GI//MS/Neuro/Skin/Heme-Lymph-Imm.   Pursuant to the emergency declaration under the [facility] waiver authority and the [employer] and [employer] Act, this Virtual Visit was conducted with patient's (and/or legal guardian's) consent, to

## 2020-08-24 DIAGNOSIS — I10 ESSENTIAL HYPERTENSION: Chronic | ICD-10-CM

## 2020-08-24 DIAGNOSIS — E11.3211 CONTROLLED TYPE 2 DIABETES MELLITUS WITH RIGHT EYE AFFECTED BY MILD NONPROLIFERATIVE RETINOPATHY AND MACULAR EDEMA, WITH LONG-TERM CURRENT USE OF INSULIN (HCC): ICD-10-CM

## 2020-08-24 DIAGNOSIS — R63.4 WEIGHT LOSS: ICD-10-CM

## 2020-08-24 DIAGNOSIS — Z79.4 CONTROLLED TYPE 2 DIABETES MELLITUS WITH RIGHT EYE AFFECTED BY MILD NONPROLIFERATIVE RETINOPATHY AND MACULAR EDEMA, WITH LONG-TERM CURRENT USE OF INSULIN (HCC): ICD-10-CM

## 2020-08-24 LAB
ALBUMIN SERPL-MCNC: 4.5 G/DL (ref 3.5–4.6)
ALP BLD-CCNC: 52 U/L (ref 35–104)
ALT SERPL-CCNC: 10 U/L (ref 0–41)
ANION GAP SERPL CALCULATED.3IONS-SCNC: 13 MEQ/L (ref 9–15)
AST SERPL-CCNC: 18 U/L (ref 0–40)
BASOPHILS ABSOLUTE: 0 K/UL (ref 0–0.2)
BASOPHILS RELATIVE PERCENT: 0.4 %
BILIRUB SERPL-MCNC: <0.2 MG/DL (ref 0.2–0.7)
BILIRUBIN URINE: NEGATIVE
BLOOD, URINE: NEGATIVE
BUN BLDV-MCNC: 29 MG/DL (ref 8–23)
CALCIUM SERPL-MCNC: 9.4 MG/DL (ref 8.5–9.9)
CHLORIDE BLD-SCNC: 100 MEQ/L (ref 95–107)
CLARITY: CLEAR
CO2: 27 MEQ/L (ref 20–31)
COLOR: YELLOW
CREAT SERPL-MCNC: 1.04 MG/DL (ref 0.7–1.2)
EOSINOPHILS ABSOLUTE: 0.2 K/UL (ref 0–0.7)
EOSINOPHILS RELATIVE PERCENT: 2.1 %
GFR AFRICAN AMERICAN: >60
GFR NON-AFRICAN AMERICAN: >60
GLOBULIN: 2.8 G/DL (ref 2.3–3.5)
GLUCOSE BLD-MCNC: 181 MG/DL (ref 70–99)
GLUCOSE URINE: >=1000 MG/DL
HBA1C MFR BLD: 8.7 % (ref 4.8–5.9)
HCT VFR BLD CALC: 37.6 % (ref 42–52)
HEMOGLOBIN: 12.3 G/DL (ref 14–18)
KETONES, URINE: NEGATIVE MG/DL
LEUKOCYTE ESTERASE, URINE: NEGATIVE
LYMPHOCYTES ABSOLUTE: 1.2 K/UL (ref 1–4.8)
LYMPHOCYTES RELATIVE PERCENT: 16.8 %
MCH RBC QN AUTO: 31.2 PG (ref 27–31.3)
MCHC RBC AUTO-ENTMCNC: 32.7 % (ref 33–37)
MCV RBC AUTO: 95.4 FL (ref 80–100)
MONOCYTES ABSOLUTE: 0.5 K/UL (ref 0.2–0.8)
MONOCYTES RELATIVE PERCENT: 7.4 %
NEUTROPHILS ABSOLUTE: 5.3 K/UL (ref 1.4–6.5)
NEUTROPHILS RELATIVE PERCENT: 73.3 %
NITRITE, URINE: NEGATIVE
PDW BLD-RTO: 13.9 % (ref 11.5–14.5)
PH UA: 5.5 (ref 5–9)
PLATELET # BLD: 155 K/UL (ref 130–400)
POTASSIUM SERPL-SCNC: 4.8 MEQ/L (ref 3.4–4.9)
PREALBUMIN: 28.1 MG/DL (ref 20–40)
PROTEIN UA: NEGATIVE MG/DL
RBC # BLD: 3.94 M/UL (ref 4.7–6.1)
SODIUM BLD-SCNC: 140 MEQ/L (ref 135–144)
SPECIFIC GRAVITY UA: 1.03 (ref 1–1.03)
TOTAL PROTEIN: 7.3 G/DL (ref 6.3–8)
TSH SERPL DL<=0.05 MIU/L-ACNC: 2.58 UIU/ML (ref 0.44–3.86)
UROBILINOGEN, URINE: 0.2 E.U./DL
WBC # BLD: 7.2 K/UL (ref 4.8–10.8)

## 2020-09-23 RX ORDER — ALBUTEROL SULFATE 90 UG/1
2 AEROSOL, METERED RESPIRATORY (INHALATION) EVERY 6 HOURS PRN
Qty: 1 INHALER | Refills: 5 | Status: SHIPPED | OUTPATIENT
Start: 2020-09-23

## 2020-09-23 RX ORDER — FLUTICASONE PROPIONATE 50 MCG
1 SPRAY, SUSPENSION (ML) NASAL DAILY
Qty: 1 BOTTLE | Refills: 5 | Status: SHIPPED | OUTPATIENT
Start: 2020-09-23 | End: 2020-12-21

## 2020-09-28 NOTE — TELEPHONE ENCOUNTER
Refill needed of the pending med. The quantity did not match the directions. Please review the corrected pending med.

## 2020-10-20 ENCOUNTER — OFFICE VISIT (OUTPATIENT)
Dept: FAMILY MEDICINE CLINIC | Age: 80
End: 2020-10-20
Payer: COMMERCIAL

## 2020-10-20 VITALS
DIASTOLIC BLOOD PRESSURE: 80 MMHG | SYSTOLIC BLOOD PRESSURE: 132 MMHG | HEART RATE: 87 BPM | OXYGEN SATURATION: 98 % | RESPIRATION RATE: 16 BRPM | BODY MASS INDEX: 19.61 KG/M2 | TEMPERATURE: 96.4 F | WEIGHT: 122 LBS | HEIGHT: 66 IN

## 2020-10-20 PROBLEM — M25.511 ACUTE PAIN OF RIGHT SHOULDER: Chronic | Status: ACTIVE | Noted: 2020-10-20

## 2020-10-20 LAB — HBA1C MFR BLD: 8.6 %

## 2020-10-20 PROCEDURE — G8484 FLU IMMUNIZE NO ADMIN: HCPCS | Performed by: FAMILY MEDICINE

## 2020-10-20 PROCEDURE — 1123F ACP DISCUSS/DSCN MKR DOCD: CPT | Performed by: FAMILY MEDICINE

## 2020-10-20 PROCEDURE — 99214 OFFICE O/P EST MOD 30 MIN: CPT | Performed by: FAMILY MEDICINE

## 2020-10-20 PROCEDURE — 83036 HEMOGLOBIN GLYCOSYLATED A1C: CPT | Performed by: FAMILY MEDICINE

## 2020-10-20 PROCEDURE — 4004F PT TOBACCO SCREEN RCVD TLK: CPT | Performed by: FAMILY MEDICINE

## 2020-10-20 PROCEDURE — G8427 DOCREV CUR MEDS BY ELIG CLIN: HCPCS | Performed by: FAMILY MEDICINE

## 2020-10-20 PROCEDURE — 3052F HG A1C>EQUAL 8.0%<EQUAL 9.0%: CPT | Performed by: FAMILY MEDICINE

## 2020-10-20 PROCEDURE — 90694 VACC AIIV4 NO PRSRV 0.5ML IM: CPT | Performed by: FAMILY MEDICINE

## 2020-10-20 PROCEDURE — 4040F PNEUMOC VAC/ADMIN/RCVD: CPT | Performed by: FAMILY MEDICINE

## 2020-10-20 PROCEDURE — G8926 SPIRO NO PERF OR DOC: HCPCS | Performed by: FAMILY MEDICINE

## 2020-10-20 PROCEDURE — G8420 CALC BMI NORM PARAMETERS: HCPCS | Performed by: FAMILY MEDICINE

## 2020-10-20 PROCEDURE — G0008 ADMIN INFLUENZA VIRUS VAC: HCPCS | Performed by: FAMILY MEDICINE

## 2020-10-20 PROCEDURE — 3023F SPIROM DOC REV: CPT | Performed by: FAMILY MEDICINE

## 2020-10-20 RX ORDER — MEMANTINE HYDROCHLORIDE 5 MG/1
TABLET ORAL
COMMUNITY
Start: 2020-09-07 | End: 2020-12-21 | Stop reason: SDUPTHER

## 2020-10-20 NOTE — PATIENT INSTRUCTIONS
Patient Education        Jose Miguel Mitchell los rotadores: Ejercicios  Rotator Cuff: Exercises  Instrucciones de cuidado  Aquí se presentan algunos ejemplos de ejercicios típicos de rehabilitación para tratar bell afección. Empiece cada ejercicio lentamente. Reduzca la intensidad del ejercicio si Olga Bath a tener dolor. Bell médico o fisioterapeuta le dirán cuándo puede comenzar con estos ejercicios y cuáles funcionarán mejor para usted. Cómo hacer los ejercicios  Balanceo en forma de péndulo   No claudia sumi ejercicio si tiene dolor de espalda. 1. Apóyese con el brazo elsa en katina mcgee o en el respaldo de katina silla. Luego, inclínese un poco hacia susan y deje que el brazo adolorido cuelgue directamente hacia abajo. En sumi ejercicio no se usan los músculos del brazo. En cambio, se usan las piernas y las caderas para generar un movimiento que hace que el brazo se balancee libremente. 2. [address] y las piernas para guiar el brazo que se balancea ligeramente hacia adelante y [place] atrás jose un péndulo (o jose katina trompa de elefante). [place] el brazo para formar círculos que comienzan pequeños (aproximadamente del tamaño de un plato de comida). Claudia los círculos algo más grandes cada día, según lo permita el dolor. 3. Claudia sumi ejercicio por 5 minutos, de 5 a 7 veces al día. 4. A medida que tenga menos dolor, trate de inclinarse un poco más cuando claudia sumi ejercicio. [place] aumentará la amplitud de movimiento del hombro. Ejercicio de estiramiento posterior   1. Sostenga el codo del brazo lesionado con la otra mano. 2. Use la mano para tirar suavemente del brazo lesionado hacia arriba y BREST lado opuesto del cuerpo. Sentirá un leve estiramiento en la parte posterior del hombro lesionado. 3. Sosténgalo por lo menos de 15 a 30 segundos. Luego baje lentamente el brazo. 4. Repita de 2 a 4 veces.     Estiramiento por detrás de la espalda   Es posible que bell médico o fisioterapeuta Babara Colace que espere para hacer sumi estiramiento hasta que haya recuperado la mayor parte de bell Keith Ishihara y amplitud de [place]. Puede hacer sumi estiramiento de [place]. Mantenga cualquiera de estos estiramientos al menos de 15 a 30 segundos, y repítalos de 2 a 4 veces. 1. Estiramiento leve: Coloque la mano en el bolsillo trasero y déjela allí para estirar el hombro. 2. Estiramiento moderado: Con la otra mano, sostenga el brazo afectado (con la champion hacia afuera) detrás de la espalda sujetándose la gabe. Tire suavemente del brazo hacia arriba para estirar el hombro. 3. Estiramiento avanzado: Colóquese katina toalla sobre el otro hombro. Coloque la mano del brazo lesionado detrás de la espalda. Ahora, sostenga la parte trasera de la toalla. Con la otra mano, sostenga el extremo anterior de la toalla que se encuentra en la parte delantera del cuerpo. Tire suavemente del extremo anterior de la toalla para que la mano suba aún más por la espalda a fin de estirar el hombro. Estiramiento sobre la rubi   1. Parado a aproximadamente un brazo de distancia, agárrese a katina superficie sólida. Puede usar [employer], la perilla de katina gaye o el respaldo de katina silla [place]. 2. Con las rodillas ligeramente flexionadas, inclínese hacia adelante con los brazos estirados. Baje la parte superior del cuerpo y deje que los hombros se estiren. 3. A medida que los hombros puedan [address], devan vez tenga que ronnie un [address] atrás. 4. Mantenga la posición kamla un mínimo de 15 a 30 segundos. [place] y relájese. Si había dado un paso hacia atrás kamla el estiramiento, dé un paso hacia adelante para que pueda [employer] en la superficie sólida. 5. Repita de 2 a 4 veces. Flexión de hombros (acostado)   Para fabricar katina vara para sumi ejercicio, use un trozo de [place] de PVC o el cami de katina escoba sin la escoba.  Claudia la vara de un tamaño que sea aproximadamente un pie (30 cm) más ancho que los hombros.  1. Acuéstese de espalda y sostenga la vara con ambas sepideh. Las carina deberían estar hacia abajo cuando sostiene la vara. 2. Con los codos rectos, levante lentamente los brazos por encima de la rubi. Levántelos hasta que note un estiramiento en los hombros, la parte superior de la espalda y [place]. 3. Sostenga el estiramiento por 15 a 30 segundos. 4. Repita de 2 a 4 veces. Rotación de hombros (acostado)   Para fabricar katina vara para sumi ejercicio, use un trozo de [place] de PVC o el cami de katina escoba sin la escoba. Claudia la varita de un tamaño que sea aproximadamente un pie (30 cm) más ancho que los hombros.  1. Acuéstese de espalda. Sostenga la vara con ambas sepideh con los codos doblados y las carina [place]. 2. Mantenga los codos cerca del cuerpo y mueva la vara cruzando frente al cuerpo hacia el brazo adolorido. 3. Sostenga la posición de 8 a 12 segundos. 4. Repita de 2 a 4 veces. Escalar la pared (de lado)   Evite cualquier movimiento directamente de lado, y tenga cuidado de no arquear la espalda. Bell brazo debe permanecer a unos 30 grados enfrente de bell costado. 1. Párese con bell costado hacia katina pared de [employer] los dedos apenas puedan tocarla en un ángulo de unos 30 grados hacia la parte frontal del cuerpo. 2. [place] con los dedos del brazo lesionado por la pared, y llegue hicks arriba jose el dolor se lo permita. Trate de no llevar el hombro [employer] oído a medida que [place] el brazo [place]. 3. Mantenga rosa maria posición por al menos 15 a 20 segundos. 4. Camine con los dedos hacia abajo, volviendo a la posición inicial.  5. Repítalo un mínimo de 2 a 4 veces. Trate de llegar cada vez más arriba. Escalar la pared (de frente)   Matias sumi ejercicio de estiramiento, tenga cuidado de no arquear la espalda. 1. Párese de frente a la pared de manera que akhil dedos puedan apenas tocarla.   2. Manteniendo el hombro hacia abajo, camine por la pared con los dedos del (flexionar): Párese frente a katina pared o el elba [facility], a unas 6 pulgadas (15 cm) o menos. Mantenga bell brazo lesionado contra bell cuerpo. Cierre el puño con el pulgar por Jennifer Newer. Luego, empuje suavemente la mano hacia adelante y contra la pared, con alrededor del 25% al 50% de bell fuerza. No deje que bell cuerpo se mueva hacia atrás [place]. Mantenga la posición kamla unos 6 segundos. Relájese kamla unos segundos. Repita de 8 a 12 veces. 2. Servando [place] atrás (extender): Párese con la espalda completamente contra la pared. El brazo debe estar apoyado en la pared, con bell codo doblado en un ángulo de 90 grados (la mano hacia adelante). Empuje el codo suavemente hacia atrás contra la pared, con alrededor del 25% al 50% de bell fuerza. No deje que bell cuerpo se mueva hacia adelante mientras empuja. Mantenga la posición kamla unos 6 segundos. Relájese kamla unos segundos. Repita de 8 a 12 veces. Ejercicio escapular: Lagartijas de pared   Sumi ejercicio se realiza mejor con los dedos girados un poco hacia afuera, en lugar de que apunten [place]. 1. Párese frente a katina pared, a entre 12 y 18 pulgadas (30 a 45 cm) de distancia. 2. Coloque las sepideh en la pared, a la altura de los hombros.  3. Doble los brazos lentamente y acerque la kadie a la pared. Mantenga la espalda y las caderas [address] [facility]. 4. Empuje para regresar a donde empezó. 5. Repita de 8 a 12 veces. 6. Cuando pueda hacer sumi ejercicio en la pared cómodamente, puede probarlo contra katina encimera. Después puede avanzar lentamente a hacerlo en el extremo de un sofá, después en katina silla sólida y finalmente en el suelo. Ejercicio escapular: Retracción   Para sumi ejercicio necesitará material elástico para el ejercicio, jose tubos quirúrgicos o Thera[employer]Band. 1. Coloque la avila alrededor de un objeto sólido a la altura de la cintura. (El poste de katina cama funcionará shan). Cada mano debe sujetar un extremo de la avila.   2. Con los codos a los lados y flexionados en un ángulo de 80 grados, tire de la avila [place] atrás. Noreen omóplatos deberán tratar de juntarse. Ron Gutierrezcy los brazos hacia donde [place]. 3. Repita de 8 a 12 veces. 4. Si tiene katina buena amplitud de [employer] hombros, intente sumi ejercicio con los brazos [facility]. Mantenga los codos en un ángulo de 90 grados. Eleve la avila elástica hasta aproximadamente la altura del hombro. Tire de la avila para tratar de juntar noreen omóplatos. Ron Grajeda los brazos hacia donde [place]. Ejercicio de fortalecimiento del rotador interno   1. Comience por atar un pedazo de material elástico para ejercicios a la perilla de katina gaye. Puede usar tubos quirúrgicos o Thera[employer]Band. 2. Párese o siéntese con el hombro relajado y el codo doblado en ángulo de 90 grados. La parte superior del brazo debe descansar cómodamente sobre el costado. Apriete katina toalla enrollada entre el codo y el cuerpo por comodidad. [place] ayudará a mantener el [place] a bell costado. 3. Sujete un extremo de la avila elástica en la mano del brazo dolorido. 4. Gire el antebrazo lentamente hacia el cuerpo hasta que toque el abdomen. Muévalo lentamente de vuelta hacia donde [place]. 5. Mantenga el codo y la parte superior del brazo apretados firmemente contra la toalla o contra el costado de bell cuerpo. 6. Repita de 8 a 12 veces. Ejercicio de fortalecimiento del rotador externo   1. Comience por atar un pedazo de material elástico para ejercicios a la perilla de katina gaye. Puede utilizar tubos quirúrgicos o Thera-Band. (También puede sujetar un extremo de la avila en cada mano). 2. Párese o siéntese con el hombro relajado y el codo doblado en ángulo de 90 grados. La parte superior del brazo debe descansar cómodamente sobre el costado. Apriete katina toalla enrollada entre el codo y el cuerpo por comodidad. [place] ayudará a mantener el brazo al [employer].   3. Sujete un extremo de la avila elástica con la mano del brazo dolorido. 4. Comience con el antebrazo cruzando el abdomen. Gire el antebrazo lentamente alejándolo del cuerpo. Cindra Rowland codo y la parte superior del brazo apretados contra la toalla o contra el costado del cuerpo hasta que empiece a sentir tensión en el hombro. Mueva el brazo lentamente de vuelta hacia donde Orlene Door. 5. Repita de 8 a 12 veces. La atención de seguimiento es katina parte clave de bell tratamiento y seguridad. Asegúrese de hacer y acudir a todas las citas, y llame a bell médico si está teniendo problemas. También es katina buena idea saber los resultados de akhil exámenes y mantener katina lista de los medicamentos que franny. ¿Dónde puede encontrar más información en inglés? Phuong Mason a [email or web address] e ingrese a bell cuenta de MyChart. Neita Remedies en el [place] Leal \"Search Health Information\" para más información (en inglés) sobre \"Manguito de los rotadores: Ejercicios. \"     Si no tiene katina cuenta, madhu clic en el enlace \"Sign Up Now\". Revisado: 2 marzo, 2020               Versión del contenido: 12.6  © 8142-3702 Healthwise, Incorporated. Las instrucciones de cuidado fueron adaptadas bajo licencia por [facility] ([facility]). Si usted tiene Coosa Calvin afección médica o sobre estas instrucciones, siempre pregunte a bell profesional de nevaeh. Healthwise, Incorporated niega toda garantía o responsabilidad por bell uso de esta información. Patient Education        Rehabilitación del manguito de los rotadores  Rotator Cuff Rehabilitation  ¿Qué es la rehabilitación del manguito de los rotadores? La rehabilitación del manguito de los rotadores consiste en katina serie de ejercicios que usted hace después de la [facility]. Le ayuda a recuperar la amplitud de movimiento y la fuerza del hombro. Usted colaborará con bell médico y un fisioterapeuta para planificar sumi programa de ejercicios.  Para obtener los [place], es necesario hacer los ejercicios correctamente y con la frecuencia que bell médico le indique. Antes de comenzar a hacer cualquier ejercicio, hable con bell médico o fisioterapeuta. Es importante que usted sepa exactamente cómo debe hacer los ejercicios. Interrúmpalos y llame a bell médico si no está seguro de que está haciendo los ejercicios correctamente o si tiene dolor. Escuchar crujidos y chasquidos kamla el ejercicio no siempre es causa de preocupación, nahum la sensación de chirrido puede significar un problema más grave. Aplíquese hielo después de hacer ejercicio si le duele el hombro. La atención de seguimiento es katina parte clave de bell tratamiento y seguridad. Asegúrese de hacer y acudir a todas las citas, y llame a bell médico si está teniendo problemas. También es katina buena idea saber los resultados de akhil exámenes y mantener katina lista de los medicamentos que franny. Ejercicios de estiramiento  No empiece a hacer ejercicios de estiramiento hasta que bell médico le diga que puede [place]. Bell médico le dirá qué ejercicios hacer, cada cuánto y por cuánto tiempo debe hacerlos. Estiramiento posterior   · Párese derecho con los pies separados por el ancho de los hombros.  · Ponga la mano del brazo afectado en el hombro opuesto y [place] codo contra el cuerpo. · Luego, con el brazo elsa, sostenga el codo del brazo afectado y [place] suavemente [place] arriba, lejos del cuerpo y de lado a lado. Rotación externa   · Sostenga katina varilla o un cami liviano con el brazo elsa. Debe ser aproximadamente de 2 pies (60 centímetros) de ester. Mertie Nails de madan puede servir. · Acuéstese boca arriba con los codos a los costados. Apoye el codo del brazo afectado en un cojín o katina toalla doblada. · Ponga los brazos de [employer] codos formen un ángulo de 90 grados, jose la letra \"L\". Las sepideh deben apuntar [place]. · [address] .  Use el brazo elsa para empujar el cami hacia el brazo afectado para que sumi se mueva hacia afuera, lejos del cuerpo. Pare cuando sienta que el brazo se estira. Ejercicios de fortalecimiento  No empiece a hacer ejercicios de fortalecimiento hasta que bell médico le diga que puede [place]. [place] suele ser al menos de 6 a 8 semanas después de la [facility]. Bell médico le dirá cada cuánto y por cuánto [employer] ejercicios. Levantamiento del brazo a un costado  · [employer] derecho con los pies separados por el ancho de los hombros y el brazo afectado a un costado. · Levante lentamente el brazo lesionado hacia un lado, con el pulgar apuntando [place] arriba. Levante el brazo 60 grados jose christian (al nivel del hombro está a 90 grados). · Tras mantener la posición de 3 a 5 segundos, baje el brazo de nuevo a bell lado. Si es necesario, cruce el brazo \"crawley\" por susan del cuerpo y colóquelo bajo el codo a medida que baja el brazo lesionado. Use el brazo elsa para impedir que el brazo lesionado se caiga muy rápido kamla el movimiento [facility]. · Repítalo de 8 a 12 veces. · Cuando empiece, no sostenga ningún peso [employer]. A medida que tenga más fuerza, puede usar pesas ([place]) de 1 a 2 libras o katina pequeña sarah de alimentos. Flexión del hombro  · Póngase de pie frente a katina pared. Bell cuerpo debe estar a unas 6 pulgadas (15 cm) de la pared. · Mantenga el Genevia Eileen y el codo afectados al costado y doble el codo de manera que el brazo apunte hacia la pared. · Cierre el puño con el pulgar [place]. · Empuje la mano contra la pared y mantenga la posición por 6 segundos. Empuje con 25% a 50% de la fuerza que tenga. Extensión del hombro  · Póngase de pie con la espalda plana contra la pared. · Mantenga el brazo y el codo afectados al costado y doble el codo de manera que la parte superior del brazo quede contra la pared y la parte inferior del brazo apunte hacia adelante. Cierre el puño con el pulgar [place]. · Empuje el codo suavemente contra la pared y [facility] posición por 6 segundos.  Empuje con 25% a 50% de la fuerza que tenga. ¿Dónde puede encontrar más información en inglés? Nancy Izaguirre a [email or web address] e ingrese a truong cuenta de Amanda. García Lux [id number] en el Meg Began \"Search Health Information\" para más información (en inglés) sobre \"Rehabilitación del manguito de los rotadores. \"     Si no tiene katina cuenta, madhu susan en el enlace \"Sign Up Now\". Revisado: 2 marzo, 2020               Versión del contenido: 12.6  © 1873-6569 Healthwise, Incorporated. Las instrucciones de cuidado fueron adaptadas bajo licencia por [facility] ([facility]). Si usted tiene Isanti Clover afección médica o sobre estas instrucciones, siempre pregunte a truong profesional de nevaeh. Healthwise, Incorporated niega toda garantía o responsabilidad por truong uso de esta información. Patient Education        Hombro congelado: Ejercicios  Frozen Shoulder: Exercises  Instrucciones de cuidado  Estos son algunos ejemplos de ejercicios típicos de rehabilitación para truong afección. Comience cada ejercicio lentamente. Reduzca la intensidad del ejercicio si Laura Yepez a sentir dolor. Truong médico o el fisioterapeuta le dirán cuándo puede comenzar con estos ejercicios y cuáles funcionarán mejor para usted. Cómo se hacen los ejercicios  Estiramientos del kaykay   1. Cassie directamente al frente e incline la rubi hacia el hombro derecho. No deje que el hombro theresa suba mientras inclina la rubi hacia la derecha. 2. Mantenga la posición entre 15 y 27 segundos. 3. Incline la rubi hacia la izquierda. No deje que el hombro derecho suba mientras inclina la rubi hacia la izquierda. 4. Mantenga la posición entre 15 y 27 segundos. 5. Repita de 2 a 4 veces hacia cada lado. Rotación de hombros   1. Siéntese cómodamente y separe los pies a la distancia de los hombros. También puede hacer sumi ejercicio estando de pie.   2. Martorell los hombros [place], Garcia Elizabeth atrás y después hacia abajo en un [facility] y circular. 3. Repita de 2 a 4 veces. Flexión del hombro (acostado)   1. Acuéstese boca arriba, sosteniendo la vara con las sepideh. Las carina deben estar hacia abajo mientras sostiene la vara. Separe las sepideh ligeramente más que el ancho de akhil hombros.  2. Con los codos estirados, levante lentamente los brazos sobre la rubi hasta que sienta un estiramiento en los hombros, la parte superior de la espalda y [place]. 3. Mantenga la posición entre 15 y 27 segundos. 4. Repita de 2 a 4 veces. Rotación del hombro (acostado)   1. Acuéstese boca arriba y sostenga la vara con ambas sepideh, con los codos flexionados y las carina [place]. 2. Con los codos cerca del cuerpo, mueva la vara horizontalmente hacia el brazo que tiene el dolor. 3. Mantenga la posición entre 15 y 27 segundos. 4. Repita de 2 a 4 veces. Rotación interna del hombro con katina toalla   1. Enrolle katina toalla a lo ester. Sostenga la toalla por encima y por detrás de la rubi con el brazo que no está adolorido. 2. Con el brazo adolorido, agarre la toalla por detrás de la espalda. 3. Con el brazo que está encima de la [place], jale la toalla hacia arriba hasta sentir un estiramiento en la parte frontal externa del hombro adolorido. 4. Mantenga la posición entre 15 y 27 segundos. 5. Relájese y regrese la toalla a la posición inicial.  6. Repita de 2 a 4 veces. Compresión de omóplatos   1. Estando de pie con los brazos a los lados, Joshua Tellez No levante los hombros mientras está comprimiendo los omóplatos. 2. Mantenga la posición kamla 6 segundos. 3. Repita de 8 a 12 veces. La atención de seguimiento es katina parte clave de bell tratamiento y seguridad. Asegúrese de hacer y acudir a todas las citas, y llame a bell médico si está teniendo problemas. También es katina buena idea saber los resultados de akhil exámenes y mantener katina lista de los medicamentos que franny. ¿Dónde puede encontrar más información en inglés?   Nancy Izaguirre a [email or web address] e ingrese a bell cuenta de MyChart. Flora Je R144 en el Margart Free \"Search Health Information\" para más información (en inglés) sobre \"Hombro congelado: Ejercicios. \"     Si no tiene katina cuenta, madhu susan en el enlace \"Sign Up Now\". Revisado: 2 marzo, 2020               Versión del contenido: 12.6  © 1392-9299 Healthwise, Incorporated. Las instrucciones de cuidado fueron adaptadas bajo licencia por [facility] ([facility]). Si usted tiene Woodruff Toano afección médica o sobre estas instrucciones, siempre pregunte a bell profesional de nevaeh. Healthwise, Incorporated niega toda garantía o responsabilidad por bell uso de esta información.

## 2020-10-20 NOTE — PROGRESS NOTES
Subjective  Nelson Durie, [de-identified] y.o. male presents today with:  Chief Complaint   Patient presents with    Diabetes     2 month follow up for DM, HTN. HPI    Patient is here for DM f/u. Sugars have been moderately well-controlled and patient has not been experiencing hyper- or hypoglycemic symptoms. Compliance with meds is good and there are no side effects. Patient exercises occasionally and tries to eat healthy. Is up to date on foot and eye exams and immunizations. Patient is here for f/u HTN. Is compliant with meds and has no side effects from them. Avoids added salt. Tries to eat healthy. Exercises occasionally. Has no chest pain, shortness of breath, palpitations or edema. Dementia - On Namenda. Accompanied by a friend. No reports of memory issues causing difficulty. Eats weell. Remembers to bathe. Maintains clean home and clean clothes. Mr. Ruth Luna says that he fell a couple of months ago and since then his right shoulder has been painful. He has essentially normal range of motion notes that he is unable to internally rotate and flex shoulder fully. He states he fell with his left leg fell asleep. No other questions and or concerns for today's visit      Review of Systems  No fevers, chills, sweats. No unintended weight loss. No abdominal pain, nausea, vomiting, diarrhea, constipation, bloody stools, black tarry stools. No rashes. No swollen glands.       Past Medical History:   Diagnosis Date    Acute pain of right shoulder 10/20/2020    Adult celiac disease 2013    Cellulitis of right foot 8/28/2017    COPD (chronic obstructive pulmonary disease) (HCC)     Deformity of toe of left foot     Diverticulosis of colon     Hernia, abdominal     Lung disease     Memory loss or impairment 2016    Personal history of testicular cancer     S/P colonoscopy with polypectomy 2013    DUE 2018    Tubular adenoma of colon 2013    Dr Ruben Walter    Vitamin D deficiency 2014 Past Surgical History:   Procedure Laterality Date    CARPAL TUNNEL RELEASE Right 2013    Dr Lidia House Left     Dr Dale Wills History     Socioeconomic History    Marital status:      Spouse name: Not on file    Number of children: 7    Years of education: Not on file    Highest education level: Not on file   Occupational History    Occupation: construction, retired   Social Needs    Financial resource strain: Not on file    Food insecurity     Worry: Not on file     Inability: Not on file   [employer] needs     Medical: Not on file     Non-medical: Not on file   Tobacco Use    Smoking status: Current Every Day Smoker     Packs/day: 1.00     Years: 40.00     Pack years: 40.00     Types: Cigarettes     Start date: 8/5/1957    Smokeless tobacco: Never Used    Tobacco comment: tried patches but got rash   Substance and Sexual Activity    Alcohol use: Not on file    Drug use: No    Sexual activity: Not on file   Lifestyle    Physical activity     Days per week: Not on file     Minutes per session: Not on file    Stress: Not on file   Relationships    Social connections     Talks on phone: Not on file     Gets together: Not on file     Attends [employer] service: Not on file     Active member of club or organization: Not on file     Attends meetings of clubs or organizations: Not on file     Relationship status: Not on file    Intimate partner violence     Fear of current or ex partner: Not on file     Emotionally abused: Not on file     Physically abused: Not on file     Forced sexual activity: Not on file   Other Topics Concern    Not on file   Social History Narrative    Born in [place], one of 8    Came to the [address] [place] [address],[address], worked in construction    , 7 children, all live in [address] [place] [address] Road with girlfriend in an aprtment in [address] [place] [address] lad neighbor checks on him     Family History   Family history unknown:  Yes Allergies   Allergen Reactions    Aricept [Donepezil Hydrochloride] Diarrhea    Exelon [Rivastigmine Tartrate] Rash and Other (See Comments)     burn    Vancomycin Rash     Current Outpatient Medications   Medication Sig Dispense Refill    memantine (NAMENDA) 5 MG tablet TAKE 1 TABLET BY MOUTH TWICE DAILY      metFORMIN (GLUCOPHAGE) 1000 MG tablet TAKE 1 TABLET BY MOUTH TWICE DAILY WITH MEALS 180 tablet 4    fluticasone (FLONASE) 50 MCG/ACT nasal spray 1 spray by Nasal route daily 1 Bottle 5    albuterol sulfate  (90 Base) MCG/ACT inhaler Inhale 2 puffs into the lungs every 6 hours as needed for Wheezing 1 Inhaler 5    Nutritional Supplements (GLUCERNA ADVANCE SHAKE) LIQD Take 1 Units by mouth 2 times daily 60 Bottle 5    vitamin B-12 (CYANOCOBALAMIN) 1000 MCG tablet Take 1 tablet by mouth daily 90 tablet 4    Cholecalciferol (VITAMIN D3) 50 MCG (2000 UT) CAPS 1 po daily with meal 90 capsule 4    Multiple Vitamins-Minerals (CENTRUM SILVER 50+MEN) TABS Take by mouth      Naproxen Sodium (ALEVE) 220 MG CAPS Take by mouth      empagliflozin (JARDIANCE) 10 MG tablet Take 1 tablet by mouth daily 90 tablet 4    blood glucose test strips (ONE TOUCH ULTRA TEST) strip TEST THREE TIMES DAILY 300 strip 3    vitamin C (ASCORBIC ACID) 500 MG tablet Take 1,000 mg by mouth daily       No current facility-administered medications for this visit. PMH, Surgical Hx, Family Hx, and Social Hxreviewed and updated. Health Maintenance reviewed. Objective    Vitals:    10/20/20 1250 10/20/20 1256   BP: (!) 152/90 132/80   Site: Left Upper Arm Right Upper Arm   Position: Sitting Sitting   Cuff Size: Medium Adult Medium Adult   Pulse: 87    Resp: 16    Temp: 96.4 °F (35.8 °C)    TempSrc: Temporal    SpO2: 98%    Weight: 122 lb (55.3 kg)    Height: 5' 6\" (1.676 m)         Physical Exam  Constitutional:       Appearance: He is well-developed. HENT:      Head: Normocephalic and atraumatic.    Eyes: General: No scleral icterus. Conjunctiva/sclera: Conjunctivae normal.   Neck:      Musculoskeletal: Normal range of motion and neck supple. Thyroid: No thyromegaly. Vascular: No carotid bruit. Cardiovascular:      Rate and Rhythm: Normal rate and regular rhythm. Heart sounds: Normal heart sounds, S1 normal and S2 normal.   Pulmonary:      Effort: Pulmonary effort is normal.      Breath sounds: Normal breath sounds. No wheezing or rales. Abdominal:      General: Bowel sounds are normal. There is no distension. Palpations: Abdomen is soft. There is no mass. Tenderness: There is no abdominal tenderness. There is no guarding or rebound. Musculoskeletal:      Right lower leg: No edema. Left lower leg: No edema. Skin:     General: Skin is warm and dry. Neurological:      Mental Status: He is alert and oriented to person, place, and time. Psychiatric:         Mood and Affect: Mood normal.         Behavior: Behavior normal.         Thought Content: Thought content normal.         Judgment: Judgment normal.           Lab Results   Component Value Date    LABA1C 8.7 (H) 08/24/2020    LABA1C 8.9 (H) 10/16/2019    LABA1C 8.3 (H) 07/26/2019     Lab Results   Component Value Date    LABMICR 1.30 11/01/2014    CREATININE 1.04 08/24/2020     Lab Results   Component Value Date    ALT 10 08/24/2020    AST 18 08/24/2020     Lab Results   Component Value Date    CHOL 138 09/09/2017    TRIG 80 09/09/2017    HDL 47 09/09/2017    LDLCALC 75 09/09/2017        Assessment & Plan   Visit Diagnoses and Associated Orders     Essential hypertension    -  Primary    Stable and well-controlled with no medications.          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 Advised Tylenol as needed per package instructions. Also provided self-directed physical therapy exercises. If persists con         Need for influenza vaccination        INFLUENZA, QUADV, ADJUVANTED, 65 YRS =, IM, PF, PREFILL SYR, 0.5ML (FLUAD) [40353 Custom]           ORDERS WITHOUT AN ASSOCIATED DIAGNOSIS    memantine (NAMENDA) 5 MG tablet [27379]              Reviewed with the patient: all disease processes, current clinical status, medications, activities and diet.      Side effects, adverse effects of the medication prescribed today, as well as treatment plan/ rationale and result expectations have been discussed with the patient who expresses understanding and desires to proceed.     Close follow up to evaluate treatment results and for coordination of care. I have reviewed the patient's medical history in detail and updated the computerized patient record. More than 50% of the appointment was spent in face-to-face counseling, education and care coordination. Please note this report has been partially produced using speech recognition software and may contain mistakes related to that system including errors in grammar, punctuation and spelling as well as words and phrases that may seem inappropriate. If there are questions or concerns, please feel free to contact me to clarify. Orders Placed This Encounter   Procedures    INFLUENZA, QUADV, ADJUVANTED, 65 YRS =, IM, PF, PREFILL SYR, 0.5ML (FLUAD)    POCT glycosylated hemoglobin (Hb A1C)     No orders of the defined types were placed in this encounter.     Medications Discontinued During This Encounter   Medication Reason    fluticasone-salmeterol (ADVAIR) 250-50 MCG/DOSE AEPB Therapy completed    SITagliptin (JANUVIA) 100 MG tablet Therapy completed     Return in about 2 months (around 12/20/2020) for DM, HLD vv. Controlled Substance Monitoring:    Acute and Chronic Pain Monitoring:   No flowsheet data found.         Alvarez Lucas MD

## 2020-12-14 ENCOUNTER — APPOINTMENT (OUTPATIENT)
Dept: ULTRASOUND IMAGING | Age: 80
End: 2020-12-14
Payer: COMMERCIAL

## 2020-12-14 ENCOUNTER — HOSPITAL ENCOUNTER (EMERGENCY)
Age: 80
Discharge: HOME | End: 2020-12-14
Attending: EMERGENCY MEDICINE
Payer: COMMERCIAL

## 2020-12-14 VITALS
RESPIRATION RATE: 20 BRPM | BODY MASS INDEX: 17.73 KG/M2 | HEART RATE: 71 BPM | TEMPERATURE: 97.6 F | OXYGEN SATURATION: 100 % | SYSTOLIC BLOOD PRESSURE: 134 MMHG | DIASTOLIC BLOOD PRESSURE: 66 MMHG | WEIGHT: 117 LBS | HEIGHT: 68 IN

## 2020-12-14 LAB
ALBUMIN SERPL-MCNC: 4.6 G/DL (ref 3.5–4.6)
ALP BLD-CCNC: 54 U/L (ref 35–104)
ALT SERPL-CCNC: 7 U/L (ref 0–41)
ANION GAP SERPL CALCULATED.3IONS-SCNC: 11 MEQ/L (ref 9–15)
APTT: 31.7 SEC (ref 24.4–36.8)
AST SERPL-CCNC: 17 U/L (ref 0–40)
BASOPHILS ABSOLUTE: 0 K/UL (ref 0–0.2)
BASOPHILS RELATIVE PERCENT: 0.5 %
BILIRUB SERPL-MCNC: <0.2 MG/DL (ref 0.2–0.7)
BUN BLDV-MCNC: 29 MG/DL (ref 8–23)
CALCIUM SERPL-MCNC: 9.2 MG/DL (ref 8.5–9.9)
CHLORIDE BLD-SCNC: 97 MEQ/L (ref 95–107)
CO2: 27 MEQ/L (ref 20–31)
CREAT SERPL-MCNC: 1.53 MG/DL (ref 0.7–1.2)
D DIMER: 1.37 MG/L FEU (ref 0–0.5)
EOSINOPHILS ABSOLUTE: 0.1 K/UL (ref 0–0.7)
EOSINOPHILS RELATIVE PERCENT: 1.8 %
GFR AFRICAN AMERICAN: 53.2
GFR NON-AFRICAN AMERICAN: 43.9
GLOBULIN: 2.2 G/DL (ref 2.3–3.5)
GLUCOSE BLD-MCNC: 120 MG/DL (ref 70–99)
HCT VFR BLD CALC: 32.1 % (ref 42–52)
HEMOGLOBIN: 10.8 G/DL (ref 14–18)
INR BLD: 0.9
LYMPHOCYTES ABSOLUTE: 0.8 K/UL (ref 1–4.8)
LYMPHOCYTES RELATIVE PERCENT: 13.7 %
MCH RBC QN AUTO: 31.6 PG (ref 27–31.3)
MCHC RBC AUTO-ENTMCNC: 33.6 % (ref 33–37)
MCV RBC AUTO: 94 FL (ref 80–100)
MONOCYTES ABSOLUTE: 0.5 K/UL (ref 0.2–0.8)
MONOCYTES RELATIVE PERCENT: 9.7 %
NEUTROPHILS ABSOLUTE: 4.2 K/UL (ref 1.4–6.5)
NEUTROPHILS RELATIVE PERCENT: 74.3 %
PDW BLD-RTO: 13.6 % (ref 11.5–14.5)
PLATELET # BLD: 129 K/UL (ref 130–400)
POTASSIUM REFLEX MAGNESIUM: 4.6 MEQ/L (ref 3.4–4.9)
PROTHROMBIN TIME: 12.7 SEC (ref 12.3–14.9)
RBC # BLD: 3.41 M/UL (ref 4.7–6.1)
SODIUM BLD-SCNC: 135 MEQ/L (ref 135–144)
TOTAL PROTEIN: 6.8 G/DL (ref 6.3–8)
WBC # BLD: 5.6 K/UL (ref 4.8–10.8)

## 2020-12-14 PROCEDURE — 93971 EXTREMITY STUDY: CPT

## 2020-12-14 PROCEDURE — 36415 COLL VENOUS BLD VENIPUNCTURE: CPT

## 2020-12-14 PROCEDURE — 99284 EMERGENCY DEPT VISIT MOD MDM: CPT

## 2020-12-14 PROCEDURE — 85610 PROTHROMBIN TIME: CPT

## 2020-12-14 PROCEDURE — 80053 COMPREHEN METABOLIC PANEL: CPT

## 2020-12-14 PROCEDURE — 85025 COMPLETE CBC W/AUTO DIFF WBC: CPT

## 2020-12-14 PROCEDURE — 85730 THROMBOPLASTIN TIME PARTIAL: CPT

## 2020-12-14 PROCEDURE — 85379 FIBRIN DEGRADATION QUANT: CPT

## 2020-12-14 RX ORDER — CLINDAMYCIN HYDROCHLORIDE 300 MG/1
300 CAPSULE ORAL 4 TIMES DAILY
Qty: 40 CAPSULE | Refills: 0 | Status: SHIPPED | OUTPATIENT
Start: 2020-12-14 | End: 2020-12-24

## 2020-12-14 RX ORDER — METHYLPREDNISOLONE 4 MG/1
TABLET ORAL
Qty: 1 KIT | Refills: 0 | Status: SHIPPED | OUTPATIENT
Start: 2020-12-14 | End: 2020-12-20

## 2020-12-14 ASSESSMENT — PAIN DESCRIPTION - ORIENTATION: ORIENTATION: RIGHT

## 2020-12-14 ASSESSMENT — PAIN SCALES - GENERAL: PAINLEVEL_OUTOF10: 6

## 2020-12-14 ASSESSMENT — PAIN DESCRIPTION - LOCATION: LOCATION: LEG

## 2020-12-14 ASSESSMENT — PAIN DESCRIPTION - FREQUENCY: FREQUENCY: CONTINUOUS

## 2020-12-14 ASSESSMENT — PAIN DESCRIPTION - DESCRIPTORS: DESCRIPTORS: ACHING

## 2020-12-14 ASSESSMENT — PAIN DESCRIPTION - PAIN TYPE: TYPE: ACUTE PAIN

## 2020-12-14 NOTE — ED TRIAGE NOTES
Pt c/o RLE edema and pain for the past week, denies trauma, Pt is A&OX3, calm, ambulatory, afebrile, breathes are equal and unlabored, 1+ pitting edema in RLE no redness noted.

## 2020-12-14 NOTE — ED PROVIDER NOTES
HPI:  12/14/20, Time: 1:14 PM CEM Milan is a [de-identified] y.o. male presenting to the ED for right lower extremity swelling, beginning 1 week ago. The complaint has been persistent, mild in severity, and worsened by nothing. No chest pain no shortness of breath no fever no chills. He has noticed some edema of the right lower extremity as well as some erythema of the right foot. No cyanosis. No pain in the foot    ROS:   Pertinent positives and negatives are stated within HPI, all other systems reviewed and are negative.  --------------------------------------------- PAST HISTORY ---------------------------------------------  Past Medical History:  has a past medical history of Acute pain of right shoulder, Adult celiac disease, Cellulitis of right foot, COPD (chronic obstructive pulmonary disease) ([facility] [address] Deformity of toe of left foot, Diverticulosis of colon, Hernia, abdominal, Lung disease, Memory loss or impairment, Personal history of testicular cancer, S/P colonoscopy with polypectomy, Tubular adenoma of colon, and Vitamin D deficiency. Past Surgical History:  has a past surgical history that includes Testicle surgery (1990); Inguinal hernia repair (Left); and Carpal tunnel release (Right, 2013). Social History:  reports that he has been smoking cigarettes. He started smoking about 63 years ago. He has a 40.00 pack-year smoking history. He has never used smokeless tobacco. He reports that he does not use drugs. Family History: Family history is unknown by patient. The patients home medications have been reviewed.     Allergies: Aricept [donepezil hydrochloride], Exelon [rivastigmine tartrate], and Vancomycin    ---------------------------------------------------PHYSICAL EXAM--------------------------------------     Constitutional/General: Alert and oriented x3, well appearing, non toxic in NAD  Head: Normocephalic and atraumatic  Eyes: PERRL, EOMI Mouth: Oropharynx clear, handling secretions, no trismus  Neck: Supple, full ROM, non tender to palpation in the midline, no stridor, no crepitus, no meningeal signs  Pulmonary: Lungs clear to auscultation bilaterally, no wheezes, rales, or rhonchi. Not in respiratory distress  Cardiovascular:  Regular rate. Regular rhythm. No murmurs, gallops, or rubs. 2+ distal pulses  Chest: no chest wall tenderness  Abdomen: Soft. Non tender. Non distended. +BS. No rebound, guarding, or rigidity. No pulsatile masses appreciated. Musculoskeletal: Moves all extremities x 4. Warm and well perfused, no clubbing, cyanosis, or edema. Capillary refill <3 seconds  Skin: warm and dry. There is mild erythema of the right foot dorsally no tenderness. There is a trace amount of edema and the dorsum of the foot and the right calf neurovascular exam is intact throughout  Neurologic: GCS 15, CN 2-12 grossly intact, no focal deficits, symmetric strength 5/5 in the upper and lower extremities bilaterally  Psych: Normal Affect    -------------------------------------------------- RESULTS -------------------------------------------------  I have personally reviewed all laboratory and imaging results for this patient. Results are listed below. LABS:  No results found for this visit on 12/14/20. RADIOLOGY:  Interpreted by Radiologist.  US DUP LOWER EXTREMITY RIGHT HAILEY    (Results Pending)             ------------------------- NURSING NOTES AND VITALS REVIEWED ---------------------------   The nursing notes within the ED encounter and vital signs as below have been reviewed by myself. /66   Pulse 111   Temp 97.6 °F (36.4 °C) (Oral)   Resp 16   Ht 5' 8\" (1.727 m)   Wt 117 lb (53.1 kg)   SpO2 96%   BMI 17.79 kg/m²   Oxygen Saturation Interpretation: Normal    The patients available past medical records and past encounters were reviewed. ------------------------------ ED COURSE/MEDICAL DECISION MAKING----------------------  Medications - No data to display          Medical Decision Making:    Repeat heart rate is 82 bpm.  I have ordered a duplex ultrasound of the right lower extremity and some blood work  Lab work is unremarkable ultrasound is normal. He was discharged home on clindamycin and a Medrol Dosepak          This patient's ED course included: a personal history and physicial eaxmination    This patient has remained hemodynamically stable and been closely monitored during their ED course. Counseling: The emergency provider has spoken with the patient and discussed todays results, in addition to providing specific details for the plan of care and counseling regarding the diagnosis and prognosis. Questions are answered at this time and they are agreeable with the plan.       --------------------------------- IMPRESSION AND DISPOSITION ---------------------------------    IMPRESSION  1. Leg swelling    2. Leg edema        DISPOSITION  Disposition: Discharge to home  Patient condition is good        NOTE: This report was transcribed using voice recognition software.  Every effort was made to ensure accuracy; however, inadvertent computerized transcription errors may be present          Tracy Michelle MD  12/14/20 2157

## 2020-12-21 ENCOUNTER — OFFICE VISIT (OUTPATIENT)
Dept: FAMILY MEDICINE CLINIC | Age: 80
End: 2020-12-21
Payer: COMMERCIAL

## 2020-12-21 ENCOUNTER — VIRTUAL VISIT (OUTPATIENT)
Dept: FAMILY MEDICINE CLINIC | Age: 80
End: 2020-12-21
Payer: COMMERCIAL

## 2020-12-21 PROCEDURE — G8484 FLU IMMUNIZE NO ADMIN: HCPCS | Performed by: FAMILY MEDICINE

## 2020-12-21 PROCEDURE — G8427 DOCREV CUR MEDS BY ELIG CLIN: HCPCS | Performed by: FAMILY MEDICINE

## 2020-12-21 PROCEDURE — 3052F HG A1C>EQUAL 8.0%<EQUAL 9.0%: CPT | Performed by: FAMILY MEDICINE

## 2020-12-21 PROCEDURE — 4040F PNEUMOC VAC/ADMIN/RCVD: CPT | Performed by: FAMILY MEDICINE

## 2020-12-21 PROCEDURE — 1123F ACP DISCUSS/DSCN MKR DOCD: CPT | Performed by: FAMILY MEDICINE

## 2020-12-21 PROCEDURE — G0439 PPPS, SUBSEQ VISIT: HCPCS | Performed by: FAMILY MEDICINE

## 2020-12-21 PROCEDURE — 99214 OFFICE O/P EST MOD 30 MIN: CPT | Performed by: FAMILY MEDICINE

## 2020-12-21 RX ORDER — MEMANTINE HYDROCHLORIDE 5 MG/1
TABLET ORAL
Qty: 180 TABLET | Refills: 4 | Status: SHIPPED | OUTPATIENT
Start: 2020-12-21 | End: 2021-03-14

## 2020-12-21 RX ORDER — LANOLIN ALCOHOL/MO/W.PET/CERES
1000 CREAM (GRAM) TOPICAL DAILY
Qty: 90 TABLET | Refills: 4 | Status: SHIPPED | OUTPATIENT
Start: 2020-12-21

## 2020-12-21 RX ORDER — EMPAGLIFLOZIN 10 MG/1
10 TABLET, FILM COATED ORAL DAILY
Qty: 90 TABLET | Refills: 4 | Status: SHIPPED | OUTPATIENT
Start: 2020-12-21

## 2020-12-21 RX ORDER — ACETAMINOPHEN 160 MG
TABLET,DISINTEGRATING ORAL
Qty: 90 CAPSULE | Refills: 4 | Status: SHIPPED | OUTPATIENT
Start: 2020-12-21

## 2020-12-21 ASSESSMENT — LIFESTYLE VARIABLES
HOW OFTEN DO YOU HAVE A DRINK CONTAINING ALCOHOL: 0
HOW OFTEN DO YOU HAVE A DRINK CONTAINING ALCOHOL: NEVER
AUDIT-C TOTAL SCORE: INCOMPLETE
AUDIT TOTAL SCORE: INCOMPLETE

## 2020-12-21 ASSESSMENT — PATIENT HEALTH QUESTIONNAIRE - PHQ9
SUM OF ALL RESPONSES TO PHQ QUESTIONS 1-9: 0
1. LITTLE INTEREST OR PLEASURE IN DOING THINGS: 0
2. FEELING DOWN, DEPRESSED OR HOPELESS: 0
SUM OF ALL RESPONSES TO PHQ QUESTIONS 1-9: 0
SUM OF ALL RESPONSES TO PHQ9 QUESTIONS 1 & 2: 0
SUM OF ALL RESPONSES TO PHQ QUESTIONS 1-9: 0

## 2020-12-21 NOTE — PROGRESS NOTES
memantine (NAMENDA) 5 MG tablet TAKE 1 TABLET BY MOUTH TWICE DAILY Yes Chelsey Shah MD   metFORMIN (GLUCOPHAGE) 1000 MG tablet TAKE 1 TABLET BY MOUTH TWICE DAILY WITH MEALS Yes Chelsey Shah MD   vitamin B-12 (CYANOCOBALAMIN) 1000 MCG tablet Take 1 tablet by mouth daily Yes Chelsey Shah MD   Cholecalciferol (VITAMIN D3) 50 MCG (2000 UT) CAPS 1 po daily with meal Yes Chelsey Shah MD   empagliflozin (JARDIANCE) 10 MG tablet Take 1 tablet by mouth daily Yes Chelsey Shah MD   blood glucose test strips (ONE TOUCH ULTRA TEST) strip TEST THREE TIMES DAILY Yes Chelsey Shah MD   clindamycin (CLEOCIN) 300 MG capsule Take 1 capsule by mouth 4 times daily for 10 days Yes Gino Howard MD   albuterol sulfate  (90 Base) MCG/ACT inhaler Inhale 2 puffs into the lungs every 6 hours as needed for Wheezing Yes Chelsey Shah MD   vitamin C (ASCORBIC ACID) 500 MG tablet Take 1,000 mg by mouth daily Yes Historical Provider, MD       Social History     Tobacco Use    Smoking status: Current Every Day Smoker     Packs/day: 1.00     Years: 40.00     Pack years: 40.00     Types: Cigarettes     Start date: 8/5/1957    Smokeless tobacco: Never Used    Tobacco comment: tried patches but got rash   Substance Use Topics    Alcohol use: Not on file    Drug use: No        Allergies   Allergen Reactions    Aricept [Donepezil Hydrochloride] Diarrhea    Exelon [Rivastigmine Tartrate] Rash and Other (See Comments)     burn    Vancomycin Rash   ,   Past Medical History:   Diagnosis Date    Acute pain of right shoulder 10/20/2020    Adult celiac disease 2013    Cellulitis of right foot 8/28/2017    COPD (chronic obstructive pulmonary disease) ([facility] [address]     Deformity of toe of left foot     Diverticulosis of colon     Hernia, abdominal     Lung disease     Memory loss or impairment 2016    Personal history of testicular cancer  S/P colonoscopy with polypectomy 2013 DUE 2018    Tubular adenoma of colon 2013    Dr Brenda Mustafa D deficiency 2014   ,   Past Surgical History:   Procedure Laterality Date   Sergey Petite Right 2013    Dr Nita Mcarthur    cancer   ,   Social History     Tobacco Use    Smoking status: Current Every Day Smoker     Packs/day: 1.00     Years: 40.00     Pack years: 40.00     Types: Cigarettes     Start date: 8/5/1957    Smokeless tobacco: Never Used    Tobacco comment: tried patches but got rash   Substance Use Topics    Alcohol use: Not on file    Drug use: No   ,   Family History   Family history unknown: Yes       PHYSICAL EXAMINATION:  [ INSTRUCTIONS:  \"[x]\" Indicates a positive item  \"[]\" Indicates a negative item  -- DELETE ALL ITEMS NOT EXAMINED]  Vital Signs: unavailable    Patient appears to be alert and oriented to person, place, time, situation and is in no acute distress. Respiratory effort appears normal. Mood appears stable and speech and thought are grossly normal.    ASSESSMENT/PLAN:  Holger was seen today for foot pain, diabetes and hypertension. 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 Word recall 3/3.   Orders:  -     memantine (NAMENDA) 5 MG tablet; TAKE 1 TABLET BY MOUTH TWICE DAILY    Vitamin B12 deficiency -     vitamin B-12 (CYANOCOBALAMIN) 1000 MCG tablet; Take 1 tablet by mouth daily    Vitamin D deficiency  -     Cholecalciferol (VITAMIN D3) 50 MCG (2000 UT) CAPS; 1 po daily with meal    Other orders  -     metFORMIN (GLUCOPHAGE) 1000 MG tablet; TAKE 1 TABLET BY MOUTH TWICE DAILY WITH MEALS  -     empagliflozin (JARDIANCE) 10 MG tablet; Take 1 tablet by mouth daily          Return in about 3 months (around 3/21/2021) for DM, dementia - OVJorge Bragg is a [de-identified] y.o. male being evaluated by a Virtual Visit (telephonic visit) encounter to address concerns as mentioned above. A caregiver was present when appropriate. Due to this being a TeleHealth encounter (During ETXPO-24 public health emergency), evaluation of the following organ systems was limited: Vitals/Constitutional/EENT/Resp/CV/GI//MS/Neuro/Skin/Heme-Lymph-Imm. Pursuant to the emergency declaration under the [address] [place] [address] and the [employer] and [employer] Act, this Virtual Visit was conducted with patient's (and/or legal guardian's) consent, to reduce the patient's risk of exposure to COVID-19 and provide necessary medical care. The patient (and/or legal guardian) has also been advised to contact this office for worsening conditions or problems, and seek emergency medical treatment and/or call 911 if deemed necessary. Services were provided through a telephonic synchronous discussion virtually to substitute for in-person clinic visit. Patient and provider were located at their individual homes. --Romelia Nguyen MD on 12/21/2020 at 11:15 AM    An electronic signature was used to authenticate this note.

## 2020-12-21 NOTE — PATIENT INSTRUCTIONS
Patient Education        Planificación anticipada de la atención médica: Instrucciones de cuidado  Advance Care Planning: Care Instructions  Instrucciones de cuidado    Puede ser difícil vivir con katina enfermedad incurable. Blank si bell nevaeh está empeorando, sería conveniente trev decisiones acerca de los cuidados al final de bell mckay. Planear el final de bell mckay no significa que se esté dando por vencido. Es katina forma de asegurarse de que se cumplan akhil deseos. Expresar claramente akhil deseos puede hacer todo más fácil para akhil seres queridos. Hacer planes mientras aún sea capaz también podría tranquilizarle y hacer que akhil últimos días tengan sentido y ab menos estresantes. La atención de seguimiento es katina parte clave de bell tratamiento y seguridad. Asegúrese de hacer y acudir a todas las citas, y llame a bell médico si está teniendo problemas. También es katina buena idea saber los resultados de akhil exámenes y mantener katina lista de los medicamentos que franny. ¿Qué puede hacer para planear el final de bell mckay? · Usted puede sacar estos temas con bell médico. No tiene que esperar a que bell médico empiece la conversación. Podría comenzar diciendo \"No quisiera vivir con. .. \". Completar rosa maria frase ayudará a bell médico a entender akhil deseos. · Hable abierta y honestamente con bell médico. Esta es la mejor manera de entender las decisiones que deberá trev a medida que cambie bell nevaeh. Sepa que siempre puede cambiar de opinión. · Pregúntele a bell médico acerca de los tratamientos de soporte vital (mantenimiento artificial de la mckay) más utilizados. Estos incluyen alimentación por sondas, respiradores y líquidos administrados a través de katina vena (por vía intravenosa o IV). Entender estos tratamientos le ayudará a decidir si desea recibirlos. · Considere un testamento vital. Un testamento vital explica akhil deseos relacionados con el soporte vital y otros tratamientos al final de la mckay si pierde la capacidad de hablar por sí mismo. Los testamentos vitales les informan a los médicos si deben utilizar o no tratamientos para mantenerle con mckay. Debe tiki luis o dos testigos o un notario presentes cuando firme oliva formulario. En el estado donde vive, un testamento vital podría llamarse de Ada Katz. · Considere un poder notarial para asuntos médicos. Oliva documento le permite nombrar a katina persona para que tome decisiones sobre bell cuidado si usted no puede hacerlo. Neumann Pears de las personas designan a un amigo cercano o un familiar. Hable con esta persona sobre los tipos de tratamientos que desea y los que no desea. Asegúrese de que esta persona entienda akhil deseos. Un poder notarial para asuntos médicos podría llamarse de otra manera en [employer]. Estos documentos legales con sencillos de cambiar. Dígale a bell médico lo que desea [place] y pídale que escriba katina nota en bell expediente médico. Naseme a bell sarthak copias actualizadas de estos documentos. ¿Dónde puede encontrar más información en inglés? Phuong Mason a [email or web address] e ingrese a bell cuenta de MyChart. Lawerance Hiss P184 en el [place] Leal \"Search Health Information\" para más información (en inglés) sobre \"Planificación anticipada de la atención médica: Instrucciones de cuidado. \"     Si no tiene katina cuenta, madhu susan en el enlace \"Sign Up Now\". Revisado: 9 diciembre, 2019               Versión del contenido: 12.5  © 9278-2629 Healthwise, Incorporated. Las instrucciones de cuidado fueron adaptadas bajo licencia por [facility] ([facility]). Si usted tiene [place] afección médica o sobre estas instrucciones, siempre pregunte a bell profesional de nevaeh. Healthwise, Incorporated niega toda garantía o responsabilidad por bell uso de esta información.          Patient Education Decisión sobre la administración de líquidos intravenosos (IV) o alimentación por sonda cuando tiene katina enfermedad terminal  Deciding About IV Fluids or Tube Feedings When You Have a Terminal Illness  ¿Cómo puede trev katina decisión acerca de la administración de líquidos por vía intravenosa o la alimentación por sonda cuando tiene katina enfermedad terminal?  Instrucciones de cuidado  Los líquidos que se administran en katina vena (por vía intravenosa o IV) y la alimentación por sonda se pueden utilizar cuando usted ya no pueda comer ni beber por la boca. Los líquidos intravenosos se administran a través de katina aguja que se coloca dentro de katina vena. Se pueden administrar alimentos líquidos a través de katina sonda que va por la nariz hasta el BJURHOLM. También se pueden administrar a través de katina sonda que se coloca en el abdomen mediante cirugía. Usted tiene la opción de decidir si desea recibir líquidos intravenosos o alimentación por sonda si ya no puede comer ni beber por sí solo. [place] no curará bell enfermedad y puede resultarle incómodo. Blank también podría hacerle sentir mejor o vivir por [employer]. Sin los líquidos intravenosos y la alimentación por sonda, bell cuerpo decaerá en forma natural. Lo más probable es que no sienta hambre. Además, bell médico tomará medidas para mantenerlo cómodo hasta que fallezca. La decisión de recibir líquidos intravenosos y alimentación por sonda es personal. Podría decidir que prefiere luis blank no el otro. Asegúrese de hablar con bell médico y akhil seres queridos sobre esta decisión. La atención de seguimiento es katina parte clave de bell tratamiento y seguridad. Asegúrese de hacer y acudir a todas las citas, y llame a bell médico si está teniendo problemas. También es katina buena idea saber los resultados de akhil exámenes y mantener katina lista de los medicamentos que franny. ¿Por qué podría querer recibir líquidos intravenosos o alimentación por sonda? · Es posible que le ayude a recuperarse de katina breve enfermedad o lesión. · Podría ayudarle a mejorar la calidad del tiempo que le [place]. · Angie que se debería trev toda medida posible para preservar la mckay, independientemente de bell calidad. · Hay yonatan de que existe o pronto existirá katina aria para bell afección. ¿Por qué podría no querer recibir líquidos intravenosos o alimentación por sonda? · No puede curar katina enfermedad terminal.  · Podría prolongarle la mckay, nahum devan vez no lo madhu sentir más cómodo ni aumente la calidad de la mckay que le [place]. · Hay más riesgos que beneficios. Los riesgos [employer], neumonía y [address], tales jose diarrea. · No desea que lo mantengan vivo en forma artificial.  ¿Cuándo debe pedir ayuda? Asegúrese de comunicarse con bell médico si:  · Desea más información sobre los líquidos intravenosos y la alimentación por sonda. · Desea cambiar bell decisión sobre recibir líquidos intravenosos y alimentación por sonda. ¿Dónde puede encontrar más información en inglés? Michael Rachel a [email or web address] e ingrese a bell cuenta de MyChart. Lali De La Torre A737 en el Fern Uziel \"Search Health Information\" para más información (en inglés) sobre \"Decisión sobre la administración de líquidos intravenosos (IV) o alimentación por sonda cuando tiene katina enfermedad terminal.\"     Si no tiene katina cuenta, madhu susan en el enlace \"Sign Up Now\". Revisado: 9 emilianombre, 2019               Versión del contenido: 12.5  © 8941-9664 Healthwise, Incorporated. Las instrucciones de cuidado fueron adaptadas bajo licencia por [facility] ([facility]). Si usted tiene [place] afección médica o sobre estas instrucciones, siempre pregunte a bell profesional de nevaeh. [facility] niega toda garantía o responsabilidad por bell uso de esta información.          Patient Education        Decisión sobre el tratamiento para prolongar la mckay  Deciding About Life-Prolonging Treatment ¿Cómo puede decidir sobre el tratamiento para prolongar la mckay? ¿Qué es el tratamiento para prolongar la mckay? Hay muchos tipos de tratamiento que pueden ayudarle a vivir [employer]. Estos pueden ser [employer] solo kamla un corto tiempo hasta que bell enfermedad mejore. O podría usarlos a ester plazo para ayudar a mantenerle vivo. Algunos tratamientos incluyen el uso de:  · Medicamentos para retrasar el avance de ciertas enfermedades, jose enfermedad cardíaca, diabetes, cáncer, SIDA o la enfermedad de Alzheimer. · Antibióticos para tratar infecciones graves, jose la neumonía. · Diálisis para purificar la eli si akhil riñones christina de funcionar. · Un respirador para ayudarle a respirar, si no lo puede hacer por sí mismo. Esta máquina bombea aire a los pulmones a través de un tubo que se coloca en bell garganta. · Katina sonda de alimentación o katina línea intravenosa (IV) para darle alimentos y líquidos si no puede comer o beber. · Reanimación cardiopulmonar (RCP) para tratar de reactivar bell corazón. La decisión de recibir tratamientos que puedan ayudarle a vivir más tiempo es personal. Es posible que desee que bell médico madhu todo lo posible para mantenerle vivo, aun cuando bell probabilidad de recuperación sea pequeña. O podría elegir solamente recibir cuidados para controlar el dolor y otros síntomas. ¿Cuáles son los puntos clave de esta decisión? · Si existe katina buena probabilidad de que bell enfermedad se pueda curar o controlar, bell médico podría aconsejarle que pruebe anthony los tratamientos disponibles. Si estos no funcionan, entonces podría pensar en suspender el tratamiento. · Si interrumpe el tratamiento, usted seguirá recibiendo cuidados que se centran en el alivio del dolor y la comodidad. · La decisión de interrumpir el tratamiento que le mantiene vivo no tiene que ser [place]. Siempre puede cambiar de opinión si bell nevaeh comienza a mejorar. Las instrucciones de cuidado fueron adaptadas bajo licencia por [facility] ([facility]). Si usted tiene [place] afección médica o sobre estas instrucciones, siempre pregunte a bell profesional de nevaeh. [facility] niega toda garantía o responsabilidad por bell uso de esta información. Patient Education        Aprenda sobre los testamentos vitales  Learning About Living [place]  ¿Qué es un testamento vital?    Un testamento vital, también llamado declaración, es un documento legal. Informa a bell sarthak y a bell médico sobre akhil deseos cuando no puede hablar por sí mismo. Es usado por los profesionales de nevaeh que lo tratarán cuando usted está cerca del final de bell mckay o si se accidenta o enferma gravemente. Si usted pone por escrito akhil deseos, akhil seres queridos y los demás sabrán qué tipo de atención desea. No tendrán que adivinar. [place] puede darle mendieta interior y ser útil a los demás. Y usted puede cambiar o anular bell testamento vital en cualquier momento. Un testamento vital no es lo mismo que un testamento de patrimonio o propiedad. Un testamento de patrimonio explica lo que usted quiere que suceda con bell dinero y akhil bienes después de bell muerte. ¿Cómo se [place]? Un testamento vital se [place] para describir las clases de tratamiento o de soporte vital que desea al acercarse al final de la mckay o si se accidenta o enferma gravemente. Tenga en cuenta lo siguiente sobre los testamentos vitales. · Bell testamento vital solo se utiliza si no puede hablar ni trev decisiones por sí mismo. La mayoría de las veces, luis o más médicos deben certificar que usted no puede hablar ni decidir por sí mismo antes de que bell testamento vital entre en vigor. · Si usted mejora y puede hablar por sí mismo nuevamente, puede aceptar o negarse a cualquier tratamiento.  No importa lo que usted haya dicho en bell testamento vital. · Algunos estados pueden restringir bell derecho a negarse a tratamiento en casos determinados. Por ejemplo, es posible que usted tenga que indicar claramente en bell testamento vital que no desea hidratación ni nutrición artificial, jose alimentación por sonda. ¿Es un testamento vital un documento legal?  Un testamento vital es un documento legal. Cada estado tiene akhil propias leyes sobre los testamentos vitales. Y un testamento vital podría llamarse de otra manera en bell estado. A continuación hay algunas cosas que debe saber sobre los testamentos vitales. · Usted no necesita un abogado para completar un testamento vital. Blank puede serle útil la asesoría legal si las leyes del [place] no son claras. También puede ayudar si akhil antecedentes de nevaeh son complicados o si bell sarthak no se pone de acuerdo en lo que debe incluirse en bell testamento vital.  · Usted puede cambiar bell testamento vital en cualquier momento. Algunas personas encuentran que akhil deseos de atención al final de la mckay [place] a medida que bell nevaeh cambia. Si hace grandes cambios en bell testamento vital, complete un nuevo documento. · Si se muda a [employer], asegúrese de que bell testamento vital sea legal en el estado donde vive ahora. En la [employer], los médicos respetarán akhil deseos aun si tiene un documento de [employer]. · Usted podría usar un formulario universal que haya sido aprobado en muchos estados. Oliva tipo de documento a veces puede completarse y guardarse en línea. Bell copia digital, por lo tanto, estará disponible en cualquier lugar donde tenga conexión a Internet. Lora Slade y el personal de enfermería que necesiten tratarlo pueden encontrar el documento de inmediato. · Bell estado podría ofrecer un registro en línea. Oliva es otro lugar donde puede guardar bell testamento vital en línea. · Es Lyle Sieving buena idea notarizar bell testamento vital. [place] consiste en hacer que katina persona llamada notario público esté presente [employer] firm, o ab testigos de, [place] testamento vital.  ¿Qué debe saber cuando prepara un testamento vital?  Aquí hay algunas preguntas para hacerse cuando prepara bell testamento vital:  · ¿Conoce lo suficiente sobre los métodos de soporte vital que podrían utilizarse? Si no, hable con bell médico de [employer] sepa qué se podría hacer si no puede respirar por sí mismo, bell corazón se detiene o no es capaz de tragar. · ¿Qué cosas desearía todavía poder hacer después de recibir métodos de soporte vital? ¿Le gustaría ser [place] de caminar? ¿Hablar? ¿[place] por bell cuenta? ¿Vivir sin la ayuda de máquinas? · ¿Desea que se realicen ciertos ritos religiosos si se enferma muy gravemente? · Si puede elegir, ¿dónde quiere ser atendido? ¿En bell casa? ¿En un hospital o en un [facility] Jorge Alberto Herrmann? · Si tiene katina opción al final de bell mckay, ¿dónde preferiría morir? ¿En casa? ¿En un hospital u [facility] Jorge Alberto Herrmann? [place] urban? · ¿Preferiría ser [place] o cremado? · ¿Quiere que akhil órganos se donen después de bell muerte? ¿Qué debe hacer con bell testamento vital?  · Asegúrese de que akhil familiares y bell representante de atención médica tengan copias de bell testamento vital (también llamado declaración). · Naseem a bell médico katina copia de bell testamento vital. Pídale que lo incluya jose parte de bell expediente médico. Si tiene más de un médico, asegúrese de que cada luis tenga katina copia. · Coloque katina copia de bell testamento vital donde pueda encontrarse fácilmente. Por ejemplo, algunas personas podrían poner katina copia en la gaye de bell refrigerador. Si [place] katina copia digital, asegúrese de que bell médico, familiares y representante de atención médica sepan cómo encontrarlo y acceder al mismo. ¿Dónde puede encontrar más información en inglés? Debe firmar el documento para que sea legal. Lara Schroeder estados requieren que un notario certifique el documento. [place] significa que katina persona llamada notario público lo ve firmar el documento y luego firma él mismo. Algunos estados además exigen que al Kylie testigos firmen el documento. Asegúrese de decirles a akhil familiares y Marvelyn Faster es bell representante de [facility]. ¿Dónde puede encontrar más información en inglés? Tamra Fatima a [email or web address] e ingrese a bell cuenta de MyChart. Stephanie Busch P737 en el Jason Plana \"Search Health Information\" para más información (en inglés) sobre \"Aprenda sobre el poder notarial médico.\"     Si no tiene katina cuenta, madhu susan en el enlace \"Sign Up Now\". Revisado: 9 diciembre, 2019               Versión del contenido: 12.5  © 8213-9933 Healthwise, Incorporated. Las instrucciones de cuidado fueron adaptadas bajo licencia por [facility] ([facility]). Si usted tiene [place] afección médica o sobre estas instrucciones, siempre pregunte a bell profesional de nevaeh. Healthwise, Incorporated niega toda garantía o responsabilidad por bell uso de esta información. Patient Education        Instrucciones médicas por anticipado: Instrucciones de cuidado  Advance Directives: Care Instructions  Generalidades  Las instrucciones médicas por anticipado son Chery Prows legal de afirmar akhil deseos al final de bell mckay. Informan a bell sarthak y bell médico acerca de lo que deben hacer si usted no puede expresar lo que desea. Clifton Irwin tipos principales de instrucciones médicas por anticipado. Usted puede modificarlas en cualquier momento que akhil deseos cambien. Testamento vital.   Oliva documento les comunica a bell sarthak y a bell médico akhil deseos sobre soporte vital y demás tratamientos. El documento también se llama declaración.    Poder notarial médico. Oliva documento le permite designar a katina persona para que tome decisiones sobre el tratamiento en bell nombre cuando usted no puede hablar por sí mismo. Esta persona se llama representante de atención médica (apoderado para asuntos médicos). El documento [place]ma se llama poder notarial permanente para asuntos médicos. Si no tiene instrucciones médicas por anticipado, las decisiones relacionadas con akhil cuidados médicos podrían ser [employer] por un familiar, o por un médico o un juez que no lo conoce. Puede ser útil pensar en las instrucciones médicas por anticipado jose un regalo a las personas que lo [place]. Si tiene instrucciones médicas por anticipado, akhil seres queridos no tendrán que trev decisiones difíciles por sí solos. La atención de seguimiento es katina parte clave de bell tratamiento y seguridad. Asegúrese de hacer y acudir a todas las citas, y llame a bell médico si está teniendo problemas. También es katina buena idea saber los resultados de akhil exámenes y mantener katina lista de los medicamentos que franny. ¿Qué debe incluir en akhil instrucciones médicas por anticipado? Muchos estados tienen un documento único de instrucciones médicas por anticipado. (Podría requerir que aborde cuestiones específicas). O podría usar un documento universal que haya sido aprobado por muchos estados. Si bell documento no indica lo que debe tratar, puede ser difícil saber lo que debe incluir en akhil instrucciones por anticipado. Utilice las preguntas a continuación jose ayuda para empezar. · ¿Quién quiere que tome las decisiones sobre bell atención médica si usted no puede hacerlo? · ¿Qué medidas de soporte vital desea si tiene katina enfermedad grave que empeora con el tiempo o que no puede curarse? · ¿Qué es lo que más teme que podría pasar? (Podría tener miedo al Ariela Schulz, a perder la independencia o a que aparatos lo mantengan con mckay). · ¿Dónde preferiría morir? (¿En bell casa?  ¿Un hospital? ¿Un hogar para ancianos o clínica?) La decisión de estar conectado a un respirador es personal. Asegúrese de hablar con bell médico y akhil seres queridos sobre esta decisión. La atención de seguimiento es katina parte clave de bell tratamiento y seguridad. Asegúrese de hacer y acudir a todas las citas, y llame a bell médico si está teniendo problemas. También es katina buena idea saber los resultados de akhil exámenes y mantener katina lista de los medicamentos que franny. ¿Por qué podría querer estar conectado a un respirador? · Piensa que podría retomar akhil actividades habituales. · Necesita ayuda para respirar debido a katina enfermedad a corto plazo o un problema que no está relacionado con bell enfermedad terminal.  · Le gustaría tener más tiempo para despedirse. · Piensa que hay más beneficios que riesgos. ¿Por qué podría no querer estar conectado a un respirador? · Tiene otros problemas crónicos de [place]. · Quizás no pueda retomar akhil actividades habituales. · Desea katina muerte tranquila y en mendieta. No quiere pasar el hollie de bell mckay conectado a un respirador. · Piensa que hay más riesgos que beneficios. ¿Cuándo debe pedir ayuda? Asegúrese de comunicarse con bell médico si:  · Desea aprender más acerca de estar conectado a un respirador. · Cambia de opinión acerca de estar conectado a un respirador. ¿Dónde puede encontrar más información en inglés? Luana Curiel a [email or web address] e ingrese a bell cuenta de MyChart. Linda Gunter P896 en el Charmike Maradiaga \"Search Health Information\" para más información (en inglés) sobre \"Decisión sobre estar conectado a un respirador cuando tiene katina enfermedad terminal.\"     Si no tiene katina cuenta, madhu clerasto en el enlace \"Sign Up Now\". Revisado: 9 diciembre, 2019               Versión del contenido: 12.5  © 3352-1626 Healthwise, Incorporated. Las instrucciones de cuidado fueron adaptadas bajo licencia por [facility] ([facility]). Si usted tiene [place] afección médica o sobre estas instrucciones, siempre pregunte a bell profesional de nevaeh. [facility] niega toda garantía o responsabilidad por bell uso de esta información. Patient Education        Nutrición para las personas mayores: Instrucciones de cuidado  Nutrition for Older Adults: Care Instructions  Instrucciones de cuidado    Katina buena nutrición es importante a cualquier edad. Blank es especialmente importante para las personas mayores. Alimentarse en forma saludable ayuda a bell cuerpo a mantenerse renny. Y puede ayudar a reducir bell riesgo de enfermedades. Con el paso de [place], akhil necesidades de Frank. Bell organismo necesita más de ciertos nutrientes. Estos incluyen vitamina B12, calcio y vitamina D. Blank puede ser más difícil para usted obtener estos y otros nutrientes importantes. [place] podría ser por muchos motivos. Es posible que no tenga tanta hambre jose [employer]. O podría tener problemas con los dientes o la boca que pueden darle dificultades para masticar. O devan vez no disfrute planear y preparar comidas, especialmente si vive solo. Ahora que necesita obtener todos akhil nutrientes de menos cantidad de comida, es importante que planifique lo que come. Leldon Fell a continuación pueden ayudarle a conseguir la nutrición que necesita. Si todavía necesita ayuda, hable con bell médico. Devan vez le recomiende que colabore con un dietista. Un dietista puede ayudarle a planificar las comidas. La atención de seguimiento es katina parte clave de bell tratamiento y seguridad. Asegúrese de hacer y acudir a todas las citas, y llame a bell médico si está teniendo problemas. También es katina buena idea saber los resultados de akhil exámenes y mantener katina lista de los medicamentos que franny. ¿Cómo puede cuidarse en el hogar?   Para mantenerse saludable · Coma alimentos variados. Mientras mayor sea la variedad de alimentos que coma, más vitaminas, minerales y otros nutrientes obtiene. · [place] un multivitamínico todos los merari. Elija luis con aproximadamente el 100% del valor diario (VD) de vitaminas y minerales. No tome más del 100% del valor diario de ninguna vitamina o mineral, a menos que bell médico se lo indique. Hable con bell médico si no está seguro de qué multivitamínico es el adecuado para usted. · Coma muchas frutas y verduras. Las verduras y las frutas frescas, congeladas o enlatadas, sin agregado de sal y con akhil propios jugos o almíbar con poca azúcar, son Janessa Sabra opciones. · Incluya en bell dieta alimentos que ab ricos en vitamina B12. Un desayuno que incluya cereales enriquecidos, [place] y otros productos lácteos sin Debi Brothers o bajos en grasa, carne, aves, pescado y huevos son Janessa Inmaning opciones. · Obtenga suficiente calcio y vitamina D. Jackie [place] y yogur descremados y semidescremados. Otras buenas opciones son tofu, jugo de naranja con calcio agregado, y algunas verduras de hoja, joes la col berza y la col rizada. Si no consume productos lácteos, hable con bell médico acerca de suplementos de calcio y de vitamina D.  · Coma alimentos con proteínas todos los días. Las buenas opciones incluyen kaushal Serge ravi, pescado, carne bailee Connors, VANE y Ailin. Otras buenas opciones son frijoles (habichuelas) cocidos, mantequilla de cacahuate (maní) y glenna secos y semillas. · Asegúrese de que la mitad de los granos que consume ab granos integrales. Busque que el pan sea 100% bebo integral, y [facility], el arroz y los otros granos también ab integrales. Si tiene estreñimiento  · Coma alimentos ricos en [facility]. Estos incluyen frutas, verduras, frijoles secos cocidos y granos integrales. · Maureen líquidos en abundancia, lo suficiente jose para que bell orina sea de color amarillo mari o transparente jose el agua. Si tiene enfermedad renal, cardíaca o hepática y tiene que restringir los líquidos, hable con bell médico antes de aumentar la cantidad de líquidos que shasha. · Pregúntele a bell médico si los ablandadores de heces pueden ayudarle a regularizar el intestino. Si tiene problemas en la boca que le dificultan la masticación  · Elija frutas y verduras enlatadas o cocidas. Estas suelen ser más blandas. · Mack la carne de res, de ave y el pescado en trozos o tiras. Agréguele a la carne salsa o jugo de la propia carne para que no se seque. · Escoja otros alimentos con proteínas que ab blandos. Estos [employer], la [place], los frijoles cocidos, el queso requkieranón y Michelle. Si tiene dificultades para hacer las compras  · Pida en la justus que le envíen a bell casa los productos que compre. · Comuníquese con un centro de voluntarios y [facility]. · Pídale a un familiar o a un vecino que le ayude. Si tiene dificultades para cocinar  · Si puede, tome clases de cocina. · Use un horno de microondas para cocinar comidas preparadas en bandeja y otros alimentos congelados o preparados. · Participe en un programa grupal de comidas. Usted puede encontrar estos por medio de programas para la tercera edad. · Hágase enviar las comidas a bell domicilio. Bell comunidad puede ofrecer programas que entreguen comidas a domicilio, jose \"Meals on [employer]". Si no tiene mucho apetito  · Trate de comer pequeñas cantidades de alimentos con más frecuencia. Por ejemplo, coma 4 o 5 comidas ligeras al día en lugar de 1 o 2 comidas grandes. · Coma con amigos y familiares. O participe de programas grupales de comidas ofrecidos por medio de programas voluntarios. Hannah con otros puede ayudar con bell apetito.  Y le ayuda a tener más mckay social. · Pregúntele a bell médico si akhil medicamentos podrían estar causándole problemas de apetito o con el sabor de los alimentos. Si es así, pregúntele si puede [employer]. · Bryan Panning y hierbas para darles más sabor a las comidas. · Si piensa que está deprimido, pídale ayuda a bell médico. La depresión puede afectarle el apetito. Y puede darle dificultades para hacer actividades diarias jose hacer las compras y cocinar. El tratamiento puede ayudar. ¿Cuándo debe pedir ayuda? Llame [id number] en cualquier momento que considere que necesita atención de emergencia. Por ejemplo, llame si:  · Tiene dolor de estómago intenso. · Se desmayó (perdió el conocimiento). Llame a bell médico ahora mismo o busque atención médica inmediata si:  · Tiene señales de necesitar más líquidos. Tiene los ojos hundidos y la boca seca, y orina sólo poca cantidad de color oscuro. · Tiene dolor e hinchazón en la micheal anal, o pus que drena de la micheal anal.  · Tiene fiebre o escalofríos. · Siente el estómago inflado. Vigile de cerca los cambios en bell nevaeh y asegúrese de comunicarse con bell médico si:  · Akhil síntomas José Shove. · Tiene diarrea por más de 2 semanas. · Tiene pérdida de peso inexplicada. ¿Dónde puede encontrar más información en inglés? Berenda Gens a [email or web address] e ingrese a bell cuenta de MyChart. Eliana Bong R908 en el Clide Stalling \"Search Health Information\" para más información (en inglés) sobre \"Nutrición para las personas mayores: Instrucciones de cuidado. \"     Si no tiene katina cuenta, madhu clic en el enlace \"Sign Up Now\". Revisado: 22 agosto, 2019               Versión del contenido: 12.5  © 9881-7022 [facility] [employer]. Las instrucciones de cuidado fueron adaptadas bajo licencia por [facility] ([facility]). Si usted tiene [place] afección médica o sobre estas instrucciones, siempre pregunte a bell profesional de nevaeh. [employer]orporated niega toda garantía o responsabilidad por bell uso de esta información. Patient Education        Aprenda sobre hacer actividad cuando es un adulto mayor  Learning About Being Active as an Older Adult  ¿Por qué es importante estar activo a medida que envejece? Estar activo es katina de las mejores cosas que puede hacer por bell nevaeh. Y nunca es demasiado tarde para comenzar. Mantenerse activo, o comenzar a hacer actividad si aún no la hace, tiene kiana beneficios. Puede:  · Darle más energía. · Mantener la [employer]. · Mejorar el equilibrio para reducir el riesgo de caídas. · Ayudar a controlar las enfermedades crónicas con menos medicamentos. Sin importar la edad que tenga, bell estado físico o los problemas de nevaeh que tenga, hay un tipo de actividad que le funcionará a usted. Y cuanta más actividad física pueda hacer, mejor será bell nevaeh general.  ¿Cómo puede incorporar la actividad en bell mckay? Ser más D[employer] facilitará las actividades diarias. La actividad física incluye el ejercicio planificado y cosas que usted hace en bell mckay diaria. Hay cuatro tipos de [place]:  Aeróbica. Hacer actividad aeróbica fortalece el corazón y los pulmones. Incluye caminar, bailar y practicar la jardinería. Intente hacer al menos 2½ horas de [place] a lo ester de la semana. La Alt[employer] Group más energía y podría ayudarle a dormir mejor. Fortalecimiento muscular. Oliva tipo de actividad puede ayudarle a mantener la musculatura y Coca [employer]a. Incluye subir escaleras, usar bandas elásticas y levantar o transportar cargas pesadas. Trate de Cel[employer] Corporation tipo de actividad al [place] veces por semana. Puede ayudar a proteger [place] Clarity y otras articulaciones. Estiramientos. Los estiramientos le brindan katina mayor amplitud de movimiento en las articulaciones y los músculos. Incluyen estiramientos de la parte superior del brazo, estiramientos de la pantorrilla y yoga suave. Trate de hacer estiramientos al STEARCLEAR por semana, preferentemente después de que los músculos se hayan calentado ihsan a Perez. Pueden ayudarle a desempeñarse mejor en la mckay diaria. Equilibrio. [place] le ayuda a mantener la coordinación y [place] Heavenly. Incluye la marcha en tándem, el nick chi y ciertos tipos de yoga. Trate de hacer esto al menos kami días a la semana. Puede reducir [employer] de caídas. Incluso si tiene dificultad para cumplir con las recomendaciones, es mejor estar más activo que [place]. Cyn Min actividad que madhu en cada categoría cuenta para el total semanal. Le sorprendería saber cómo se [employer] Services cotidianas, tales jose cargar bolsas de alimentos, jugar con akhil nietos y subir las escaleras. ¿Qué le impide estar activo? Si ha tenido dificultad para hacer más actividad, usted no está solo. Quizás recuerde que antes podía Jamie's. O devan vez nunca se haya considerado katina persona activa. Es frustrante no poder [employer] que desea. Estar más activo puede ayudar. ¿Qué lo detiene a usted? Cómo empezar. Tenga katina meta, nahum divídala en tareas fáciles. Los pequeños pasos se convierten en grandes logros. Cómo mantenerse motivado. Si siente que no tiene ganas de [place], recuerde bell meta. Devan vez desea poder moverse mejor y mantenerse independiente. Cyn Min actividad que madhu le servirá para acercarse a rosa maria meta. Harolyn Amando si no se siente perfectamente. Comience con 5 minutos de katina actividad que disfrute. Demuéstrese que puede hacerlo. A medida que se sienta más cómodo, aumente la cantidad de [place]. Es posible que aún no se encuentre donde quiere estar. Blank está en proceso de llegar allí. Todo el alexa empieza en algún lugar. ¿Cómo puede encontrar formas seguras de [place] activo? Hable con bell médico sobre cualquier desafío físico que enfrente. Madhu un plan con bell médico si tiene un problema de nevaeh o no está seguro de cómo empezar a hacer actividad. Si tiende a sentirse [employer]J's de trev medicamentos, evite la actividad en tj momento. Trate de hacer actividad antes de trev akhil medicamentos. [place] reducirá bell riesgo de caerse. Si planea hacer actividad en casa, asegúrese de despejar bell espacio antes de comenzar. Retire cosas jose cables de televisores, mesitas de café y tapetes. Lo más seguro es tener mucho espacio para moverse libremente. La clave para estar más activo es comenzar poco a poco y hacerlo con constancia. Procure mejorar solo de a poco por vez. Elija solo un área para mejorar al principio. Y si katina actividad le causa dolor, deténgase y hable con bell médico.  ¿Dónde puede encontrar más información en inglés? Jacquelyn Long a [email or web address] e ingrese a bell cuenta de MyChart. Trey Rodriguez P600 en el cuadro \"Search Health Information\" para más información (en inglés) sobre \"Aprenda sobre hacer actividad cuando es un adulto mayor. \"     Si no tiene katina cuenta, madhu clic en el enlace \"Sign Up Now\". Revisado: 16 enero, 2020               Versión del contenido: 12.5  © 2461-8908 Healthwise, Incorporated. Las instrucciones de cuidado fueron adaptadas bajo licencia por [facility] ([facility]). Si usted tiene [place] afección médica o sobre estas instrucciones, siempre pregunte a bell profesional de nevaeh. [facility], Incorporated niega toda garantía o responsabilidad por bell uso de esta información.          Patient Education        Alarmas de seguridad en el hogar: Instrucciones de cuidado  Home Safety Alarms: Care Instructions  Instrucciones Wilfredo Kail Las alarmas de seguridad en el hogar salvan vidas. Por ejemplo, un detector de humo puede detectar pequeñas cantidades de humo. [place] puede darle tiempo para escapar de un incendio. Y un detector de monóxido de carbono puede avisarle de oliva gas mortal antes de que empiece a hacerle sentirse mal. Es importante tener las [address] clases de alarmas cerca de todos los lugares donde se duerme y en cada piso de bell casa. Usted puede comprar alarmas con diferentes opciones. Por ejemplo, si usted tiene un detector de humo que se activa por el vapor o el humo de la cocina, puede comprar luis con silenciador. [place] le permite oprimir un botón que desactiva la alarma y hace al detector menos sensible kamla un corto período de [place]. Si va a poner nuevas alarmas, busque alarmas de larga duración con pilas de litio. Es posible que también quiera fijarse en aquellas que pueden detectar tanto humo jose monóxido de carbono. En construcciones más recientes las alarmas son instaladas por un electricista. Oliva tipo de alarma es eléctrica, con katina pila de respaldo. Bell departamento de bomberos local puede darle más información sobre cómo prevenir incendios e intoxicación por monóxido de carbono. También puede ayudarle a preparar un plan de evacuación en darien de incendio, utilizar dispositivos de seguridad contra incendios y proporcionar primeros auxilios. La atención de seguimiento es katina parte clave de bell tratamiento y seguridad. Asegúrese de hacer y acudir a todas las citas, y llame a bell médico si está teniendo problemas. También es katina buena idea saber los resultados de akhil exámenes y mantener katina lista de los medicamentos que franny. ¿Cómo puede cuidarse en el hogar? · Instale detectores de humo y detectores de monóxido de carbono en bell hogar. · Instale detectores de humo:  ? En cada planta de bell hogar, en el pasillo afuera de los dormitorios y en el interior de cada dormitorio. ? En el centro del techo o en katina pared a entre 6 y 15 pulgadas (entre 15 y 27 cm) del techo. Es aquí donde el humo se desplaza anthony. Evite los lugares cerca de las teri, las ventanas o los conductos de ventilación. · Instale un detector de monóxido de carbono en el pasillo afuera de las habitaciones en cada micheal del hogar en que haya dormitorios. El detector debería instalarse en la parte más theo de la pared. Asegúrese de que los muebles o las estiven no tapen el detector. · Asegúrese de que akhil alarmas de seguridad funcionen en todo momento. Puede probarlas cada mes presionando el botón de prueba. · Si katina alarma emite un chirrido, reemplace la pila de inmediato. · Reemplace dos veces al año las pilas que no ab de litio. Ponga esto en bell calendario anticipadamente. Algunas personas [employer] pilas de las alarmas cuando hacen el cambio de horario en akhil relojes en la primavera y en el otoño. · Reemplace los detectores de humo cada 10 años. · Planifique y practique rutas de escape de incendios. Asegúrese de tener por lo [address] para cada área de bell casa. Hugo incluye los pisos superiores y el sótano. ¿Cuándo debe pedir ayuda? Llame [id number] en cualquier momento que considere que necesita atención de [place]. Por ejemplo, llame si:  · Suena el detector de humo o de monóxido de carbono. Dígales a todos que salgan del edificio. Permanezcan afuera hasta que lleguen los bomberos. Preste especial atención a los cambios en bell nevaeh y asegúrese de comunicarse con bell médico si tiene preguntas sobre cómo utilizar katina alarma de seguridad en el hogar. ¿Dónde puede encontrar más información en inglés? Phuong Mason a [email or web address] e ingrese a bell cuenta de MyChart. Lawerance Hiss S719 en el [place] \"Search Health Information\" para más información (en inglés) sobre \"Alarmas de seguridad en el hogar: Instrucciones de cuidado. \"     Si no tiene katina cuenta, madhu susan en el enlace \"Sign Up Now\". Revisado: 22 agosto, 2019               Versión del contenido: 12.5  © 1738-9854 [facility], [employer]. Las instrucciones de cuidado fueron adaptadas bajo licencia por [facility] ([facility]). Si ted tiene [place] afección médica o sobre estas instrucciones, siempre pregunte a bell profesional de nevaeh. [facility], [facility] niega toda garantía o responsabilidad por bell uso de esta información. Patient Education        Amanda Baars a entrar y salir de la bañera en forma lerma  Learning About Getting In and Out of a Bathtub Safely  Introducción  Muchas caídas ocurren al bañarse. Bart Masker rosa maria agua hace que el cuarto de baño sea un lugar resbaladizo. · Es posible que ya no pueda superar el borde de la bañera con comodidad y seguridad. · [facility] podría apoyarse en cosas que no están destinadas a soportar bell peso, jose el  Happy Kidz o la madan de [place]. Hay varios tipos de dispositivos de apoyo que le ayudarán a [facility]. Puede comprarlos en ferreterías, en tiendas de mejoras para el hogar o en Internet. ¿Qué dispositivos puede utilizar para entrar y salir de la bañera? Bhumika Gang y anirudh de sujeción para la bañera   Hay muchos tipos de barras y barandillas que puede instalar en las sanderson junto a la bañera o en el borde de la bañera. Viona Rc a mantenerse seguro al entrar o salir de la bañera. Es importante instalarlas permanentemente, en lugar de hacerlo por medio de succión. Banco de transferencia   Hay varios tipos de bancos o asientos que se pueden usar en la bañera. Bebeto de ellos se coloca con dos patas dentro la bañera y [place] fuera de [address] [place] [address]. Usted se sienta en el banco y mete anthony katina pierna y luego la otra en la bañera, deslizando al mismo tiempo bell cuerpo hacia el interior de la bañera. Otro banco común para bañera se asienta dentro de esta. Para usar sumi tipo, usted debe ser capaz de entrar en forma lerma en la bañera antes de sentarse.     Alfombrillas antideslizantes o visite el [facility] web    [email or web address]   Revisado: 7 agosto, 2019               Versión del contenido: 12.5  © 2006-2020 [employer]. Las instrucciones de cuidado fueron adaptadas bajo licencia por [facility] ([facility]). Si usted tiene [place] afección médica o sobre estas instrucciones, siempre pregunte a bell profesional de nevaeh. [employer]orporated niega toda garantía o responsabilidad por bell uso de esta información. Patient Education         Prevención de caídas: Seguridad de los medicamentos (subtitulado) (01:41)  Preventing Falls: Medicine Safety  Bell profesional de la nevaeh recomienda que malissa sumi breve video de nevaeh en [place]. Aprenda por qué algunos medicamentos pueden causarle mareos o somnolencia y cómo puede mantenerse seguro. Cómo mirar el video    Escanee el código QR   o visite el [facility] web    [email or web address]   Revisado: 7 agosto, 2019               Versión del contenido: 12.5  © 2006-2020 [employer]. Las instrucciones de cuidado fueron adaptadas bajo licencia por [facility] ([facility]). Si usted tiene [employer] [place] afección médica o sobre estas instrucciones, siempre pregunte a bell profesional de nevaeh. Healthwise, Incorporated niega toda garantía o responsabilidad por bell uso de esta información. Patient Education         Prevención de caídas: Uso de katina lista de verificación de la seguridad en el hogar (subtitulado) (01:07)  Preventing Falls: Use a Home Safety Checklist  Bell profesional de la nevaeh recomienda que malissa sumi breve video de nevaeh en [place]. Aprenda a detectar los peligros que hay en bell casa usando katina lista de verificación de la seguridad en el hogar. Cómo mirar el video    Escanee el código QR   o visite el sitio web    [email or web address]   Revisado: 7 agosto, 2019               Versión del contenido: 12.5  © 7272-1146 [facility], [facility]. Las instrucciones de cuidado fueron adaptadas bajo licencia por [facility] ([facility]). Si usted tiene [place] [employer] afección médica o sobre estas instrucciones, siempre pregunte a bell profesional de nevaeh. [facility], Incorporated niega toda garantía o responsabilidad por bell uso de esta información. Heart-Healthy Diet   Sodium, Fat, and Cholesterol Controlled Diet       What Is a Heart Healthy Diet? A heart-healthy diet is one that limits sodium , certain types of fat , and cholesterol . This type of diet is recommended for:   · People with any form of cardiovascular disease (eg, coronary heart disease , peripheral vascular disease , previous heart attack , previous stroke )   · People with risk factors for cardiovascular disease, such as high blood pressure , high cholesterol , or diabetes   · Anyone who wants to lower their risk of developing cardiovascular disease   Sodium    Sodium is a mineral found in many foods. In general, most people consume much more sodium than they need. Diets high in sodium can increase blood pressure and lead to edema (water retention). On a heart-healthy diet, you should consume no more than 2,300 mg (milligrams) of sodium per dayabout the amount in one teaspoon of table salt. The foods highest in sodium include table salt (about 50% sodium), processed foods, convenience foods, and preserved foods. Cholesterol    Cholesterol is a fat-like, waxy substance in your blood. Our bodies make some cholesterol. It is also found in animal products, with the highest amounts in fatty meat, egg yolks, whole milk, cheese, shellfish, and organ meats. On a heart-healthy diet, you should limit your cholesterol intake to less than 200 mg per day. It is normal and important to have some cholesterol in your bloodstream. But too much cholesterol can cause plaque to build up within your arteries, which can eventually lead to a heart attack or stroke. The two types of cholesterol that are most commonly referred to are:   · Low-density lipoprotein (LDL) cholesterol  Also known as bad cholesterol, this is the cholesterol that tends to build up along your arteries. Bad cholesterol levels are increased by eating fats that are saturated or hydrogenated. Optimal level of this cholesterol is less than 100. Over 130 starts to get risky for heart disease. · High-density lipoprotein (HDL) cholesterol  Also known as good cholesterol, this type of cholesterol actually carries cholesterol away from your arteries and may, therefore, help lower your risk of having a heart attack. You want this level to be high (ideally greater than 60). It is a risk to have a level less than 40. You can raise this good cholesterol by eating olive oil, canola oil, avocados, or nuts. Exercise raises this level, too. Fat    Fat is calorie dense and packs a lot of calories into a small amount of food. Even though fats should be limited due to their high calorie content, not all fats are bad. In fact, some fats are quite healthful. Fat can be broken down into four main types.    · The good-for-you fats are:   ¨ Monounsaturated fat  found in oils such as olive and canola, avocados, and nuts and natural nut butters; can decrease cholesterol levels, while keeping levels of HDL cholesterol high   ¨ Polyunsaturated fat  found in oils such as safflower, sunflower, soybean, corn, and sesame; can decrease total cholesterol and LDL cholesterol   ¨ Omega-3 fatty acids  particularly those found in fatty fish (such as salmon, trout, tuna, mackerel, herring, and sardines); can decrease risk of arrhythmias, decrease triglyceride levels, and slightly lower blood pressure   · The fats that you want to limit are:   ¨ Saturated fat  found in animal products, many fast foods, and a few vegetables; increases total blood cholesterol, including LDL levels § Animal fats that are saturated include: butter, lard, whole-milk dairy products, meat fat, and poultry skin   § Vegetable fats that are saturated include: hydrogenated shortening, palm oil, coconut oil, cocoa butter   ¨ Hydrogenated or trans fat  found in margarine and vegetable shortening, most shelf stable snack foods, and fried foods; increases LDL and decreases HDL     It is generally recommended that you limit your total fat for the day to less than 30% of your total calories. If you follow an 1800-calorie heart healthy diet, for example, this would mean 60 grams of fat or less per day. Saturated fat and trans fat in your diet raises your blood cholesterol the most, much more than dietary cholesterol does. For this reason, on a heart-healthy diet, less than 7% of your calories should come from saturated fat and ideally 0% from trans fat. On an 1800-calorie diet, this translates into less than 14 grams of saturated fat per day, leaving 46 grams of fat to come from mono- and polyunsaturated fats.    Food Choices on a Heart Healthy Diet   Food Category   Foods Recommended   Foods to Avoid   Grains   Breads and rolls without salted tops Most dry and cooked cereals Unsalted crackers and breadsticks Low-sodium or homemade breadcrumbs or stuffing All rice and pastas   Breads, rolls, and crackers with salted tops High-fat baked goods (eg, muffins, donuts, pastries) Quick breads, self-rising flour, and biscuit mixes Regular bread crumbs Instant hot cereals Commercially prepared rice, pasta, or stuffing mixes   Vegetables   Most fresh, frozen, and low-sodium canned vegetables Low-sodium and salt-free vegetable juices Canned vegetables if unsalted or rinsed   Regular canned vegetables and juices, including sauerkraut and pickled vegetables Frozen vegetables with sauces Commercially prepared potato and vegetable mixes   Fruits   Most fresh, frozen, and canned fruits All fruit juices Fruits processed with salt or sodium   Milk   Nonfat or low-fat (1%) milk Nonfat or low-fat yogurt Cottage cheese, low-fat ricotta, cheeses labeled as low-fat and low-sodium   Whole milk Reduced-fat (2%) milk Malted and chocolate milk Full fat yogurt Most cheeses (unless low-fat and low salt) Buttermilk (no more than 1 cup per week)   Meats and Beans   Lean cuts of fresh or frozen beef, veal, lamb, or pork (look for the word loin) Fresh or frozen poultry without the skin Fresh or frozen fish and some shellfish Egg whites and egg substitutes (Limit whole eggs to three per week) Tofu Nuts or seeds (unsalted, dry-roasted), low-sodium peanut butter Dried peas, beans, and lentils   Any smoked, cured, salted, or canned meat, fish, or poultry (including mallory, chipped beef, cold cuts, hot dogs, sausages, sardines, and anchovies) Poultry skins Breaded and/or fried fish or meats Canned peas, beans, and lentils Salted nuts   Fats and Oils   Olive oil and canola oil Low-sodium, low-fat salad dressings and mayonnaise   Butter, margarine, coconut and palm oils, mallory fat   Snacks, Sweets, and Condiments   Low-sodium or unsalted versions of broths, soups, soy sauce, and condiments Pepper, herbs, and spices; vinegar, lemon, or lime juice Low-fat frozen desserts (yogurt, sherbet, fruit bars) Sugar, cocoa powder, honey, syrup, jam, and preserves Low-fat, trans-fat free cookies, cakes, and pies Wallace and animal crackers, fig bars, portillo snaps   High-fat desserts Broth, soups, gravies, and sauces, made from instant mixes or other high-sodium ingredients Salted snack foods Canned olives Meat tenderizers, seasoning salt, and most flavored vinegars   Beverages   Low-sodium carbonated beverages Tea and coffee in moderation Soy milk   Commercially softened water   Suggestions   · Make whole grains, fruits, and vegetables the base of your diet. · Choose heart-healthy fats such as canola, olive, and flaxseed oil, and foods high in heart-healthy fats, such as nuts, seeds, soybeans, tofu, and fish. · Eat fish at least twice per week; the fish highest in omega-3 fatty acids and lowest in mercury include salmon, herring, mackerel, sardines, and canned chunk light tuna. If you eat fish less than twice per week or have high triglycerides, talk to your doctor about taking fish oil supplements. · Read food labels. ¨ For products low in fat and cholesterol, look for fat free, low-fat, cholesterol free, saturated fat free, and trans fat freeAlso scan the Nutrition Facts Label, which lists saturated fat, trans fat, and cholesterol amounts. ¨ For products low in sodium, look for sodium free, very low sodium, low sodium, no added salt, and unsalted   · Skip the salt when cooking or at the table; if food needs more flavor, get creative and try out different herbs and spices. Garlic and onion also add substantial flavor to foods. · Trim any visible fat off meat and poultry before cooking, and drain the fat off after lopez. · Use cooking methods that require little or no added fat, such as grilling, boiling, baking, poaching, broiling, roasting, steaming, stir-frying, and sauting. · Avoid fast food and convenience food. They tend to be high in saturated and trans fat and have a lot of added salt. · Talk to a registered dietitian for individualized diet advice. Last Reviewed: March 2011 Kimmy Ahuja MS, MPH, RD   Updated: 3/29/2011       High-Fiber Diet     What Is Fiber? Dietary fiber is a form of carbohydrate found in plants that cannot be digested by humans. All plants contain fiber, including fruits, vegetables, grains, and legumes. Fiber is often classified into two categories: soluble and insoluble. · Soluble fiber draws water into the bowel and can help slow digestion. Examples of foods that are high in soluble fiber include oatmeal, oat bran, barley, legumes (eg, beans and peas), apples, and strawberries. · Insoluble fiber speeds digestion and can add bulk to the stool. Examples of foods that are high in insoluble fiber include whole-wheat products, wheat bran, cauliflower, green beans, and potatoes. Why Follow a High-Fiber Diet? A high-fiber diet is often recommended to prevent and treat constipation , hemorrhoids , diverticulitis , and irritable bowel syndrome . Eating a high-fiber diet can also help improve your cholesterol levels, lower your risk of coronary heart disease , reduce your risk of type 2 diabetes , and lower your weight. For people with type 1 or 2 diabetes, a high-fiber diet can also help stabilize blood sugar levels. How Much Fiber Should I Eat? A high-fiber diet should contain  20-35 grams  of fiber a day. This is actually the amount recommended for the general adult population; however, most Americans eat only 15 grams of fiber per day. Digestion of Fiber   Eating a higher fiber diet than usual can take some getting used to by your body's digestive system. To avoid the side effects of sudden increases in dietary fiber (eg, gas, cramping, bloating, and diarrhea), increase fiber gradually and be sure to drink plenty of fluids every day. Tips for Increasing Fiber Intake   · Whenever possible, choose whole grains over refined grains (eg, brown rice instead of white rice, whole-wheat bread instead of white bread). · Include a variety of grains in your diet, such as wheat, rye, barley, oats, quinoa, and bulgur. · Eat more vegetarian-based meals. Here are some ideas: black bean burgers, eggplant lasagna, and veggie tofu stir-flores. · Choose high-fiber snacks, such as fruits, popcorn, whole-grain crackers, and nuts. · Make whole-grain cereal or whole-grain toast part of your daily breakfast regime. · When eating out, whether ordering a sandwich or dinner, ask for extra vegetables. · When baking, replace part of the white flour with whole-wheat flour. Whole-wheat flour is particularly easy to incorporate into a recipe. High-Fiber Diet Eating Guide   Food Category   Foods Recommended   Notes   Grains   Whole-grain breads, muffins, bagels, or kamini bread Rye bread Whole-wheat crackers or crisp breads Whole-grain or bran cereals Oatmeal, oat bran, or grits Wheat germ Whole-wheat pasta and brown rice   Read the ingredients list on food labels. Look for products that list \"whole\" as the first ingredient (eg, whole-wheat, whole oats). Choose cereals with at least 2 grams of fiber per serving. Vegetables   All vegetables, especially asparagus, bean sprouts, broccoli, [place] sprouts, cabbage, carrots, cauliflower, celery, corn, greens, green beans, green pepper, onions, peas, potatoes (with skin), snow peas, spinach, squash, sweet potatoes, tomatoes, zucchini   For maximum fiber intake, eat the peels of fruits and vegetablesjust be sure to wash them well first.   Fruits   All fruits, especially apples, berries, grapefruits, mangoes, nectarines, oranges, peaches, pears, dried fruits (figs, dates, prunes, raisins)   Choose raw fruits and vegetables over juice, cooked, or cannedraw fruit has more fiber. Dried fruit is also a good source of fiber. Milk   With the exception of yogurt containing inulin (a type of fiber), dairy foods provide little fiber. Add more fiber by topping your yogurt or cottage cheese with fresh fruit, whole grain or bran cereals, nuts, or seeds.    Meats and Beans All beans and peas, especially Garbanzo beans, kidney beans, lentils, lima beans, split peas, and abraham beans All nuts and seeds, especially almonds, peanuts, [place] nuts, cashews, peanut butter, walnuts, sesame and sunflower seeds All meat, poultry, fish, and eggs   Increase fiber in meat dishes by adding abraham beans, kidney beans, black-eyed peas, bran, or oatmeal. If you are following a low-fat diet, use nuts and seeds only in moderation. Fats and Oils   All in moderation   Fats and oils do not provide fiber   Snacks, Sweets, and Condiments   Fruit Nuts Popcorn, whole-wheat pretzels, or trail mix made with dried fruits, nuts, and seeds Cakes, breads, and cookies made with oatmeal or whole-wheat flour   Most snack foods do not provide much fiber. Choose snacks with at least 2 grams of fiber per serving. Last Reviewed: March 2011 Pura Gibbs MS, MPH, RD   Updated: 3/29/2011     Smoking Cessation  This document explains the best ways for you to quit smoking and new treatments to help. It lists new medicines that can double or triple your chances of quitting and quitting for good. It also considers ways to avoid relapses and concerns you may have about quitting, including weight gain. NICOTINE: A POWERFUL ADDICTION  If you have tried to quit smoking, you know how hard it can be. It is hard because nicotine is a very addictive drug. For some people, it can be as addictive as heroin or cocaine. Usually, people make 2 or 3 tries, or more, before finally being able to quit. Each time you try to quit, you can learn about what helps and what hurts. Quitting takes hard work and a lot of effort, but you can quit smoking. QUITTING SMOKING IS ONE OF THE MOST IMPORTANT THINGS YOU WILL EVER DO.  · You will live longer, feel better, and live better. · The impact on your body of quitting smoking is felt almost immediately:  · Within 20 minutes, blood pressure decreases. Pulse returns to its normal level. · After 8 hours, carbon monoxide levels in the blood return to normal. Oxygen level increases. · After 24 hours, chance of heart attack starts to decrease. Breath, hair, and body stop smelling like smoke. · After 48 hours, damaged nerve endings begin to recover. Sense of taste and smell improve. · After 72 hours, the body is virtually free of nicotine. Bronchial tubes relax and breathing becomes easier. · After 2 to 12 weeks, lungs can hold more air. Exercise becomes easier and circulation improves. · Quitting will reduce your risk of having a heart attack, stroke, cancer, or lung disease:  · After 1 year, the risk of coronary heart disease is cut in half. · After 5 years, the risk of stroke falls to the same as a nonsmoker. · After 10 years, the risk of lung cancer is cut in half and the risk of other cancers decreases significantly. · After 15 years, the risk of coronary heart disease drops, usually to the level of a nonsmoker. · If you are pregnant, quitting smoking will improve your chances of having a healthy baby. · The people you live with, especially your children, will be healthier. · You will have extra money to spend on things other than cigarettes. FIVE KEYS TO QUITTING  Studies have shown that these 5 steps will help you quit smoking and quit for good. You have the best chances of quitting if you use them together:  1. Get ready. 2. Get support and encouragement. 3. Learn new skills and behaviors. 4. Get medicine to reduce your nicotine addiction and use it correctly. 5. Be prepared for relapse or difficult situations. Be determined to continue trying to quit, even if you do not succeed at first.  1. GET READY  · Set a quit date. · Change your environment. · Get rid of ALL cigarettes, ashtrays, matches, and lighters in your home, car, and place of work. · Do not let people smoke in your home. · Review your past attempts to quit. Think about what worked and what did not. · Once you quit, do not smoke. NOT EVEN A PUFF! 2. GET SUPPORT AND ENCOURAGEMENT  Studies have shown that you have a better chance of being successful if you have help. You can get support in many ways. · Tell your family, friends, and coworkers that you are going to quit and need their support. Ask them not to smoke around you. · Talk to your caregivers (doctor, dentist, nurse, pharmacist, psychologist, and/or smoking counselor). · Get individual, group, or telephone counseling and support. The more counseling you have, the better your chances are of quitting. Programs are available at [facility] [place]. Call your local health department for information about programs in your area. · Spiritual beliefs and practices may help some smokers quit. · Quit meters are small computer programs online or downloadable that keep track of quit statistics, such as amount of \"quit-time,\" cigarettes not smoked, and money saved. · Many smokers find one or more of the many self-help books available useful in helping them quit and stay off tobacco.  3. LEARN NEW SKILLS AND BEHAVIORS  · Try to distract yourself from urges to smoke. Talk to someone, go for a walk, or occupy your time with a task. · When you first try to quit, change your routine. Take a different route to work. Drink tea instead of coffee. Eat breakfast in a different place. · Do something to reduce your stress. Take a hot bath, exercise, or read a book. · Plan something enjoyable to do every day. Reward yourself for not smoking. · Explore interactive web-based programs that specialize in helping you quit. 4. GET MEDICINE AND USE IT CORRECTLY  Medicines can help you stop smoking and decrease the urge to smoke. Combining medicine with the above behavioral methods and support can quadruple your chances of successfully quitting smoking. 5. BE PREPARED FOR RELAPSE OR DIFFICULT SITUATIONS  · Most relapses occur within the first 3 months after quitting. Do not be discouraged if you start smoking again. Remember, most people try several times before they finally quit. · You may have symptoms of withdrawal because your body is used to nicotine. You may crave cigarettes, be irritable, feel very hungry, cough often, get headaches, or have difficulty concentrating. · The withdrawal symptoms are only temporary. They are strongest when you first quit, but they will go away within 10 to 14 days. Here are some difficult situations to watch for:  · Alcohol. Avoid drinking alcohol. Drinking lowers your chances of successfully quitting. · Caffeine. Try to reduce the amount of caffeine you consume. It also lowers your chances of successfully quitting. · Other smokers. Being around smoking can make you want to smoke. Avoid smokers. · Weight gain. Many smokers will gain weight when they quit, usually less than 10 pounds. Eat a healthy diet and stay active. Do not let weight gain distract you from your main goal, quitting smoking. Some medicines that help you quit smoking may also help delay weight gain. You can always lose the weight gained after you quit. · Bad mood or depression. There are a lot of ways to improve your mood other than smoking. If you are having problems with any of these situations, talk to your caregiver. SPECIAL SITUATIONS AND CONDITIONS  Studies suggest that everyone can quit smoking. Your situation or condition can give you a special reason to quit. · Pregnant women/new mothers: By quitting, you protect your baby's health and your own. · Hospitalized patients: By quitting, you reduce health problems and help healing. · Heart attack patients: By quitting, you reduce your risk of a second heart attack. · Lung, head, and neck cancer patients: By quitting, you reduce your chance of a second cancer. · Parents of children and adolescents: By quitting, you protect your children from illnesses caused by secondhand smoke. QUESTIONS TO THINK ABOUT  Think about the following questions before you try to stop smoking. You may want to talk about your answers with your caregiver. · Why do you want to quit? · If you tried to quit in the past, what helped and what did not? · What will be the most difficult situations for you after you quit? How will you plan to handle them? · Who can help you through the tough times? Your family? Friends? Caregiver? · What pleasures do you get from smoking? What ways can you still get pleasure if you quit? Here are some questions to ask your caregiver:  · How can you help me to be successful at quitting? · What medicine do you think would be best for me and how should I take it? · What should I do if I need more help? · What is smoking withdrawal like? How can I get information on withdrawal?  Quitting takes hard work and a lot of effort, but you can quit smoking. FOR MORE INFORMATION   Smokefree. gov (PortableGrid.se) provides free, accurate, evidence-based information and professional assistance to help support the immediate and long-term needs of people trying to quit smoking. Document Released: 12/12/2002 Document Revised: 12/06/2012 Document Reviewed: 10/04/2010  CHANA E. BET[employer] [facility] Patient Information ©2012 Lai Orellana. Keeping Home a [facility]       As we get older, changes in balance, gait, strength, vision, hearing, and cognition make even the most youthful senior more prone to accidents. Falls are one of the leading health risks for older people.  This increased risk of falling is related to:   · Aging process (eg, decreased muscle strength, slowed reflexes)   · Higher incidence of chronic health problems (eg, arthritis, diabetes) that may limit mobility, agility or sensory awareness · Side effects of medicine (eg, dizziness, blurred vision)especially medicines like prescription pain medicines and drugs used to treat mental health conditions   Depending on the brittleness of your bones, the consequences of a fall can be serious and long lasting. Home Life   Research by the Association of Aging [facility]) shows that some home accidents among older adults can be prevented by making simple lifestyle changes and basic modifications and repairs to the home environment. Here are some lifestyle changes that experts recommend:   · Have your hearing and vision checked regularly. Be sure to wear prescription glasses that are right for you. · Speak to your doctor or pharmacist about the possible side effects of your medicines. A number of medicines can cause dizziness. · If you have problems with sleep, talk to your doctor. · Limit your intake of alcohol. · If necessary, use a cane or walker to help maintain your balance. · Wear supportive, rubber-soled shoes, even at home. If you live in a region that gets wintry weather, you may want to put special cleats on your shoes to prevent you from slipping on the snow and ice. · Exercise regularly to help maintain muscle tone, agility, and balance. · Always hold the banister when going up or down stairs. Also, use  bars when getting in or out of the bath or shower, or using the toilet. · To avoid dizziness, get up slowly from a lying down position. Sit up first, dangling your legs for a minute or two before rising to a standing position. Overall Home Safety Check   According to the Consumer Product Safety Commision's \"Older Consumer Home Safety Checklist,\" it is important to check for potential hazards in each room. And remember, proper lighting is an essential factor in home safety. If you cannot see clearly, you are more likely to fall.    Important questions to ask yourself include: · Look for coffee pots, kettles and toaster ovens with automatic shut-offs. · Keep a mop handy in the kitchen so you can wipe up spills instantly. You should also have a small fire extinguisher. · Arrange your kitchen with frequently used items on lower shelves to avoid the need to stand on a stepstool to reach them. · Make sure countertops are well-lit to avoid injuries while cutting and preparing food. In the Bathroom    · Use a non-slip mat or decals in the tub and shower, since wet, soapy tile or porcelain surfaces are extremely slippery. · Make sure bathroom rugs are non-skid or tape them firmly to the floor. Bathtubs should have at least one, preferably two, grab bars, firmly attached to structural supports in the wall. (Do not use built-in soap holders or glass shower doors as grab bars.)    · Tub seats fitted with non-slip material on the legs allow you to wash sitting down. For people with limited mobility, bathtub transfer benches allow you to slide safely into the tub. · Raised toilet seats and toilet safety rails are helpful for those with knee or hip problems. In the Bedroom    · Make sure you use a nightlight and that the area around your bed is clear of potential obstacles. · Be careful with electric blankets and never go to sleep with a heating pad, which can cause serious burns even if on a low setting. · Use fire-resistant mattress covers and pillows, and NEVER smoke in bed. · Keep a phone next to the bed that is programmed to dial 911 at the push of a button. If you have a chronic condition, you may want to sign on with an automatic call-in service. Typically the system includes a small pendant that connects directly to an emergency medical voice-response system. You should also make arrangements to stay in contact with someonefriend, neighbor, family memberon a regular schedule.    Fire Prevention According to the National S.A.F.E. (Smoke Alarms for Every) [address] [facility], senior citizens are one of the two highest risk groups for death and serious injuries due to residential fires. · When cooking, wear short-sleeved items, never a bulky long-sleeved robe. · The [facility]'s Safety Checklist for Older Consumers emphasizes the importance of checking basements, garages, workshops and storage areas for fire hazards, such as volatile liquids, piles of old rags or clothing and overloaded circuits. · Never smoke in bed or when lying down on a couch or recliner chair. · Small portable electric or kerosene heaters are responsible for many home fires and should be used cautiously if at all. If you do use one, be sure to keep them away from flammable materials. · In case of fire, make sure you have a pre-established emergency exit plan. · Have a professional check your fireplace and other fuel-burning appliances yearly. Helping Hands   Baby boomers entering the briggs years will continue to see the development of new products to help older adults live safely and independently in spite of age-related changes. Making Life More Livable  , by Sinan Vela, lists over 1,000 products for \"living well in the mature years,\" such as bathing and mobility aids, household security devices, ergonomically designed knives and peelers, and faucet valves and knobs for temperature control. Medical supply stores and organizations are good sources of information about products that improve your quality of life and insure your safety.      Last Reviewed: November 2009 Celia Eli MD   Updated: 3/7/2011            Advance Care Planning: Care Instructions  Your Care Instructions It can be hard to live with an illness that cannot be cured. But if your health is getting worse, you may want to make decisions about end-of-life care. Planning for the end of your life does not mean that you are giving up. It is a way to make sure that your wishes are met. Clearly stating your wishes can make it easier for your loved ones. Making plans while you are still able may also ease your mind and make your final days less stressful and more meaningful. Follow-up care is a key part of your treatment and safety. Be sure to make and go to all appointments, and call your doctor if you are having problems. It's also a good idea to know your test results and keep a list of the medicines you take. What can you do to plan for the end of life? · You can bring these issues up with your doctor. You do not need to wait until your doctor starts the conversation. You might start with \"I would not be willing to live with . [facility] [facility] \" When you complete this sentence it helps your doctor understand your wishes. · Talk openly and honestly with your doctor. This is the best way to understand the decisions you will need to make as your health changes. Know that you can always change your mind. · Ask your doctor about commonly used life-support measures. These include tube feedings, breathing machines, and fluids given through a vein (IV). Understanding these treatments will help you decide whether you want them. · You may choose to have these life-supporting treatments for a limited time. This allows a trial period to see whether they will help you. You may also decide that you want your doctor to take only certain measures to keep you alive. It is important to spell out these conditions so that your doctor and family understand them. · Talk to your doctor about how long you are likely to live. He or she may be able to give you an idea of what usually happens with your specific illness. · Think about preparing papers that state your wishes. This way there will not be any confusion about what you want. You can change your instructions at any time. Which papers should you prepare? Advance directives are legal papers that tell doctors how you want to be cared for at the end of your life. You do not need a  to write these papers. Ask your doctor or your state health department for information on how to write your advance directives. They may have the forms for each of these types of papers. Make sure your doctor has a copy of these on file, and give a copy to a family member or close friend. · Consider a do-not-resuscitate order (DNR). This order asks that no extra treatments be done if your heart stops or you stop breathing. Extra treatments may include cardiopulmonary resuscitation (CPR), electrical shock to restart your heart, or a machine to breathe for you. If you decide to have a DNR order, ask your doctor to explain and write it. Place the order in your home where everyone can easily see it. · Consider a living will. A living will explains your wishes about life support and other treatments at the end of your life if you become unable to speak for yourself. Living krishnamurthy tell doctors to use or not use treatments that would keep you alive. You must have one or two witnesses or a notary present when you sign this form. A living will may be called something else in your state. · Consider a medical power of . This form allows you to name a person to make decisions about your care if you are not able to. Most people ask a close friend or family member. Talk to this person about the kinds of treatments you want and those that you do not want. Make sure this person understands your wishes. A medical power of  may be called something else in your state. These legal papers are simple to change. Tell your doctor what you want to change, and ask him or her to make a note in your medical file. Give your family updated copies of the papers. Where can you learn more? Go to https://chpeeldaewtana.[employer]. org and sign in to your CanoP account. Enter P184 in the [employer] box to learn more about \"Advance Care Planning: Care Instructions. \"     If you do not have an account, please click on the \"Sign Up Now\" link. Current as of: December 9, 2019               Content Version: 12.6  © 9404-3558 [employer]. Care instructions adapted under license by [facility] ([facility]). If you have questions about a medical condition or this instruction, always ask your healthcare professional. [address] any warranty or liability for your use of this information. Learning About Living Lopez Isaac  What is a living will? A living will, also called a declaration, is a legal form. It tells your family and your doctor your wishes when you can't speak for yourself. It's used by the health professionals who will treat you as you near the end of your life or if you get seriously hurt or ill. If you put your wishes in writing, your loved ones and others will know what kind of care you want. They won't need to guess. This can ease your mind and be helpful to others. And you can change or cancel your living will at any time. A living will is not the same as an estate or property will. An estate will explains what you want to happen with your money and property after you die. How do you use it? A living will is used to describe the kinds of treatment or life support you want as you near the end of your life or if you get seriously hurt or ill. Keep these facts in mind about living krishnamurthy. · It's a good idea to get your living will notarized. This means using a person called a  to watch two people sign, or witness, your living will. What should you know when you create a living will? Here are some questions to ask yourself as you make your living will:  · Do you know enough about life support methods that might be used? If not, talk to your doctor so you know what might be done if you can't breathe on your own, your heart stops, or you can't swallow. · What things would you still want to be able to do after you receive life-support methods? Would you want to be able to walk? To speak? To eat on your own? To live without the help of machines? · Do you want certain Shinto practices performed if you become very ill? · If you have a choice, where do you want to be cared for? In your home? At a hospital or nursing home? · If you have a choice at the end of your life, where would you prefer to die? At home? In a hospital or nursing home? Somewhere else? · Would you prefer to be buried or cremated? · Do you want your organs to be donated after you die? What should you do with your living will? · Make sure that your family members and your health care agent have copies of your living will (also called a declaration). · Give your doctor a copy of your living will. Ask him or her to keep it as part of your medical record. If you have more than one doctor, make sure that each one has a copy. · Put a copy of your living will where it can be easily found. For example, some people may put a copy on their refrigerator door. If you are using a digital copy, be sure your doctor, family members, and health care agent know how to find and access it. Where can you learn more? Go to https://chdaniaeb.[employer]. org and sign in to your [employer] account. Enter D238 in the [employer] box to learn more about \"Learning About Living [place]. \" If you do not have an account, please click on the \"Sign Up Now\" link. Current as of: December 9, 2019               Content Version: 12.6  © 3116-0934 [employer]. Care instructions adapted under license by [facility] ([facility]). If you have questions about a medical condition or this instruction, always ask your healthcare professional. [address] any warranty or liability for your use of this information. Learning About Medical Power of   What is a medical power of ? A medical power of , also called a durable power of  for health care, is one type of the legal forms called advance directives. It lets you name the person you want to make treatment decisions for you if you can't speak or decide for yourself. The person you choose is called your health care agent. This person is also called a health care proxy or health care surrogate. A medical power of  may be called something else in your state. How do you choose a health care agent? Choose your health care agent carefully. This person may or may not be a family member. Talk to the person before you make your final decision. Make sure he or she is comfortable with this responsibility. It's a good idea to choose someone who:  · Is at least 25years old. · Knows you well and understands what makes life meaningful for you. · Understands your Jehovah's witness and moral values. · Will do what you want, not what he or she wants. · Will be able to make difficult choices at a stressful time. · Will be able to refuse or stop treatment, if that is what you would want, even if you could die. · Will be firm and confident with health professionals if needed. · Will ask questions to get needed information. · Lives near you or agrees to travel to you if needed. Your family may help you make medical decisions while you can still be part of that process. But it's important to choose one person to be your health care agent in case you aren't able to make decisions for yourself. If you don't fill out the legal form and name a health care agent, the decisions your family can make may be limited. A health care agent may be called something else in your state. Who will make decisions for you if you don't have a health care agent? If you don't have a health care agent or a living will, you may not get the care you want. Decisions may be made by family members who disagree about your medical care. Or decisions may be made by a medical professional who doesn't know you well. In some cases, a  makes the decisions. When you name a health care agent, it is very clear who has the power to make health decisions for you. How do you name a health care agent? You name your health care agent on a legal form. This form is usually called a medical power of . Ask your hospital, state bar association, or office on aging where to find these forms. You must sign the form to make it legal. Some states require you to get the form notarized. This means that a person called a  watches you sign the form and then he or she signs the form. Some states also require that two or more witnesses sign the form. Be sure to tell your family members and doctors who your health care agent is. Where can you learn more? Go to https://chpeeldaewtana.[email or web address] org and sign in to your DailyTicket account. Enter [id number] in the [facility] box to learn more about \"Learning About [address]. \"     If you do not have an account, please click on the \"Sign Up Now\" link. Current as of: December 9, 2019               Content Version: 12.6  © 8101-0657 [employer]. Care instructions adapted under license by [facility] ([facility]). If you have questions about a medical condition or this instruction, always ask your healthcare professional. [address] any warranty or liability for your use of this information.

## 2020-12-21 NOTE — PROGRESS NOTES
Naproxen Sodium (ALEVE) 220 MG CAPS Take by mouth  Historical Provider, MD   empagliflozin (JARDIANCE) 10 MG tablet Take 1 tablet by mouth daily  Aisha Bauer MD   blood glucose test strips (ONE TOUCH ULTRA TEST) strip TEST THREE TIMES DAILY  Aisha Bauer MD   vitamin C (ASCORBIC ACID) 500 MG tablet Take 1,000 mg by mouth daily  Historical Provider, MD       Past Medical History:   Diagnosis Date    Acute pain of right shoulder 10/20/2020    Adult celiac disease 2013    Cellulitis of right foot 8/28/2017    COPD [facility]ch[facility] obstructive pulmonary disease) ([facility])     Deformity of toe of left foot     Diverticulosis of colon     Hernia, abdominal     Lung disease     Memory loss or impairment 2016    Personal history of testicular cancer     S/P colonoscopy with polypectomy 2013    DUE 2018    Tubular adenoma of colon 2013    Dr Zoltan Phipps    Vitamin D deficiency 2014       Past Surgical History:   Procedure Laterality Date    CARPAL TUNNEL RELEASE Right 2013    Dr Estuardo Bush Left     Dr Mariel Child History   Family history unknown: Yes       CareTeam (Including outside providers/suppliers regularly involved in providing care):   Patient Care Team:  Aisha Bauer MD as PCP - General (Family Medicine)  Aisha Bauer MD as PCP - [facility] Empaneled Provider  Jazzy Damon MD    Wt Readings from Last 3 Encounters:   12/14/20 117 lb (53.1 kg)   10/20/20 122 lb (55.3 kg)   01/31/20 126 lb 12.8 oz (57.5 kg)      Patient-Reported Vitals 12/21/2020   Patient-Reported Weight 120   Patient-Reported Height 5 3      There is no height or weight on file to calculate BMI. Based upon direct observation of the patient, evaluation of cognition reveals patient treated for dementia with Namenda. Able to conduct his ADLs and personal business relatively well with assist of a friend who lives in the same building. Memory actually seems to have improved over the last year. Patient's complete Health Risk Assessment and screening values have been reviewed and are found in Flowsheets. The following problems were reviewed today and where indicated follow up appointments were made and/or referrals ordered. Positive Risk Factor Screenings with Interventions:         Substance History:  Social History     Tobacco History     Smoking Status  Current Every Day Smoker Smoking Start Date  8/5/1957 Smoking Frequency  1 pack/day for 40 years (36 pk yrs) Smoking Tobacco Type  Cigarettes    Smokeless Tobacco Use  Never Used    Tobacco Comment  tried patches but got rash          Alcohol History     Alcohol Use Status  Not Asked          Drug Use     Drug Use Status  No          Sexual Activity     Sexually Active  Not Asked               Alcohol Screening:       A score of 8 or more is associated with harmful or hazardous drinking. A score of 13 or more in women, and 15 or more in men, is likely to indicate alcohol dependence. Substance Abuse Interventions:  · Tobacco abuse:  patient is not ready to work toward tobacco cessation at this time    [address] [facility] and ACP:  General  In general, how would you say your health is?: Good  In the past 7 days, have you experienced any of the following?  New or Increased Pain, New or Increased Fatigue, Loneliness, Social Isolation, Stress or Anger?: (!) New or Increased Pain  Do you get the social and emotional support that you need?: Yes  Do you have a Living Will?: (!) No  Advance Directives     Power of  Living Will ACP-Advance Directive ACP-Power of     Not on File Not on File Not on File Not on File      General Health Risk Interventions: · Pain issues: home exercises provided, acute, now resolving, right foot pain treated with abx and steroids by ER  · No Living Will: Advance Care Planning addressed with patient today    Health Habits/Nutrition:  Health Habits/Nutrition  Do you exercise for at least 20 minutes 2-3 times per week?: Yes  Have you lost any weight without trying in the past 3 months?: No  Do you eat fewer than 2 meals per day?: No  Have you seen a dentist within the past year?: (!) No     Health Habits/Nutrition Interventions:  · Inadequate physical activity:  educational materials provided to promote increased physical activity  · Dental exam overdue:  patient encouraged to make appointment with his/her dentist    Hearing/Vision:  No exam data present  Hearing/Vision  Do you or your family notice any trouble with your hearing?: (!) Yes  Do you have difficulty driving, watching TV, or doing any of your daily activities because of your eyesight?: No  Have you had an eye exam within the past year?: (!) No  Hearing/Vision Interventions:  · Vision concerns:  patient encouraged to make appointment with his/her eye specialist, ophthalmology/optometry referral provided    Safety:  Safety  Do you have working smoke detectors?: Yes  Have all throw rugs been removed or fastened?: (!) No  Do you have non-slip mats or surfaces in all bathtubs/showers?: Yes  Do all of your stairways have a railing or banister?: Yes  Are your doorways, halls and stairs free of clutter?: Yes  Do you always fasten your seatbelt when you are in a car?: Yes  Safety Interventions:  · Home safety tips provided    ADL:  ADLs  In the past 7 days, did you need help from others to perform any of the following everyday activities?  Eating, dressing, grooming, bathing, toileting, or walking/balance?: None Belkis Sena is a [de-identified] y.o. male being evaluated by a Virtual Visit (phone) encounter to address concerns as mentioned above. A caregiver was present when appropriate. Due to this being a TeleHealth encounter (During LQBSW-51 public health emergency), evaluation of the following organ systems was limited: Vitals/Constitutional/EENT/Resp/CV/GI//MS/Neuro/Skin/Heme-Lymph-Imm. Pursuant to the emergency declaration under the [address] [place] [address] and the [employer] and [employer] Act, this Virtual Visit was conducted with patient's (and/or legal guardian's) consent, to reduce the patient's risk of exposure to COVID-19 and provide necessary medical care. The patient (and/or legal guardian) has also been advised to contact this office for worsening conditions or problems, and seek emergency medical treatment and/or call 911 if deemed necessary. Patient identification was verified at the start of the visit: Yes    Services were provided through phone to substitute for in-person clinic visit. Patient and provider were located at their individual homes. --Hiwot Ghosh MD on 12/21/2020 at 10:49 AM    An electronic signature was used to authenticate this note.

## 2021-03-14 DIAGNOSIS — F02.80 LATE ONSET ALZHEIMER'S DISEASE WITHOUT BEHAVIORAL DISTURBANCE (HCC): Chronic | ICD-10-CM

## 2021-03-14 DIAGNOSIS — G30.1 LATE ONSET ALZHEIMER'S DISEASE WITHOUT BEHAVIORAL DISTURBANCE (HCC): Chronic | ICD-10-CM

## 2021-03-14 RX ORDER — MEMANTINE HYDROCHLORIDE 5 MG/1
TABLET ORAL
Qty: 180 TABLET | Refills: 1 | Status: SHIPPED | OUTPATIENT
Start: 2021-03-14

## 2021-03-14 NOTE — TELEPHONE ENCOUNTER
Rx request   Requested Prescriptions     Pending Prescriptions Disp Refills    memantine (NAMENDA) 5 MG tablet [Pharmacy Med Name: memantine 5 mg tablet] 180 tablet 1     Sig: TAKE 1 TABLET BY MOUTH TWICE DAILY     LOV 12/21/2020  Next Visit Date:  Future Appointments   Date Time Provider Tommy Cordon   3/23/2021 11:00 AM Melinda Boston MD [address] [place] [address]

## 2021-04-19 ENCOUNTER — VIRTUAL VISIT (OUTPATIENT)
Dept: FAMILY MEDICINE CLINIC | Age: 81
End: 2021-04-19
Payer: COMMERCIAL

## 2021-04-19 DIAGNOSIS — Z79.4 CONTROLLED TYPE 2 DIABETES MELLITUS WITH RIGHT EYE AFFECTED BY MILD NONPROLIFERATIVE RETINOPATHY AND MACULAR EDEMA, WITH LONG-TERM CURRENT USE OF INSULIN (HCC): ICD-10-CM

## 2021-04-19 DIAGNOSIS — I10 ESSENTIAL HYPERTENSION: Chronic | ICD-10-CM

## 2021-04-19 DIAGNOSIS — E11.3211 CONTROLLED TYPE 2 DIABETES MELLITUS WITH RIGHT EYE AFFECTED BY MILD NONPROLIFERATIVE RETINOPATHY AND MACULAR EDEMA, WITH LONG-TERM CURRENT USE OF INSULIN (HCC): ICD-10-CM

## 2021-04-19 DIAGNOSIS — M75.101 ROTATOR CUFF SYNDROME OF RIGHT SHOULDER: Chronic | ICD-10-CM

## 2021-04-19 DIAGNOSIS — E11.3211 CONTROLLED TYPE 2 DIABETES MELLITUS WITH RIGHT EYE AFFECTED BY MILD NONPROLIFERATIVE RETINOPATHY AND MACULAR EDEMA, WITH LONG-TERM CURRENT USE OF INSULIN (HCC): Primary | ICD-10-CM

## 2021-04-19 DIAGNOSIS — J44.9 CHRONIC OBSTRUCTIVE PULMONARY DISEASE, UNSPECIFIED COPD TYPE (HCC): ICD-10-CM

## 2021-04-19 DIAGNOSIS — I73.9 PERIPHERAL ARTERIAL DISEASE (HCC): ICD-10-CM

## 2021-04-19 DIAGNOSIS — Z79.4 CONTROLLED TYPE 2 DIABETES MELLITUS WITH RIGHT EYE AFFECTED BY MILD NONPROLIFERATIVE RETINOPATHY AND MACULAR EDEMA, WITH LONG-TERM CURRENT USE OF INSULIN (HCC): Primary | ICD-10-CM

## 2021-04-19 LAB
ALBUMIN SERPL-MCNC: 4.5 G/DL (ref 3.5–4.6)
ALP BLD-CCNC: 56 U/L (ref 35–104)
ALT SERPL-CCNC: 12 U/L (ref 0–41)
ANION GAP SERPL CALCULATED.3IONS-SCNC: 13 MEQ/L (ref 9–15)
AST SERPL-CCNC: 25 U/L (ref 0–40)
BASOPHILS ABSOLUTE: 0 K/UL (ref 0–0.2)
BASOPHILS RELATIVE PERCENT: 0.5 %
BILIRUB SERPL-MCNC: <0.2 MG/DL (ref 0.2–0.7)
BUN BLDV-MCNC: 25 MG/DL (ref 8–23)
CALCIUM SERPL-MCNC: 8.8 MG/DL (ref 8.5–9.9)
CHLORIDE BLD-SCNC: 98 MEQ/L (ref 95–107)
CO2: 25 MEQ/L (ref 20–31)
CREAT SERPL-MCNC: 1.66 MG/DL (ref 0.7–1.2)
EOSINOPHILS ABSOLUTE: 0.1 K/UL (ref 0–0.7)
EOSINOPHILS RELATIVE PERCENT: 1.7 %
GFR AFRICAN AMERICAN: 48.3
GFR NON-AFRICAN AMERICAN: 40
GLOBULIN: 2.7 G/DL (ref 2.3–3.5)
GLUCOSE BLD-MCNC: 156 MG/DL (ref 70–99)
HBA1C MFR BLD: 6.5 % (ref 4.8–5.9)
HCT VFR BLD CALC: 32.7 % (ref 42–52)
HEMOGLOBIN: 11 G/DL (ref 14–18)
LYMPHOCYTES ABSOLUTE: 1.1 K/UL (ref 1–4.8)
LYMPHOCYTES RELATIVE PERCENT: 17.9 %
MCH RBC QN AUTO: 31.9 PG (ref 27–31.3)
MCHC RBC AUTO-ENTMCNC: 33.7 % (ref 33–37)
MCV RBC AUTO: 94.5 FL (ref 80–100)
MONOCYTES ABSOLUTE: 0.5 K/UL (ref 0.2–0.8)
MONOCYTES RELATIVE PERCENT: 7.8 %
NEUTROPHILS ABSOLUTE: 4.5 K/UL (ref 1.4–6.5)
NEUTROPHILS RELATIVE PERCENT: 72.1 %
PDW BLD-RTO: 14 % (ref 11.5–14.5)
PLATELET # BLD: 163 K/UL (ref 130–400)
POTASSIUM SERPL-SCNC: 5.7 MEQ/L (ref 3.4–4.9)
RBC # BLD: 3.46 M/UL (ref 4.7–6.1)
SODIUM BLD-SCNC: 136 MEQ/L (ref 135–144)
TOTAL PROTEIN: 7.2 G/DL (ref 6.3–8)
WBC # BLD: 6.3 K/UL (ref 4.8–10.8)

## 2021-04-19 PROCEDURE — 4004F PT TOBACCO SCREEN RCVD TLK: CPT | Performed by: FAMILY MEDICINE

## 2021-04-19 PROCEDURE — 1123F ACP DISCUSS/DSCN MKR DOCD: CPT | Performed by: FAMILY MEDICINE

## 2021-04-19 PROCEDURE — G8419 CALC BMI OUT NRM PARAM NOF/U: HCPCS | Performed by: FAMILY MEDICINE

## 2021-04-19 PROCEDURE — 99214 OFFICE O/P EST MOD 30 MIN: CPT | Performed by: FAMILY MEDICINE

## 2021-04-19 PROCEDURE — 4040F PNEUMOC VAC/ADMIN/RCVD: CPT | Performed by: FAMILY MEDICINE

## 2021-04-19 PROCEDURE — G8427 DOCREV CUR MEDS BY ELIG CLIN: HCPCS | Performed by: FAMILY MEDICINE

## 2021-04-19 NOTE — PROGRESS NOTES
Subjective  Francisco Richardson, 80 y.o. male presents today with:  Chief Complaint   Patient presents with    Leg Swelling           HPI    Patient is here for f/u HTN/PAD. Is compliant with meds and has no side effects from them. Avoids added salt. Tries to eat healthy. Exercises occasionally. Has no chest pain, shortness of breath, palpitations. Has pain in calves with walking and has LLE>RLE edema and some rubor of RLE>LLE. Patient is here for DM f/u. Sugars have been moderately well-controlled and patient has not been experiencing hyper- or hypoglycemic symptoms. Compliance with meds is good and there are no side effects. Patient exercises occasionally and tries to eat healthy. Is up to date on foot and eye exams and immunizations. Complains of right diffuse shoulder pain, mild, nonradiating, worse with most movement though movement is not significantly  Restricted. No known injury.   No other questions and or concerns for today's visit    Past Medical History:   Diagnosis Date    Acute pain of right shoulder 10/20/2020    Adult celiac disease 2013    Bilateral leg edema 5/6/2021    Cellulitis of right foot 8/28/2017    COPD (chronic obstructive pulmonary disease) (HCC)     Deformity of toe of left foot     Diverticulosis of colon     Hernia, abdominal     Lung disease     Memory loss or impairment 2016    Personal history of testicular cancer     Rotator cuff syndrome of right shoulder 4/19/2021    S/P colonoscopy with polypectomy 2013    DUE 2018    Tubular adenoma of colon 2013    Dr Platt [place]    Vitamin D deficiency 2014     Past Surgical History:   Procedure Laterality Date    CARPAL TUNNEL RELEASE Right 2013    Dr Kari Segovia Left     Dr Sami Garner History     Socioeconomic History    Marital status:      Spouse name: Not on file    Number of children: 7    Years of education: Not on file    Highest education level: Not on file   Occupational History    Occupation: construction, retired   Tobacco Use    Smoking status: Current Every Day Smoker     Packs/day: 1.00     Years: 40.00     Pack years: 40.00     Types: Cigarettes     Start date: 8/5/1957    Smokeless tobacco: Never Used    Tobacco comment: tried patches but got rash   Vaping Use    Vaping Use: Never used   Substance and Sexual Activity    Alcohol use: Not on file    Drug use: No    Sexual activity: Not on file   Other Topics Concern    Not on file   Social History Narrative    Born in [place], one of 8    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[address], worked in construction    , 7 children, all live in [address] with girlfriend in an aprtment in [address] [place] [address] lad neighbor checks on him     [address] Strain:     Difficulty of Paying Living Expenses:    Food Insecurity:     Worried About [address] in the Last Year:    [address] in the Last Year:    Transportation Needs:     Lack of Transportation (Medical):  Lack of Transportation (Non-Medical):    Physical Activity:     Days of Exercise per Week:     Minutes of Exercise per Session:    Stress:     Feeling of Stress :    Social Connections:     Frequency of Communication with Friends and Family:     Frequency of Social Gatherings with Friends and Family:     Attends Yazdanism Services:     Active Member of Clubs or Organizations:     Attends Club or Organization Meetings:     Marital Status:    Intimate Partner Violence:     Fear of Current or Ex-Partner:     Emotionally Abused:     Physically Abused:     Sexually Abused:      Family History   Family history unknown:  Yes     Allergies   Allergen Reactions    Aricept [Donepezil Hydrochloride] Diarrhea    Exelon [Rivastigmine Tartrate] Rash and Other (See Comments)     burn    Vancomycin Rash     Current Outpatient Medications   Medication Sig Dispense Refill    furosemide (LASIX) 20 MG tablet Take 1 tablet by mouth daily 3 tablet 0    memantine (NAMENDA) 5 MG tablet TAKE 1 TABLET BY MOUTH TWICE DAILY 180 tablet 1    metFORMIN (GLUCOPHAGE) 1000 MG tablet TAKE 1 TABLET BY MOUTH TWICE DAILY WITH MEALS 180 tablet 4    vitamin B-12 (CYANOCOBALAMIN) 1000 MCG tablet Take 1 tablet by mouth daily 90 tablet 4    Cholecalciferol (VITAMIN D3) 50 MCG (2000 UT) CAPS 1 po daily with meal 90 capsule 4    empagliflozin (JARDIANCE) 10 MG tablet Take 1 tablet by mouth daily 90 tablet 4    blood glucose test strips (ONE TOUCH ULTRA TEST) strip TEST THREE TIMES DAILY 300 strip 5    albuterol sulfate  (90 Base) MCG/ACT inhaler Inhale 2 puffs into the lungs every 6 hours as needed for Wheezing 1 Inhaler 5    vitamin C (ASCORBIC ACID) 500 MG tablet Take 1,000 mg by mouth daily       No current facility-administered medications for this visit. PMH, Surgical Hx, Family Hx, and Social Hxreviewed and updated. Health Maintenance reviewed. Objective    There were no vitals filed for this visit. Physical Exam  Constitutional:       General: He is not in acute distress. Appearance: He is well-developed. HENT:      Head: Normocephalic and atraumatic. Eyes:      General: No scleral icterus. Conjunctiva/sclera: Conjunctivae normal.   Neck:      Thyroid: No thyromegaly. Vascular: No carotid bruit. Cardiovascular:      Rate and Rhythm: Normal rate and regular rhythm. Pulses:           Dorsalis pedis pulses are 0 on the right side and 0 on the left side. Heart sounds: Normal heart sounds, S1 normal and S2 normal.   Pulmonary:      Effort: Pulmonary effort is normal. No respiratory distress. Breath sounds: Normal breath sounds. No wheezing or rales. Abdominal:      General: Bowel sounds are normal. There is no distension. Palpations: Abdomen is soft. There is no mass. Tenderness: There is no abdominal tenderness. There is no guarding or rebound. Musculoskeletal:      Right shoulder: Normal.      Cervical back: Normal range of motion and neck supple. Right lower leg: No edema. Left lower leg: No edema. Lymphadenopathy:      Cervical: No cervical adenopathy. Skin:     General: Skin is warm and dry. Neurological:      Mental Status: He is alert and oriented to person, place, and time. 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, with long-term current use of insulin ([facility])    -  Primary    Comprehensive Metabolic Panel [48651 Custom]   - Future Order    Hemoglobin A1C [68957 Custom]   - Future Order         Essential hypertension        Comprehensive Metabolic Panel [03135 Custom]   - Future Order         Peripheral arterial disease ([facility] [address]        [address] Atif Brito DO, Cardiology, [place] [[id number] Custom]           Rotator cuff syndrome of right shoulder        RAIMUNDO - Mike Domingo MD, Orthopaedic Surgery [[id number] Custom]                   Reviewed with the patient: all disease processes, current clinical status, medications, activities and diet.      Side effects, adverse effects of the medication prescribed today, as well as treatment plan/ rationale and result expectations have been discussed with the patient who expresses understanding and desires to proceed.     Close follow up to evaluate treatment results and for coordination of care.   I have reviewed the patient's medical history in detail and updated the computerized patient record. More than 50% of the appointment was spent in face-to-face counseling, education and care coordination. Orders Placed This Encounter   Procedures    Comprehensive Metabolic Panel     Standing Status:   Future     Number of Occurrences:   1     Standing Expiration Date:   7/19/2021    Hemoglobin A1C     Standing Status:   Future     Number of Occurrences:   1     Standing Expiration Date:   6/19/2021    RAIMUNDO - Janes Barnett MD, Orthopaedic Surgery     Referral Priority:   Routine     Referral Type:   Eval and Treat     Referral Reason:   Specialty Services Required     Referred to Provider:   Nayeli Vuong MD     Requested Specialty:   Orthopedic Surgery     Number of Visits Requested:   [facility], [address] [place] [address], Cardiology, [place]     Referral Priority:   Routine     Referral Type:   Eval and Treat     Referral Reason:   Specialty Services Required     Referred to Provider:   Tab Ruvalcaba DO     Requested Specialty:   Cardiology     Number of Visits Requested:   1     No orders of the defined types were placed in this encounter. There are no discontinued medications. Return in about 3 months (around 7/19/2021) for CAD, DM, HTN - OV or VV. Controlled Substance Monitoring:    Acute and Chronic Pain Monitoring:   No flowsheet data found.         Enoc Carrasco MD

## 2021-04-20 DIAGNOSIS — N18.32 STAGE 3B CHRONIC KIDNEY DISEASE (HCC): Primary | ICD-10-CM

## 2021-05-04 ENCOUNTER — TELEPHONE (OUTPATIENT)
Dept: FAMILY MEDICINE CLINIC | Age: 81
End: 2021-05-04

## 2021-05-04 NOTE — TELEPHONE ENCOUNTER
Patient family member called in stating patients legs are swelling up. Patient does not want to see any other provider other than you. There are no face to face openings anytime soon and that is what patient is requesting.     Please advise and thank you

## 2021-05-06 ENCOUNTER — OFFICE VISIT (OUTPATIENT)
Dept: FAMILY MEDICINE CLINIC | Age: 81
End: 2021-05-06
Payer: COMMERCIAL

## 2021-05-06 VITALS
WEIGHT: 124.8 LBS | HEART RATE: 98 BPM | TEMPERATURE: 97.7 F | RESPIRATION RATE: 18 BRPM | DIASTOLIC BLOOD PRESSURE: 78 MMHG | OXYGEN SATURATION: 99 % | BODY MASS INDEX: 18.98 KG/M2 | SYSTOLIC BLOOD PRESSURE: 130 MMHG

## 2021-05-06 DIAGNOSIS — R60.0 BILATERAL LEG EDEMA: Chronic | ICD-10-CM

## 2021-05-06 DIAGNOSIS — I10 ESSENTIAL HYPERTENSION: ICD-10-CM

## 2021-05-06 DIAGNOSIS — I83.93 ASYMPTOMATIC VARICOSE VEINS OF BOTH LOWER EXTREMITIES: Primary | ICD-10-CM

## 2021-05-06 PROCEDURE — 4004F PT TOBACCO SCREEN RCVD TLK: CPT | Performed by: FAMILY MEDICINE

## 2021-05-06 PROCEDURE — G8420 CALC BMI NORM PARAMETERS: HCPCS | Performed by: FAMILY MEDICINE

## 2021-05-06 PROCEDURE — 4040F PNEUMOC VAC/ADMIN/RCVD: CPT | Performed by: FAMILY MEDICINE

## 2021-05-06 PROCEDURE — 99214 OFFICE O/P EST MOD 30 MIN: CPT | Performed by: FAMILY MEDICINE

## 2021-05-06 PROCEDURE — 1123F ACP DISCUSS/DSCN MKR DOCD: CPT | Performed by: FAMILY MEDICINE

## 2021-05-06 PROCEDURE — G8427 DOCREV CUR MEDS BY ELIG CLIN: HCPCS | Performed by: FAMILY MEDICINE

## 2021-05-06 RX ORDER — FUROSEMIDE 20 MG/1
20 TABLET ORAL DAILY
Qty: 3 TABLET | Refills: 0 | Status: SHIPPED | OUTPATIENT
Start: 2021-05-06 | End: 2021-06-09 | Stop reason: SDUPTHER

## 2021-05-06 ASSESSMENT — PATIENT HEALTH QUESTIONNAIRE - PHQ9
SUM OF ALL RESPONSES TO PHQ QUESTIONS 1-9: 1
1. LITTLE INTEREST OR PLEASURE IN DOING THINGS: 0
SUM OF ALL RESPONSES TO PHQ QUESTIONS 1-9: 1

## 2021-05-06 NOTE — PROGRESS NOTES
Subjective  Holger Guerrero, 80 y.o. male presents today with:  Chief Complaint   Patient presents with    Leg Swelling     right leg swelling           HPI    Holger has complaints of bilateral lower extremity edema for a few years. He was evaluated in the emergency department in 2018 and DVT studies at that time were negative. He was evaluated there this year and DVT studies were negative that as well. His BNP was low as well. He has no chest pain, shortness of breath, palpitations, paroxysmal nocturnal dyspnea, orthopnea. His legs do not hurt. He states that sometimes they get red and that the swelling is getting worse. He does try to elevate his legs when he is seated and he sleeps on his sofa with his feet on the arm.     No other questions and or concerns for today's visit          Past Medical History:   Diagnosis Date    Acute pain of right shoulder 10/20/2020    Adult celiac disease 2013    Bilateral leg edema 5/6/2021    Cellulitis of right foot 8/28/2017    COPD (chronic obstructive pulmonary disease) (HCC)     Deformity of toe of left foot     Diverticulosis of colon     Hernia, abdominal     Lung disease     Memory loss or impairment 2016    Personal history of testicular cancer     Rotator cuff syndrome of right shoulder 4/19/2021    S/P colonoscopy with polypectomy 2013    DUE 2018    Tubular adenoma of colon 2013    Dr Lord Patterson    Vitamin D deficiency 2014     Past Surgical History:   Procedure Laterality Date    CARPAL TUNNEL RELEASE Right 2013    Dr Chris Linda Left     Dr Tr Celestin History     Socioeconomic History    Marital status:      Spouse name: Not on file    Number of children: 7    Years of education: Not on file    Highest education level: Not on file   Occupational History    Occupation: construction, retired   Social Needs    [facility] insecurity     Worry: Not on file     Inability: Not on file    Transportation needs     Medical: Not on file     Non-medical: Not on file   Tobacco Use    Smoking status: Current Every Day Smoker     Packs/day: 1.00     Years: 40.00     Pack years: 40.00     Types: Cigarettes     Start date: 8/5/1957    Smokeless tobacco: Never Used    Tobacco comment: tried patches but got rash   Substance and Sexual Activity    Alcohol use: Not on file    Drug use: No    Sexual activity: Not on file   Lifestyle    Physical activity     Days per week: Not on file     Minutes per session: Not on file    Stress: Not on file   Relationships    Social connections     Talks on phone: Not on file     Gets together: Not on file     Attends Faith service: Not on file     Active member of club or organization: Not on file     Attends meetings of clubs or organizations: Not on file     Relationship status: Not on file    Intimate partner violence     Fear of current or ex partner: Not on file     Emotionally abused: Not on file     Physically abused: Not on file     Forced sexual activity: Not on file   Other Topics Concern    Not on file   Social History Narrative    Born in [place], one of 8    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[address],[address], worked in construction    , 7 children, all live in [address] with girlfriend in an aprtment in [address] [place] [address] neighbor checks on him     Family History   Family history unknown:  Yes     Allergies   Allergen Reactions    Aricept [Donepezil Hydrochloride] Diarrhea    Exelon [Rivastigmine Tartrate] Rash and Other (See Comments)     burn    Vancomycin Rash     Current Outpatient Medications   Medication Sig Dispense Refill    furosemide (LASIX) 20 MG tablet Take 1 tablet by mouth daily 3 tablet 0    memantine (NAMENDA) 5 MG tablet TAKE 1 TABLET BY MOUTH TWICE DAILY 180 tablet 1    metFORMIN (GLUCOPHAGE) 1000 MG tablet TAKE 1 TABLET BY MOUTH TWICE DAILY WITH MEALS 180 tablet 4    vitamin B-12 (CYANOCOBALAMIN) 1000 MCG tablet Take 1 tablet by mouth daily 90 tablet 4    Cholecalciferol (VITAMIN D3) 50 MCG (2000 UT) CAPS 1 po daily with meal 90 capsule 4    empagliflozin (JARDIANCE) 10 MG tablet Take 1 tablet by mouth daily 90 tablet 4    blood glucose test strips (ONE TOUCH ULTRA TEST) strip TEST THREE TIMES DAILY 300 strip 5    albuterol sulfate  (90 Base) MCG/ACT inhaler Inhale 2 puffs into the lungs every 6 hours as needed for Wheezing 1 Inhaler 5    vitamin C (ASCORBIC ACID) 500 MG tablet Take 1,000 mg by mouth daily       No current facility-administered medications for this visit. PMH, Surgical Hx, Family Hx, and Social Hxreviewed and updated. Health Maintenance reviewed. Objective    Vitals:    05/06/21 1235   BP: 130/78   Site: Right Upper Arm   Position: Sitting   Cuff Size: Medium Adult   Pulse: 98   Resp: 18   Temp: 97.7 °F (36.5 °C)   TempSrc: Tympanic   SpO2: 99%   Weight: 124 lb 12.8 oz (56.6 kg)        Physical Exam  Constitutional:       Appearance: He is well-developed. HENT:      Head: Normocephalic and atraumatic. Eyes:      Conjunctiva/sclera: Conjunctivae normal.   Neck:      Musculoskeletal: Normal range of motion and neck supple. Thyroid: No thyromegaly. Vascular: No carotid bruit. Cardiovascular:      Rate and Rhythm: Normal rate and regular rhythm. Pulses:           Dorsalis pedis pulses are 0 on the right side and 0 on the left side. Heart sounds: S1 normal and S2 normal.      Comments: Brisk cap refill  Pulmonary:      Effort: Pulmonary effort is normal.      Breath sounds: No wheezing or rales. Musculoskeletal:      Right lower leg: Edema ( tense posterior/calf edema and 2+ ankle edema) present. Left lower leg: Edema (1-2+ ankles) present. Skin:     General: Skin is warm and dry. Neurological:      Mental Status: He is alert and oriented to person, place, and time.    Psychiatric:         Mood and Affect: Mood normal. Behavior: Behavior normal.           Lab Results   Component Value Date    LABA1C 6.5 (H) 04/19/2021    LABA1C 8.6 10/20/2020    LABA1C 8.7 (H) 08/24/2020     Lab Results   Component Value Date    LABMICR 1.30 11/01/2014    CREATININE 1.66 (H) 04/19/2021     Lab Results   Component Value Date    ALT 12 04/19/2021    AST 25 04/19/2021     Lab Results   Component Value Date    CHOL 138 09/09/2017    TRIG 80 09/09/2017    HDL 47 09/09/2017    LDLCALC 75 09/09/2017        Assessment & Plan   Visit Diagnoses and Associated Orders     Asymptomatic varicose veins of both lower extremities    -  Primary    Suspect venous insufficiency is contributing to R>L LE edema. Urged elevation. Complete vascular evaluation prior to recommendation for compression stockings    Sonia Hunt NP, Interventional Radiology, [place] [[id number] Custom]           Bilateral leg edema        See above. For emotional and physical relief, Lasix for 3 days. furosemide (LASIX) 20 MG tablet [3294]           Essential hypertension        Blood pressure continues to be well controlled without medications. Ej Mcmillan was seen today for leg swelling. Diagnoses and all orders for this visit:    Asymptomatic varicose veins of both lower extremities  Comments:  Suspect venous insufficiency is contributing to R>L LE edema. Urged elevation. Complete vascular evaluation prior to recommendation for compression stockings  Orders:  -     Sonia Hunt NP, Interventional Radiology, [place]    Bilateral leg edema  Comments:  See above. For emotional and physical relief, Lasix for 3 days. Orders:  -     furosemide (LASIX) 20 MG tablet; Take 1 tablet by mouth daily    Essential hypertension  Comments:  Blood pressure continues to be well controlled without medications.         Reviewed with the patient: all disease processes, current clinical status, medications, activities and diet.      Side effects, adverse effects of the medication prescribed today, as well as treatment plan/ rationale and result expectations have been discussed with the patient who expresses understanding and desires to proceed.     Close follow up to evaluate treatment results and for coordination of care. I have reviewed the patient's medical history in detail and updated the computerized patient record. Please note this report has been partially produced using speech recognition software and may contain mistakes related to that system including errors in grammar, punctuation and spelling as well as words and phrases that may seem inappropriate. If there are questions or concerns, please feel free to contact me to clarify. Orders Placed This Encounter   Procedures   Teresa Tsai NP, Interventional Radiology, [place]     Referral Priority:   Routine     Referral Type:   Eval and Treat     Referral Reason:   Specialty Services Required     Referred to Provider:   MAYRA June - CNP     Requested Specialty:   Nurse Practitioner     Number of Visits Requested:   1     Orders Placed This Encounter   Medications    furosemide (LASIX) 20 MG tablet     Sig: Take 1 tablet by mouth daily     Dispense:  3 tablet     Refill:  0     There are no discontinued medications. Return for has f/u appt. Controlled Substance Monitoring:    Acute and Chronic Pain Monitoring:   No flowsheet data found.         Gracie Pena MD

## 2021-05-24 ENCOUNTER — APPOINTMENT (OUTPATIENT)
Dept: CT IMAGING | Age: 81
End: 2021-05-24
Payer: COMMERCIAL

## 2021-05-24 ENCOUNTER — HOSPITAL ENCOUNTER (EMERGENCY)
Age: 81
Discharge: HOME | End: 2021-05-24
Attending: EMERGENCY MEDICINE
Payer: COMMERCIAL

## 2021-05-24 VITALS
OXYGEN SATURATION: 99 % | RESPIRATION RATE: 18 BRPM | DIASTOLIC BLOOD PRESSURE: 61 MMHG | WEIGHT: 146 LBS | HEART RATE: 68 BPM | SYSTOLIC BLOOD PRESSURE: 142 MMHG | BODY MASS INDEX: 25.87 KG/M2 | HEIGHT: 63 IN | TEMPERATURE: 97.5 F

## 2021-05-24 DIAGNOSIS — R31.0 GROSS HEMATURIA: Primary | ICD-10-CM

## 2021-05-24 DIAGNOSIS — R16.0 LIVER MASSES: ICD-10-CM

## 2021-05-24 LAB
ALBUMIN SERPL-MCNC: 4.3 G/DL (ref 3.5–4.6)
ALP BLD-CCNC: 83 U/L (ref 35–104)
ALT SERPL-CCNC: <5 U/L (ref 0–41)
ANION GAP SERPL CALCULATED.3IONS-SCNC: 6 MEQ/L (ref 9–15)
AST SERPL-CCNC: 29 U/L (ref 0–40)
BACTERIA: NEGATIVE /HPF
BASOPHILS ABSOLUTE: 0 K/UL (ref 0–0.2)
BASOPHILS RELATIVE PERCENT: 0.5 %
BILIRUB SERPL-MCNC: <0.2 MG/DL (ref 0.2–0.7)
BILIRUBIN URINE: NEGATIVE
BLOOD, URINE: ABNORMAL
BUN BLDV-MCNC: 26 MG/DL (ref 8–23)
CALCIUM SERPL-MCNC: 8.9 MG/DL (ref 8.5–9.9)
CHLORIDE BLD-SCNC: 97 MEQ/L (ref 95–107)
CLARITY: CLEAR
CO2: 29 MEQ/L (ref 20–31)
COLOR: YELLOW
CREAT SERPL-MCNC: 1.55 MG/DL (ref 0.7–1.2)
EOSINOPHILS ABSOLUTE: 0.1 K/UL (ref 0–0.7)
EOSINOPHILS RELATIVE PERCENT: 1.9 %
EPITHELIAL CELLS, UA: ABNORMAL /HPF (ref 0–5)
GFR AFRICAN AMERICAN: 52.3
GFR NON-AFRICAN AMERICAN: 43.2
GLOBULIN: 2.4 G/DL (ref 2.3–3.5)
GLUCOSE BLD-MCNC: 290 MG/DL (ref 70–99)
GLUCOSE URINE: >=1000 MG/DL
HCT VFR BLD CALC: 30.9 % (ref 42–52)
HEMOGLOBIN: 10.4 G/DL (ref 14–18)
HYALINE CASTS: ABNORMAL /HPF (ref 0–5)
KETONES, URINE: NEGATIVE MG/DL
LEUKOCYTE ESTERASE, URINE: NEGATIVE
LYMPHOCYTES ABSOLUTE: 0.7 K/UL (ref 1–4.8)
LYMPHOCYTES RELATIVE PERCENT: 12 %
MCH RBC QN AUTO: 31.4 PG (ref 27–31.3)
MCHC RBC AUTO-ENTMCNC: 33.7 % (ref 33–37)
MCV RBC AUTO: 93.2 FL (ref 80–100)
MONOCYTES ABSOLUTE: 0.4 K/UL (ref 0.2–0.8)
MONOCYTES RELATIVE PERCENT: 7.4 %
NEUTROPHILS ABSOLUTE: 4.5 K/UL (ref 1.4–6.5)
NEUTROPHILS RELATIVE PERCENT: 78.2 %
NITRITE, URINE: NEGATIVE
PDW BLD-RTO: 14.2 % (ref 11.5–14.5)
PH UA: 6.5 (ref 5–9)
PLATELET # BLD: 140 K/UL (ref 130–400)
POTASSIUM SERPL-SCNC: 5.3 MEQ/L (ref 3.4–4.9)
PROTEIN UA: 30 MG/DL
RBC # BLD: 3.32 M/UL (ref 4.7–6.1)
RBC UA: >100 /HPF (ref 0–5)
SODIUM BLD-SCNC: 132 MEQ/L (ref 135–144)
SPECIFIC GRAVITY UA: 1.02 (ref 1–1.03)
TOTAL PROTEIN: 6.7 G/DL (ref 6.3–8)
URINE REFLEX TO CULTURE: ABNORMAL
UROBILINOGEN, URINE: 0.2 E.U./DL
WBC # BLD: 5.7 K/UL (ref 4.8–10.8)
WBC UA: ABNORMAL /HPF (ref 0–5)

## 2021-05-24 PROCEDURE — 85025 COMPLETE CBC W/AUTO DIFF WBC: CPT

## 2021-05-24 PROCEDURE — 87040 BLOOD CULTURE FOR BACTERIA: CPT

## 2021-05-24 PROCEDURE — 99285 EMERGENCY DEPT VISIT HI MDM: CPT

## 2021-05-24 PROCEDURE — 81001 URINALYSIS AUTO W/SCOPE: CPT

## 2021-05-24 PROCEDURE — 80053 COMPREHEN METABOLIC PANEL: CPT

## 2021-05-24 PROCEDURE — 36415 COLL VENOUS BLD VENIPUNCTURE: CPT

## 2021-05-24 PROCEDURE — 74176 CT ABD & PELVIS W/O CONTRAST: CPT

## 2021-05-24 ASSESSMENT — ENCOUNTER SYMPTOMS
SHORTNESS OF BREATH: 0
COLOR CHANGE: 0
ABDOMINAL DISTENTION: 0
EYE DISCHARGE: 0
VOMITING: 0
RHINORRHEA: 0
CONSTIPATION: 0
FACIAL SWELLING: 0
NAUSEA: 0
ABDOMINAL PAIN: 0
DIARRHEA: 0

## 2021-05-24 NOTE — ED TRIAGE NOTES
Pt c/o blood in his urine for the past week, denies trauma and dysuria, Pt is A&OX3, calm, ambulatory, afebrile, breathes are equal and unlabored

## 2021-05-24 NOTE — ED PROVIDER NOTES
[facility] ED  eMERGENCY dEPARTMENT eNCOUnter      Pt Name: Edson Reagan  MRN: [id number]  [place] [id number]  Date of evaluation: 5/24/2021  Provider: Mary Anne Ryan, [address] [place] [address]       Chief Complaint   Patient presents with    Hematuria     blood in urine x1wk         HISTORY OF PRESENT ILLNESS   (Location/Symptom, Timing/Onset,Context/Setting, Quality, Duration, Modifying Factors, Severity)  Note limiting factors. Edson Reagan is a 80 y.o. male who presents to the emergency department with a chief complaint of blood in his urine. He has noticed this before when he has had kidney stones. He has been having this for a week and has been trying home remedies like coconut water. He denies any back pain or bladder pain. He denies any disruption of his stream or trouble urinating otherwise. He history of prostate problems. HPI    NursingNotes were reviewed. REVIEW OF SYSTEMS    (2-9 systems for level 4, 10 or more for level 5)     Review of Systems   Constitutional: Negative for activity change, appetite change, fatigue and fever. HENT: Negative for congestion, facial swelling and rhinorrhea. Eyes: Negative for discharge. Respiratory: Negative for shortness of breath. Cardiovascular: Negative for chest pain. Gastrointestinal: Negative for abdominal distention, abdominal pain, constipation, diarrhea, nausea and vomiting. Endocrine: Negative for cold intolerance. Genitourinary: Positive for hematuria. Negative for decreased urine volume, difficulty urinating, discharge, dysuria, flank pain, frequency, genital sores, penile pain, penile swelling, scrotal swelling, testicular pain and urgency. Musculoskeletal: Negative for arthralgias and myalgias. Skin: Negative for color change. Allergic/Immunologic: Negative for environmental allergies. Neurological: Negative for dizziness and light-headedness. Hematological: Negative for adenopathy. Psychiatric/Behavioral: Negative for behavioral problems. Except as noted above the remainder of the review of systems was reviewed and negative.        PAST MEDICAL HISTORY     Past Medical History:   Diagnosis Date    Acute pain of right shoulder 10/20/2020    Adult celiac disease 2013    Bilateral leg edema 5/6/2021    Cellulitis of right foot 8/28/2017    COPD (chronic obstructive pulmonary disease) (HCC)     Deformity of toe of left foot     Diverticulosis of colon     Hernia, abdominal     Lung disease     Memory loss or impairment 2016    Personal history of testicular cancer     Rotator cuff syndrome of right shoulder 4/19/2021    S/P colonoscopy with polypectomy 2013    DUE 2018    Tubular adenoma of colon 2013    Dr Momo Roberts    Vitamin D deficiency 2014         SURGICALHISTORY       Past Surgical History:   Procedure Laterality Date    CARPAL TUNNEL RELEASE Right 2013    Dr Shipman Manual Left     Dr Carlena Bernheim       Previous Medications    ALBUTEROL SULFATE  (90 BASE) MCG/ACT INHALER    Inhale 2 puffs into the lungs every 6 hours as needed for Wheezing    BLOOD GLUCOSE TEST STRIPS (ONE TOUCH ULTRA TEST) STRIP    TEST THREE TIMES DAILY    CHOLECALCIFEROL (VITAMIN D3) 50 MCG (2000 UT) CAPS    1 po daily with meal    EMPAGLIFLOZIN (JARDIANCE) 10 MG TABLET    Take 1 tablet by mouth daily    FUROSEMIDE (LASIX) 20 MG TABLET    Take 1 tablet by mouth daily    MEMANTINE (NAMENDA) 5 MG TABLET    TAKE 1 TABLET BY MOUTH TWICE DAILY    METFORMIN (GLUCOPHAGE) 1000 MG TABLET    TAKE 1 TABLET BY MOUTH TWICE DAILY WITH MEALS    VITAMIN B-12 (CYANOCOBALAMIN) 1000 MCG TABLET    Take 1 tablet by mouth daily    VITAMIN C (ASCORBIC ACID) 500 MG TABLET    Take 1,000 mg by mouth daily       ALLERGIES     Aricept [donepezil hydrochloride], Exelon [rivastigmine tartrate], and Vancomycin    FAMILY HISTORY       Family History   Family history unknown: Yes          SOCIAL HISTORY       Social History     Socioeconomic History    Marital status:      Spouse name: None    Number of children: 7    Years of education: None    Highest education level: None   Occupational History    Occupation: construction, retired   Tobacco Use    Smoking status: Current Every Day Smoker     Packs/day: 1.00     Years: 40.00     Pack years: 40.00     Types: Cigarettes     Start date: 8/5/1957    Smokeless tobacco: Never Used    Tobacco comment: tried patches but got rash   Vaping Use    Vaping Use: Never used   Substance and Sexual Activity    Alcohol use: Not Currently     Alcohol/week: 0.0 standard drinks    Drug use: No    Sexual activity: None   Other Topics Concern    None   Social History Narrative    Born in [place], one of 8    Came to the [address] [place] [address],[address], worked in construction    , 7 children, all live in [address] with girlfriend in an aprtment in [address] [place] [address] lad neighbor checks on him     [address] [facility] [place] Strain:     Difficulty of Paying Living Expenses:    Food Insecurity:     Worried About [address] in the Last Year:    [address] in the Last Year:    Transportation Needs:     Lack of Transportation (Medical):      Lack of Transportation (Non-Medical):    Physical Activity:     Days of Exercise per Week:     Minutes of Exercise per Session:    Stress:     Feeling of Stress :    Social Connections:     Frequency of Communication with Friends and Family:     Frequency of Social Gatherings with Friends and Family:     Attends Che[employer] Services:     Active Member of Clubs or Organizations:     Attends Club or Organization Meetings:     Marital Status:    Intimate Partner Violence:     Fear of Current or Ex-Partner:     Emotionally Abused:     Physically Abused:     Sexually Abused:        SCREENINGS      @FLOW([id number])@      PHYSICAL EXAM    (up to 7 for level 4, 8 or more for level 5)     ED Triage Vitals [05/24/21 0927]   BP Temp Temp Source Pulse Resp SpO2 Height Weight   (!) 168/67 97.5 °F (36.4 °C) Temporal 97 17 99 % 5' 3\" (1.6 m) 146 lb (66.2 kg)       Physical Exam  Constitutional:       Appearance: He is well-developed. HENT:      Head: Normocephalic and atraumatic. Eyes:      Conjunctiva/sclera: Conjunctivae normal.      Pupils: Pupils are equal, round, and reactive to light. Cardiovascular:      Rate and Rhythm: Normal rate. Pulmonary:      Effort: Pulmonary effort is normal.   Abdominal:      General: Bowel sounds are normal.      Palpations: Abdomen is soft. Musculoskeletal:         General: Normal range of motion. Cervical back: Normal range of motion and neck supple. Skin:     General: Skin is warm and dry. Neurological:      Mental Status: He is alert and oriented to person, place, and time. Deep Tendon Reflexes: Reflexes are normal and symmetric. DIAGNOSTIC RESULTS     EKG: All EKG's are interpreted by the Emergency Department Physician who either signs or Co-signsthis chart in the absence of a cardiologist.        RADIOLOGY:   Gary Good such as CT, Ultrasound and MRI are read by the radiologist. Roanne Barthel radiographic images are visualized and preliminarily interpreted by the emergency physician with the below findings:        Interpretation per the Radiologist below, if available at the time ofthis note:    CT ABDOMEN PELVIS WO CONTRAST Additional Contrast? None   Final Result      There are multiple low density masses in the liver highly worrisome for metastases. The largest measures 6 cm in greatest diameter is located in the right lobe of the liver. The masses are incompletely characterized on noncontrast study. There is a 2 mm calcified stone in the gallbladder versus gallbladder wall calcification.       There is atrophy of the right kidney and moderate right hydronephrosis and hydroureter which is suggestive of chronic obstruction. No definite intrarenal stones or ureteral stones are identified on either side. ED BEDSIDE ULTRASOUND:   Performed by ED Physician - none    LABS:  Labs Reviewed   COMPREHENSIVE METABOLIC PANEL - Abnormal; Notable for the following components:       Result Value    Sodium 132 (*)     Potassium 5.3 (*)     Anion Gap 6 (*)     Glucose 290 (*)     BUN 26 (*)     CREATININE 1.55 (*)     GFR Non- 43.2 (*)     GFR  52.3 (*)     All other components within normal limits   CBC WITH AUTO DIFFERENTIAL - Abnormal; Notable for the following components:    RBC 3.32 (*)     Hemoglobin 10.4 (*)     Hematocrit 30.9 (*)     MCH 31.4 (*)     Lymphocytes Absolute 0.7 (*)     All other components within normal limits   URINE RT REFLEX TO CULTURE - Abnormal; Notable for the following components:    Glucose, Ur >=1000 (*)     Blood, Urine LARGE (*)     Protein, UA 30 (*)     All other components within normal limits   MICROSCOPIC URINALYSIS - Abnormal; Notable for the following components:    RBC, UA >100 (*)     All other components within normal limits   CULTURE, BLOOD 2   CULTURE, BLOOD 1       All other labs were within normal range or not returned as of this dictation. EMERGENCY DEPARTMENT COURSE and DIFFERENTIAL DIAGNOSIS/MDM:   Vitals:    Vitals:    05/24/21 1029 05/24/21 1129 05/24/21 1229 05/24/21 1230   BP: (!) 142/66 138/69 (!) 159/88 (!) 156/94   Pulse: 62 68 62    Resp: 20 18 20    Temp:       TempSrc:       SpO2: 99% 99% 99%    Weight:       Height:           Patient is [place]ijani-speaking and has some dementia so history taking is difficult however he does report via his family who is interpreting for him that he has been having blood in his urine for a week. His  History relays testicular cancer. He thinks he has prostate cancer history.   His scan shows concerns for liver mets and this could potentially be bladder cancer given the hematuria, with mets. He will be admitted for further workup. Hospitalist Dr. Nishi Dangelo is at bedside evaluating the patient in the situation with us and feels that the patient is comfortable for discharge and further work-up in this setting. Patient and his significant other are agreeable with this and the patient has established primary care. Signs and symptoms which should prompt a return to the emergency department in the interim are discussed and they are comfortable with discharge. MDM    CRITICAL CARE TIME   Total Critical Care time was 0 minutes, excluding separately reportableprocedures. There was a high probability of clinicallysignificant/life threatening deterioration in the patient's condition which required my urgent intervention. CONSULTS:  None    PROCEDURES:  Unless otherwise noted below, none     Procedures    FINAL IMPRESSION      1. Gross hematuria    2. Liver masses          DISPOSITION/PLAN   DISPOSITION Decision To Admit 05/24/2021 12:51:51 PM      PATIENT REFERRED TO:  No follow-up provider specified.     DISCHARGE MEDICATIONS:  New Prescriptions    No medications on file          (Please note that portions of this note were completed with a voice recognition program.  Efforts were made to edit the dictations but occasionally words are mis-transcribed.)    Evonnie Aase, DO (electronically signed)  Attending Emergency Physician          Evonnie Aase, DO  05/24/21 [facility], DO  05/24/21 [facility],   06/28/21 189

## 2021-05-24 NOTE — ED NOTES
Patient updated on care. No distress noted. Family at bedside.       Melania Vallejo RN  05/24/21 0215

## 2021-05-27 ENCOUNTER — OFFICE VISIT (OUTPATIENT)
Dept: FAMILY MEDICINE CLINIC | Age: 81
End: 2021-05-27
Payer: COMMERCIAL

## 2021-05-27 VITALS
DIASTOLIC BLOOD PRESSURE: 42 MMHG | RESPIRATION RATE: 24 BRPM | WEIGHT: 126 LBS | SYSTOLIC BLOOD PRESSURE: 102 MMHG | HEART RATE: 93 BPM | BODY MASS INDEX: 22.32 KG/M2 | TEMPERATURE: 97.2 F | OXYGEN SATURATION: 96 %

## 2021-05-27 DIAGNOSIS — R16.0 LIVER MASSES: Primary | ICD-10-CM

## 2021-05-27 DIAGNOSIS — R31.0 GROSS HEMATURIA: ICD-10-CM

## 2021-05-27 DIAGNOSIS — N13.30 HYDRONEPHROSIS, UNSPECIFIED HYDRONEPHROSIS TYPE: ICD-10-CM

## 2021-05-27 DIAGNOSIS — Z85.47 H/O TESTICULAR CANCER: ICD-10-CM

## 2021-05-27 PROCEDURE — G8427 DOCREV CUR MEDS BY ELIG CLIN: HCPCS | Performed by: FAMILY MEDICINE

## 2021-05-27 PROCEDURE — 4004F PT TOBACCO SCREEN RCVD TLK: CPT | Performed by: FAMILY MEDICINE

## 2021-05-27 PROCEDURE — 1123F ACP DISCUSS/DSCN MKR DOCD: CPT | Performed by: FAMILY MEDICINE

## 2021-05-27 PROCEDURE — 99214 OFFICE O/P EST MOD 30 MIN: CPT | Performed by: FAMILY MEDICINE

## 2021-05-27 PROCEDURE — G8420 CALC BMI NORM PARAMETERS: HCPCS | Performed by: FAMILY MEDICINE

## 2021-05-27 PROCEDURE — 4040F PNEUMOC VAC/ADMIN/RCVD: CPT | Performed by: FAMILY MEDICINE

## 2021-05-27 NOTE — PROGRESS NOTES
Subjective  Holger Joy [facility], 80 y.o. male presents today with:  Chief Complaint   Patient presents with    Hematuria     follow-up from [facility] ER on 5/24/21 Dx w/gross hematuria and liver masses           HPI    David Tellez recently presented to the emergency department complaining of gross hematuria for approximately 1 week. During evaluation, CT scan revealed multiple liver masses concerning for metastatic disease as well as one atrophic kidney and the other with hydronephrosis without obvious source of obstruction. His CMP and CBC were essentially unchanged. He was referred back to me for plan of care. Today, he states that the hematuria resolved 2 days ago. He has no dysuria or polyuria. He has no nausea, vomiting, diarrhea. No untoward bleeding events. No unexplained weight loss. His bilateral lower extremity edema has improved somewhat with furosemide.     No other questions and or concerns for today's visit          Past Medical History:   Diagnosis Date    Acute pain of right shoulder 10/20/2020    Adult celiac disease 2013    Bilateral leg edema 5/6/2021    Cellulitis of right foot 8/28/2017    COPD (chronic obstructive pulmonary disease) (HCC)     Deformity of toe of left foot     Diverticulosis of colon     Hernia, abdominal     Lung disease     Memory loss or impairment 2016    Personal history of testicular cancer     Rotator cuff syndrome of right shoulder 4/19/2021    S/P colonoscopy with polypectomy 2013    DUE 2018    Tubular adenoma of colon 2013    Dr Sunita Nava    Vitamin D deficiency 2014     Past Surgical History:   Procedure Laterality Date    CARPAL TUNNEL RELEASE Right 2013    Dr Steve Edwards Left     Dr Rosibel Arguelles History     Socioeconomic History    Marital status:      Spouse name: Not on file    Number of children: 7    Years of education: Not on file    Highest education level: Not on file (LASIX) 20 MG tablet Take 1 tablet by mouth daily 3 tablet 0    memantine (NAMENDA) 5 MG tablet TAKE 1 TABLET BY MOUTH TWICE DAILY 180 tablet 1    metFORMIN (GLUCOPHAGE) 1000 MG tablet TAKE 1 TABLET BY MOUTH TWICE DAILY WITH MEALS 180 tablet 4    vitamin B-12 (CYANOCOBALAMIN) 1000 MCG tablet Take 1 tablet by mouth daily 90 tablet 4    Cholecalciferol (VITAMIN D3) 50 MCG (2000 UT) CAPS 1 po daily with meal 90 capsule 4    empagliflozin (JARDIANCE) 10 MG tablet Take 1 tablet by mouth daily 90 tablet 4    blood glucose test strips (ONE TOUCH ULTRA TEST) strip TEST THREE TIMES DAILY 300 strip 5    albuterol sulfate  (90 Base) MCG/ACT inhaler Inhale 2 puffs into the lungs every 6 hours as needed for Wheezing 1 Inhaler 5    vitamin C (ASCORBIC ACID) 500 MG tablet Take 1,000 mg by mouth daily       No current facility-administered medications for this visit. PMH, Surgical Hx, Family Hx, and Social Hxreviewed and updated. Health Maintenance reviewed. Objective    Vitals:    05/27/21 1057 05/27/21 1100   BP: (!) 102/42 (!) 102/42   Site: Right Upper Arm Left Upper Arm   Position: Sitting Sitting   Cuff Size: Medium Adult Medium Adult   Pulse: 93    Resp: 24    Temp: 97.2 °F (36.2 °C)    TempSrc: Temporal    SpO2: 96%    Weight: 126 lb (57.2 kg)         Physical Exam  Constitutional:       Appearance: He is well-developed. HENT:      Head: Normocephalic and atraumatic. Eyes:      General: No scleral icterus. Conjunctiva/sclera: Conjunctivae normal.   Neck:      Thyroid: No thyromegaly. Vascular: No carotid bruit. Cardiovascular:      Rate and Rhythm: Normal rate and regular rhythm. Heart sounds: Normal heart sounds, S1 normal and S2 normal.   Pulmonary:      Effort: Pulmonary effort is normal.      Breath sounds: Normal breath sounds. No wheezing or rales. Abdominal:      General: Bowel sounds are normal. There is no distension. Palpations: Abdomen is soft. There is no mass. Tenderness: There is no abdominal tenderness. There is no guarding or rebound. Musculoskeletal:      Cervical back: Normal range of motion and neck supple. Skin:     General: Skin is warm and dry. Neurological:      Mental Status: He is alert and oriented to person, place, and time. Psychiatric:         Mood and Affect: Mood normal.         Behavior: Behavior normal.         Thought Content: Thought content normal.         Judgment: Judgment normal.      Comments: Dementia seems to be mild. Recall of ER visit relatively clear though understanding of current disease process requires repeated and consistent education. Lab Results   Component Value Date    LABA1C 6.5 (H) 04/19/2021    LABA1C 8.6 10/20/2020    LABA1C 8.7 (H) 08/24/2020     Lab Results   Component Value Date    LABMICR 1.30 11/01/2014    CREATININE 1.55 (H) 05/24/2021     Lab Results   Component Value Date    ALT <5 05/24/2021    AST 29 05/24/2021     Lab Results   Component Value Date    CHOL 138 09/09/2017    TRIG 80 09/09/2017    HDL 47 09/09/2017    LDLCALC 75 09/09/2017        Assessment & Plan   Visit Diagnoses and Associated Orders     Liver masses    -  Primary    Concerning for metastatic disease. Chest CT, diagnostic colonoscopy, urology consultation, labs, oncology consultation ordered and arranged by office. CT CHEST HIGH RESOLUTION [[id number] Custom]   - Future Order    [facility] - Mel Silveira MD, Urology, [address], Gastroenterology, Elvia Sosa MD, Oncology, [facility] Custom]           Gross hematuria        See above. Check coags. Daniel Pimentel MD, Urology, Elvia Sosa MD, Oncology, Leonela [[id number] Custom]      Protime-Inr [[id number] Custom]   - Future Order    Aptt [[id number] Custom]   - Future Order         Hydronephrosis, unspecified hydronephrosis type        See above.     [facility] Felicita Collins MD, Urology, Balbina Weaver MD, Oncology, [facility] Custom]           H/O testicular cancer        See above. Melvi Culver MD, Urology, Balbina Weaver MD, Oncology, [facility] Custom]                   Reviewed with the patient: all disease processes, current clinical status, medications, activities and diet.      Side effects, adverse effects of the medication prescribed today, as well as treatment plan/ rationale and result expectations have been discussed with the patient who expresses understanding and desires to proceed.     Close follow up to evaluate treatment results and for coordination of care. I have reviewed the patient's medical history in detail and updated the computerized patient record. Please note this report has been partially produced using speech recognition software and may contain mistakes related to that system including errors in grammar, punctuation and spelling as well as words and phrases that may seem inappropriate. If there are questions or concerns, please feel free to contact me to clarify. Orders Placed This Encounter   Procedures    CT CHEST HIGH RESOLUTION     Standing Status:   Future     Standing Expiration Date:   5/27/2022     Order Specific Question:   Reason for exam:     Answer:   liver masses    Protime-Inr     Standing Status:   Future     Standing Expiration Date:   5/27/2022     Order Specific Question:   Daily Coumadin Dose? Answer:   0    Aptt     Standing Status:   Future     Standing Expiration Date:   5/27/2022     Order Specific Question:   Daily Heparin Dose?      Answer:   Ashanti Martin MD, Urology, [place]     Referral Priority:   Routine     Referral Type:   Eval and Treat     Referral Reason:   Specialty Services Required     Referred to Provider:   Roopa Espinosa MD     Requested Specialty:   Urology     Number

## 2021-05-29 LAB
BLOOD CULTURE, ROUTINE: NORMAL
CULTURE, BLOOD 2: NORMAL

## 2021-06-01 ENCOUNTER — OFFICE VISIT (OUTPATIENT)
Dept: GASTROENTEROLOGY | Age: 81
End: 2021-06-01
Payer: COMMERCIAL

## 2021-06-01 VITALS
OXYGEN SATURATION: 98 % | WEIGHT: 126 LBS | SYSTOLIC BLOOD PRESSURE: 118 MMHG | RESPIRATION RATE: 12 BRPM | BODY MASS INDEX: 22.32 KG/M2 | HEART RATE: 103 BPM | DIASTOLIC BLOOD PRESSURE: 60 MMHG | TEMPERATURE: 97 F

## 2021-06-01 DIAGNOSIS — R16.0 LIVER MASS: ICD-10-CM

## 2021-06-01 DIAGNOSIS — R93.2 ABNORMAL FINDINGS ON DIAGNOSTIC IMAGING OF LIVER AND BILIARY TRACT: ICD-10-CM

## 2021-06-01 DIAGNOSIS — R31.0 GROSS HEMATURIA: ICD-10-CM

## 2021-06-01 DIAGNOSIS — R16.0 LIVER MASS: Primary | ICD-10-CM

## 2021-06-01 LAB
APTT: 31.6 SEC (ref 24.4–36.8)
CEA: 10.3 NG/ML (ref 0–5.5)
INR BLD: 1
PROTHROMBIN TIME: 13 SEC (ref 12.3–14.9)

## 2021-06-01 PROCEDURE — 99204 OFFICE O/P NEW MOD 45 MIN: CPT | Performed by: INTERNAL MEDICINE

## 2021-06-01 PROCEDURE — G8427 DOCREV CUR MEDS BY ELIG CLIN: HCPCS | Performed by: INTERNAL MEDICINE

## 2021-06-01 PROCEDURE — 1123F ACP DISCUSS/DSCN MKR DOCD: CPT | Performed by: INTERNAL MEDICINE

## 2021-06-01 PROCEDURE — G8420 CALC BMI NORM PARAMETERS: HCPCS | Performed by: INTERNAL MEDICINE

## 2021-06-01 PROCEDURE — 4040F PNEUMOC VAC/ADMIN/RCVD: CPT | Performed by: INTERNAL MEDICINE

## 2021-06-01 PROCEDURE — 4004F PT TOBACCO SCREEN RCVD TLK: CPT | Performed by: INTERNAL MEDICINE

## 2021-06-01 ASSESSMENT — ENCOUNTER SYMPTOMS
ABDOMINAL PAIN: 0
EYE PAIN: 0
WHEEZING: 0
NAUSEA: 0
TROUBLE SWALLOWING: 0
CONSTIPATION: 0
EYE REDNESS: 0
RECTAL PAIN: 0
VOMITING: 0
COLOR CHANGE: 0
PHOTOPHOBIA: 0
ABDOMINAL DISTENTION: 0
SHORTNESS OF BREATH: 0
VOICE CHANGE: 0
DIARRHEA: 0
CHEST TIGHTNESS: 0
BLOOD IN STOOL: 0

## 2021-06-01 NOTE — PROGRESS NOTES
Subjective:      Patient ID: Tracey Garcia is a 80 y.o. male who presents today for:  Chief Complaint   Patient presents with    New Patient     liver mass        HPI  This is a very pleasant 71-year-old who came in today for the evaluation management of abnormal liver imaging. Noted patient had CAT scan without contrast that shows multiple liver masses the largest was around 8.6 cm in the left lobe. Patient had CAT scan as part of hematuria evaluation. Otherwise no underlying liver disease reported no previous history of cirrhosis. Patient denies any change of bowel movement interim diarrhea constipation. No blood in the stool. No hematemesis melena hematochezia. Had previous colonoscopy in 2013 as well as previous EGD as part of iron deficiency anemia work-up. That showed prepyloric adenomatous polyp with chronic gastritis and intestinal metaplasia. Noted H. pylori negative. At that time patient had EGD that showed celiac disease. Otherwise patient mentioned that he lost appetite denies abdominal pain per se. Denies any nausea vomiting blood in the stool. No black stool reported. Patient came in today for the average management of liver mass. Patient reports remote history testicular cancer. Also noted history of COPD. Patient came in today for the evaluation management.   Past Medical History:   Diagnosis Date    Acute pain of right shoulder 10/20/2020    Adult celiac disease 2013    Bilateral leg edema 5/6/2021    Cellulitis of right foot 8/28/2017    COPD (chronic obstructive pulmonary disease) (HCC)     Deformity of toe of left foot     Diverticulosis of colon     Hernia, abdominal     Lung disease     Memory loss or impairment 2016    Personal history of testicular cancer     Rotator cuff syndrome of right shoulder 4/19/2021    S/P colonoscopy with polypectomy 2013    DUE 2018    Tubular adenoma of colon 2013    Dr Eric Iraheta    Vitamin D deficiency 2014     Past Surgical History:   Procedure Laterality Date    CARPAL TUNNEL RELEASE Right 2013    Dr Bernarda Hill Left     Dr Zenaida Hernandez History     Socioeconomic History    Marital status:      Spouse name: Not on file    Number of children: 9    Years of education: Not on file    Highest education level: Not on file   Occupational History    Occupation: construction, retired   Tobacco Use    Smoking status: Current Every Day Smoker     Packs/day: 1.00     Years: 40.00     Pack years: 40.00     Types: Cigarettes     Start date: 8/5/1957    Smokeless tobacco: Never Used    Tobacco comment: tried patches but got rash   Vaping Use    Vaping Use: Never used   Substance and Sexual Activity    Alcohol use: Not Currently     Alcohol/week: 0.0 standard drinks    Drug use: No    Sexual activity: Not on file   Other Topics Concern    Not on file   Social History Narrative    Born in [place], one of 8    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[address],[address], worked in construction    , 7 children, all live in [address] with girlfriend in an aprtment in [address] [place] [address] neighbor checks on him     [address] [place] [address] Strain:     Difficulty of Paying Living Expenses:    Food Insecurity:     Worried About [address] [facility] in the Last Year:    [address] [facility] in the Last Year:    Transportation Needs:     Lack of Transportation (Medical):      Lack of Transportation (Non-Medical):    Physical Activity:     Days of Exercise per Week:     Minutes of Exercise per Session:    Stress:     Feeling of Stress :    Social Connections:     Frequency of Communication with Friends and Family:     Frequency of Social Gatherings with Friends and Family:     Attends Oriental orthodox Services:     Active Member of Clubs or Organizations:     Attends Club or Organization Meetings:     Marital Status:    Intimate Partner Violence:     Fear of Current or Ex-Partner:     Emotionally Abused:     Physically Abused:     Sexually Abused:      Family History   Family history unknown: Yes     Allergies   Allergen Reactions    Aricept [Donepezil Hydrochloride] Diarrhea    Exelon [Rivastigmine Tartrate] Rash and Other (See Comments)     burn    Vancomycin Rash         Review of Systems   Constitutional: Negative for appetite change, chills, fatigue, fever and unexpected weight change. HENT: Negative for nosebleeds, tinnitus, trouble swallowing and voice change. Eyes: Negative for photophobia, pain and redness. Respiratory: Negative for chest tightness, shortness of breath and wheezing. Cardiovascular: Negative for chest pain, palpitations and leg swelling. Gastrointestinal: Negative for abdominal distention, abdominal pain, blood in stool, constipation, diarrhea, nausea, rectal pain and vomiting. Endocrine: Negative for polydipsia, polyphagia and polyuria. Genitourinary: Positive for hematuria. Negative for difficulty urinating. Skin: Negative for color change, pallor and rash. Neurological: Negative for dizziness, speech difficulty and headaches. Psychiatric/Behavioral: Negative for confusion and suicidal ideas. Objective:   /60 (Site: Right Upper Arm, Position: Sitting, Cuff Size: Small Adult)   Pulse 103   Temp 97 °F (36.1 °C) (Temporal)   Resp 12   Wt 126 lb (57.2 kg)   SpO2 98%   BMI 22.32 kg/m²     Physical Exam  Constitutional:       General: He is not in acute distress. Appearance: He is well-developed. HENT:      Head: Normocephalic and atraumatic. Eyes:      Conjunctiva/sclera: Conjunctivae normal.      Pupils: Pupils are equal, round, and reactive to light. Cardiovascular:      Rate and Rhythm: Normal rate and regular rhythm. Heart sounds: Normal heart sounds. Pulmonary:      Effort: Pulmonary effort is normal. No respiratory distress. Breath sounds: Normal breath sounds. No wheezing or rales.    Abdominal: General: Bowel sounds are normal. There is no distension. Palpations: Abdomen is soft. Abdomen is not rigid. There is no hepatomegaly, splenomegaly or mass. Tenderness: There is no abdominal tenderness. There is no guarding or rebound. Musculoskeletal:         General: No tenderness or deformity. Normal range of motion. Cervical back: Neck supple. Skin:     Coloration: Skin is not pale. Findings: No erythema or rash. Neurological:      Mental Status: He is alert and oriented to person, place, and time. Laboratory, Pathology, Radiology reviewed in detail with relevantimportant investigations summarized below:  Lab Results   Component Value Date    WBC 5.7 05/24/2021    WBC 6.3 04/19/2021    WBC 5.6 12/14/2020    WBC 7.2 08/24/2020    WBC 6.2 07/26/2019    HGB 10.4 05/24/2021    HGB 11.0 04/19/2021    HGB 10.8 12/14/2020    HGB 12.3 08/24/2020    HGB 12.2 07/26/2019    HCT 30.9 05/24/2021    HCT 32.7 04/19/2021    HCT 32.1 12/14/2020    HCT 37.6 08/24/2020    HCT 35.4 07/26/2019    MCV 93.2 05/24/2021    MCV 94.5 04/19/2021    MCV 94.0 12/14/2020    MCV 95.4 08/24/2020    MCV 92.4 07/26/2019     05/24/2021     04/19/2021     12/14/2020     08/24/2020     07/26/2019    . Lab Results   Component Value Date    ALT <5 05/24/2021    ALT 12 04/19/2021    ALT 7 12/14/2020    AST 29 05/24/2021    AST 25 04/19/2021    AST 17 12/14/2020    ALKPHOS 83 05/24/2021    ALKPHOS 56 04/19/2021    ALKPHOS 54 12/14/2020    BILITOT <0.2 05/24/2021    BILITOT <0.2 04/19/2021    BILITOT <0.2 12/14/2020       CT ABDOMEN PELVIS WO CONTRAST Additional Contrast? None    Result Date: 5/24/2021  EXAMINATION: CT ABDOMEN PELVIS WO CONTRAST HISTORY:  hematuria w h/o stones  COMPARISON: CT abdomen and pelvis from February 7, 2012 TECHNIQUE: Contiguous axial CT sections of the abdomen and pelvis. No IV contrast administered.  Unenhanced imaging is limited for the evaluation of some intra-abdominal and pelvic pathologies. Sagittal and coronal reformats have been obtained. All CT scans at this facility use dose modulation, iterative reconstruction, and/or weight based dosing when appropriate to reduce radiation dose to as low as reasonably achievable. FINDINGS  Lung bases:Visualized lung bases show no significant pathology Liver: The liver is normal in size and attenuation. This study is limited without IV contrast administration. There are numerous low-density masses in the liver, largest has ill-defined appearance and a diameter of 4.5 x 6.1 x 8.6 cm and is in the left lobe. There is a second 2 cm lesion in the left abdomen. At least 2 low density lesions are identified in the right lobe measuring 2.9 and 1.5 cm respectively. There is no intra or extrahepatic bile duct dilatation. Gallbladder: There is a 2 mm calcified stone in the angiogram portion of the gallbladder with associated gallbladder wall calcification. Normal gallbladder wall. No pericholecystic fluid. Spleen: There are no focal lesions or calcifications in the spleen. There is no splenomegaly  Pancreas: The pancreas is normal in size and attenuation without focal lesions or dilatation of the pancreatic duct. Adrenal glands are negative. Kidneys: There is mild right renal atrophy and moderate right hydronephrosis and hydroureter to the level of the ureterovesical junction. No definite radiopaque stones are identified within the kidney or along the course of the right ureter. The left kidney is normal in size and position without stones or hydronephrosis. There is a 3 mm calcification in the right renal hilum which most likely represents a vascular calcification. There is no left hydroureter. Bowel: There is no evidence of bowel obstruction. There are diverticula of the descending and sigmoid colon similar to the prior study without evidence of diverticulitis. Appendix: There  is no CT evidence for appendicitis.   Nodes: No lymphadenopathy. Aorta: There is no abdominal aortic aneurysm. Peritoneum: No free fluid or free air. Pelvis: There are no solid or cystic lesions. The urinary bladder is within normal limits. Abdominal wall: The abdominal wall is intact. Bones : There are no acute osseous changes. Soft tissues: There is shotty bilateral inguinal lymphadenopathy. There are multiple low density masses in the liver highly worrisome for metastases. The largest measures 6 cm in greatest diameter is located in the right lobe of the liver. The masses are incompletely characterized on noncontrast study. There is a 2 mm calcified stone in the gallbladder versus gallbladder wall calcification. There is atrophy of the right kidney and moderate right hydronephrosis and hydroureter which is suggestive of chronic obstruction. No definite intrarenal stones or ureteral stones are identified on either side. Lab Results   Component Value Date    IRON 90 03/26/2014    IRON 73 01/28/2014    IRON 37 11/26/2013    TIBC 290 03/26/2014    TIBC 279 01/28/2014    TIBC 274 11/26/2013    FERRITIN 238.2 03/26/2014    FERRITIN 163.2 01/28/2014    FERRITIN 214.0 11/26/2013     Lab Results   Component Value Date    INR 0.9 12/14/2020     No components found for: ACUTEHEPATITISSCREEN  No components found for: CELIACPANEL  No components found for: STOOLCULTURE, C.DIFF, STOOLOVAPARASITE, STOOLLEUCOCYTE        Assessment:       Diagnosis Orders   1. Liver mass  AFP Tumor Marker    CEA    Cancer Antigen 19-9    MRI ABDOMEN W WO CONTRAST    IR BIOPSY LIVER PERCUTANEOUS    Ambulatory referral to Interventional Radiology   2.  Abnormal findings on diagnostic imaging of liver and biliary tract   AFP Tumor Marker         Plan:      Orders Placed This Encounter   Procedures    MRI ABDOMEN W WO CONTRAST     Standing Status:   Future     Standing Expiration Date:   6/1/2022     Order Specific Question:   Reason for exam:     Answer:   liver mass     Order Specific Question:   Specify organ? Answer:   Liver    IR BIOPSY LIVER PERCUTANEOUS     Standing Status:   Future     Standing Expiration Date:   6/1/2022    AFP Tumor Marker     Standing Status:   Future     Standing Expiration Date:   6/1/2022    CEA     Standing Status:   Future     Standing Expiration Date:   6/1/2022    Cancer Antigen 19-9     Standing Status:   Future     Standing Expiration Date:   6/1/2022    Ambulatory referral to Interventional Radiology     Referral Priority:   Routine     Referral Type:   Consult for Advice and Opinion     Referral Reason:   Specialty Services Required     Requested Specialty:   Radiology     Number of Visits Requested:   1     No orders of the defined types were placed in this encounter. Assessment and plan   1-Abnormal liver imaging, weight loss  Patient had CAT scan that shows multiple liver lesions the largest around 8.6 cm in the hepatic lobe  The CT scan was without contrast noted also borderline GFR  At this time, will obtain cross-sectional imaging with contrast for further evaluation. Obtain tumor markers  Obtain IR liver mass biopsy for further characterization  Noted previous EGD that showed adenomatous prepyloric polyp in the context of H. pylori negative intestinal metaplasia. Also remote history of colonoscopy with polypectomy. Will consider endoscopy evaluation pending repeat MRI and liver mass biopsy result  Noted hematuria for which patient is currently undergoing urology work-up. 2-Underlying liver disease evaluation  No clinical, radiological or lab data suggestive of advanced liver disease  3-Associated medical conditions include history of celiac disease, chronic kidney disease, hematuria hydronephrosis for which patient currently undergoing evaluation, history of diverticulosis and colon polyps, history of gastric polyp with underlying intestinal metaplasia H. pylori negative, COPD( CT chest pending). 2 diabetes mellitus.     Return in about 3 weeks (around 6/22/2021) for further management.       Yajaira Winkler MD

## 2021-06-02 ENCOUNTER — TELEPHONE (OUTPATIENT)
Dept: FAMILY MEDICINE CLINIC | Age: 81
End: 2021-06-02

## 2021-06-02 NOTE — TELEPHONE ENCOUNTER
Patients friend called in and said patient was diagnosed with a cancer in his liver. And he was to see an oncologist by the name of Olga Herring but they need a referral. If referral is placed I will follow up and fax it over.     Thank you

## 2021-06-03 ENCOUNTER — OFFICE VISIT (OUTPATIENT)
Dept: CARDIOLOGY CLINIC | Age: 81
End: 2021-06-03
Payer: COMMERCIAL

## 2021-06-03 ENCOUNTER — HOSPITAL ENCOUNTER (OUTPATIENT)
Dept: CT IMAGING | Age: 81
Discharge: HOME | End: 2021-06-05
Payer: COMMERCIAL

## 2021-06-03 VITALS
HEIGHT: 63 IN | OXYGEN SATURATION: 98 % | HEART RATE: 94 BPM | WEIGHT: 126 LBS | SYSTOLIC BLOOD PRESSURE: 130 MMHG | DIASTOLIC BLOOD PRESSURE: 60 MMHG | BODY MASS INDEX: 22.32 KG/M2

## 2021-06-03 DIAGNOSIS — N18.32 STAGE 3B CHRONIC KIDNEY DISEASE (HCC): ICD-10-CM

## 2021-06-03 DIAGNOSIS — M79.605 BILATERAL LEG PAIN: ICD-10-CM

## 2021-06-03 DIAGNOSIS — I73.9 CLAUDICATION IN PERIPHERAL VASCULAR DISEASE (HCC): Primary | ICD-10-CM

## 2021-06-03 DIAGNOSIS — Z72.0 TOBACCO ABUSE: Chronic | ICD-10-CM

## 2021-06-03 DIAGNOSIS — R94.31 NONSPECIFIC ST-T WAVE ELECTROCARDIOGRAPHIC CHANGES: ICD-10-CM

## 2021-06-03 DIAGNOSIS — R16.0 LIVER MASSES: ICD-10-CM

## 2021-06-03 DIAGNOSIS — M79.604 BILATERAL LEG PAIN: ICD-10-CM

## 2021-06-03 DIAGNOSIS — R60.0 BILATERAL LEG EDEMA: Chronic | ICD-10-CM

## 2021-06-03 DIAGNOSIS — I10 ESSENTIAL HYPERTENSION: Chronic | ICD-10-CM

## 2021-06-03 LAB
AFP: 0.6 UG/L
CA 19-9: 42 U/ML (ref 0–35)

## 2021-06-03 PROCEDURE — 4004F PT TOBACCO SCREEN RCVD TLK: CPT | Performed by: INTERNAL MEDICINE

## 2021-06-03 PROCEDURE — 99205 OFFICE O/P NEW HI 60 MIN: CPT | Performed by: INTERNAL MEDICINE

## 2021-06-03 PROCEDURE — G8420 CALC BMI NORM PARAMETERS: HCPCS | Performed by: INTERNAL MEDICINE

## 2021-06-03 PROCEDURE — G8427 DOCREV CUR MEDS BY ELIG CLIN: HCPCS | Performed by: INTERNAL MEDICINE

## 2021-06-03 PROCEDURE — 93000 ELECTROCARDIOGRAM COMPLETE: CPT | Performed by: INTERNAL MEDICINE

## 2021-06-03 PROCEDURE — 71250 CT THORAX DX C-: CPT

## 2021-06-03 PROCEDURE — 4040F PNEUMOC VAC/ADMIN/RCVD: CPT | Performed by: INTERNAL MEDICINE

## 2021-06-03 PROCEDURE — 1123F ACP DISCUSS/DSCN MKR DOCD: CPT | Performed by: INTERNAL MEDICINE

## 2021-06-03 NOTE — PROGRESS NOTES
Chief Complaint   Patient presents with   [facility] Cardiologist     DR BOYD REFERRING    Claudication     P.A.D.    Edema       6-3-21: Patient presents for initial medical evaluation. Patient is followed on a regular basis by Dr. Melonie Ellis MD.  Referred by PCP for PAD work-up. Recently diagnosed with liver cancer. Has swelling in bilateral lower extremities worse on the right. Does have some mild discomfort around his ankle when he walks. Status post negative right lower extremity venous duplex ultrasound in December 2020 no previous history of MI, congestive heart failure or arrhythmia. . No history of cardiac catheterization or stress test.  Patient with history of diabetes, hypertension hyperlipidemia. Has stage III chronic kidney disease with a GFR of 43. Positive smoker. EKG with normal sinus rhythm, no ischemia.         Patient Active Problem List   Diagnosis    Hernia, abdominal    Personal history of testicular cancer    Memory loss or impairment    Acid reflux    Erectile dysfunction    Anemia, iron deficiency, inadequate dietary intake    Carpal tunnel syndrome    Ectropion    History of artificial lens replacement    Mixed conductive and sensorineural hearing loss, unilateral with unrestricted hearing on the contralateral side    Nonproliferative diabetic retinopathy ([address]    Enthesopathy of ankle and tarsus    Sinus tarsi syndrome    Acquired stenosis of nasolacrimal duct    Varicose veins of both lower extremities    Pterygium    Deformity of toe of left foot    Controlled type 2 diabetes mellitus with mild nonproliferative retinopathy, with long-term current use of insulin ([address]    Pseudophakia    Hearing loss    Presence of intraocular lens    COPD (chronic obstructive pulmonary disease) ([address]    Smoking history    Essential hypertension    Non-seasonal allergic rhinitis due to pollen    Weight loss    Tobacco abuse    Dysgeusia    Late onset Alzheimer's disease without behavioral disturbance ([facility]    Vitamin D deficiency    Vitamin B12 deficiency    Acute pain of right shoulder    Rotator cuff syndrome of right shoulder    Stage 3b chronic kidney disease    Bilateral leg edema       Past Surgical History:   Procedure Laterality Date    CARPAL TUNNEL RELEASE Right 2013    Dr Triplett Grain Left     Dr Lisa Marrero    cancer       Social History     Socioeconomic History    Marital status:      Spouse name: None    Number of children: 7    Years of education: None    Highest education level: None   Occupational History    Occupation: construction, retired   Tobacco Use    Smoking status: Current Every Day Smoker     Packs/day: 1.00     Years: 40.00     Pack years: 40.00     Types: Cigarettes     Start date: 8/5/1957    Smokeless tobacco: Never Used    Tobacco comment: tried patches but got rash   Vaping Use    Vaping Use: Never used   Substance and Sexual Activity    Alcohol use: Not Currently     Alcohol/week: 0.0 standard drinks    Drug use: No    Sexual activity: None   Other Topics Concern    None   Social History Narrative    Born in [place], one of 8    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[address],[address], worked in construction    , 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, all live in [address] [place] [address] Road with girlfriend in an aprtment in [address] [place] [address] neighbor checks on him     [address] [facility] Strain:     Difficulty of Paying Living Expenses:    Food Insecurity:     Worried About [facility] in the Last Year:    [address] in the Last Year:    Transportation Needs:     Lack of Transportation (Medical):      Lack of Transportation (Non-Medical):    Physical Activity:     Days of Exercise per Week:     Minutes of Exercise per Session:    Stress:     Feeling of Stress :    Social Connections:     Frequency of Communication with Friends and Family:     Frequency of Social Gatherings with Friends and Family:     Attends Rastafari Services:     Active Member of Clubs or Organizations:     Attends Club or Organization Meetings:     Marital Status:    Intimate Partner Violence:     Fear of Current or Ex-Partner:     Emotionally Abused:     Physically Abused:     Sexually Abused:        Family History   Family history unknown: Yes       Current Outpatient Medications   Medication Sig Dispense Refill    furosemide (LASIX) 20 MG tablet Take 1 tablet by mouth daily 3 tablet 0    memantine (NAMENDA) 5 MG tablet TAKE 1 TABLET BY MOUTH TWICE DAILY 180 tablet 1    metFORMIN (GLUCOPHAGE) 1000 MG tablet TAKE 1 TABLET BY MOUTH TWICE DAILY WITH MEALS 180 tablet 4    vitamin B-12 (CYANOCOBALAMIN) 1000 MCG tablet Take 1 tablet by mouth daily 90 tablet 4    Cholecalciferol (VITAMIN D3) 50 MCG (2000 UT) CAPS 1 po daily with meal 90 capsule 4    empagliflozin (JARDIANCE) 10 MG tablet Take 1 tablet by mouth daily 90 tablet 4    blood glucose test strips (ONE TOUCH ULTRA TEST) strip TEST THREE TIMES DAILY 300 strip 5    albuterol sulfate  (90 Base) MCG/ACT inhaler Inhale 2 puffs into the lungs every 6 hours as needed for Wheezing 1 Inhaler 5    vitamin C (ASCORBIC ACID) 500 MG tablet Take 1,000 mg by mouth daily       No current facility-administered medications for this visit. Aricept [donepezil hydrochloride], Exelon [rivastigmine tartrate], and Vancomycin    Review of Systems:  General ROS: negative  Psychological ROS: negative  Hematological and Lymphatic ROS: No history of blood clots or bleeding disorder.    Respiratory ROS: positive for - shortness of breath  Cardiovascular ROS: positive for - dyspnea on exertion and shortness of breath  Gastrointestinal ROS: negative  Genito-Urinary ROS: no dysuria, trouble voiding, or hematuria  Musculoskeletal ROS: negative  Neurological ROS: no TIA or stroke symptoms  Dermatological ROS: negative    VITALS:  Blood pressure 130/60, pulse 94, doses.     Non invasive cardiac testing will be ordered to further evaluate for any ischemic or structural heart disease as a cause of the patient symptoms. We will proceed with a Cardiolite stress test and echo. Cont with lasix    Check b/l LE venous and art duplex US    Patient was advised and encouraged to check blood pressure at home or at a pharmacy, maintain a logbook, and also call us back if blood pressure are above the target ranges or if it is low. Patient clearly understands and agrees to the instructions. We will need to continue to monitor muscle and liver enzymes, BUN, CR, and electrolytes.     Smoking cessation was strongly recommended

## 2021-06-08 DIAGNOSIS — R60.0 BILATERAL LEG EDEMA: Chronic | ICD-10-CM

## 2021-06-08 DIAGNOSIS — J44.9 CHRONIC OBSTRUCTIVE PULMONARY DISEASE, UNSPECIFIED COPD TYPE (HCC): ICD-10-CM

## 2021-06-09 RX ORDER — FUROSEMIDE 20 MG/1
20 TABLET ORAL DAILY
Qty: 3 TABLET | Refills: 0 | Status: SHIPPED | OUTPATIENT
Start: 2021-06-09

## 2021-06-16 ENCOUNTER — TELEPHONE (OUTPATIENT)
Dept: CARDIOLOGY CLINIC | Age: 81
End: 2021-06-16

## 2021-06-16 DIAGNOSIS — R94.31 ABNORMAL EKG: Primary | ICD-10-CM

## 2021-06-16 DIAGNOSIS — I73.9 PERIPHERAL VASCULAR DISEASE, UNSPECIFIED (HCC): ICD-10-CM

## 2021-06-16 DIAGNOSIS — R06.02 SOB (SHORTNESS OF BREATH) ON EXERTION: ICD-10-CM

## 2021-06-23 ENCOUNTER — HOSPITAL ENCOUNTER (OUTPATIENT)
Dept: MRI IMAGING | Age: 81
Discharge: HOME | End: 2021-06-25
Payer: COMMERCIAL

## 2021-06-23 DIAGNOSIS — K76.89 COMPENSATORY LOBAR HYPERPLASIA OF LIVER: ICD-10-CM

## 2021-06-23 DIAGNOSIS — Z85.47 PERSONAL HISTORY OF MALIGNANT NEOPLASM OF TESTIS: ICD-10-CM

## 2021-06-23 PROCEDURE — 74183 MRI ABD W/O CNTR FLWD CNTR: CPT

## 2021-06-23 PROCEDURE — A9577 INJ MULTIHANCE: HCPCS | Performed by: INTERNAL MEDICINE

## 2021-06-23 PROCEDURE — 6360000004 HC RX CONTRAST MEDICATION: Performed by: INTERNAL MEDICINE

## 2021-06-23 RX ORDER — 0.9 % SODIUM CHLORIDE 0.9 %
10 VIAL (ML) INJECTION ONCE
Status: CANCELLED | OUTPATIENT
Start: 2021-06-23 | End: 2021-06-23

## 2021-06-23 RX ORDER — SODIUM CHLORIDE 9 MG/ML
40 INJECTION INTRAVENOUS ONCE
Status: DISCONTINUED | OUTPATIENT
Start: 2021-06-23 | End: 2021-06-26 | Stop reason: HOSPADM

## 2021-06-23 RX ADMIN — GADOBENATE DIMEGLUMINE 20 ML: 529 INJECTION, SOLUTION INTRAVENOUS at 08:43

## 2021-07-06 DIAGNOSIS — R60.0 LOWER EXTREMITY EDEMA: ICD-10-CM

## 2021-07-06 DIAGNOSIS — I10 ESSENTIAL HYPERTENSION: Chronic | ICD-10-CM

## 2021-07-06 DIAGNOSIS — R06.09 DOE (DYSPNEA ON EXERTION): Primary | ICD-10-CM

## 2021-07-06 DIAGNOSIS — I73.9 CLAUDICATION (HCC): ICD-10-CM

## 2021-07-06 DIAGNOSIS — Z01.810 ENCOUNTER FOR PRE-OPERATIVE CARDIOVASCULAR CLEARANCE: ICD-10-CM

## 2021-07-06 DIAGNOSIS — N18.30 STAGE 3 CHRONIC KIDNEY DISEASE, UNSPECIFIED WHETHER STAGE 3A OR 3B CKD (HCC): ICD-10-CM

## 2021-07-06 DIAGNOSIS — Z72.0 TOBACCO ABUSE: Chronic | ICD-10-CM

## 2021-07-06 DIAGNOSIS — R29.898 FATIGUE OF LOWER EXTREMITY: ICD-10-CM

## 2021-10-08 ENCOUNTER — TELEPHONE (OUTPATIENT)
Dept: FAMILY MEDICINE CLINIC | Age: 81
End: 2021-10-08

## 2021-10-08 NOTE — TELEPHONE ENCOUNTER
Mala calling to inform pcp that Leyda Adjutant had passed away a month ago. Tj Khan states Holger always said how much he loved his doctor and how nice she was to him!